# Patient Record
Sex: FEMALE | Race: WHITE | NOT HISPANIC OR LATINO | Employment: OTHER | URBAN - METROPOLITAN AREA
[De-identification: names, ages, dates, MRNs, and addresses within clinical notes are randomized per-mention and may not be internally consistent; named-entity substitution may affect disease eponyms.]

---

## 2019-01-03 ENCOUNTER — APPOINTMENT (EMERGENCY)
Dept: RADIOLOGY | Facility: HOSPITAL | Age: 54
End: 2019-01-03
Payer: COMMERCIAL

## 2019-01-03 ENCOUNTER — HOSPITAL ENCOUNTER (EMERGENCY)
Facility: HOSPITAL | Age: 54
Discharge: HOME | End: 2019-01-03
Attending: EMERGENCY MEDICINE | Admitting: EMERGENCY MEDICINE
Payer: COMMERCIAL

## 2019-01-03 VITALS
SYSTOLIC BLOOD PRESSURE: 129 MMHG | DIASTOLIC BLOOD PRESSURE: 78 MMHG | RESPIRATION RATE: 18 BRPM | HEART RATE: 98 BPM | OXYGEN SATURATION: 98 % | TEMPERATURE: 97.9 F

## 2019-01-03 DIAGNOSIS — S82.202A CLOSED FRACTURE OF LEFT TIBIA AND FIBULA, INITIAL ENCOUNTER: Primary | ICD-10-CM

## 2019-01-03 DIAGNOSIS — S82.402A CLOSED FRACTURE OF LEFT TIBIA AND FIBULA, INITIAL ENCOUNTER: Primary | ICD-10-CM

## 2019-01-03 PROCEDURE — 73630 X-RAY EXAM OF FOOT: CPT

## 2019-01-03 PROCEDURE — 99283 EMERGENCY DEPT VISIT LOW MDM: CPT

## 2019-01-03 PROCEDURE — 73610 X-RAY EXAM OF ANKLE: CPT

## 2019-01-03 RX ORDER — TRAMADOL HYDROCHLORIDE 50 MG/1
50 TABLET ORAL ONCE
Status: COMPLETED | OUTPATIENT
Start: 2019-01-03 | End: 2019-01-03

## 2019-01-03 RX ORDER — ACETAMINOPHEN 325 MG/1
650 TABLET ORAL ONCE
Status: COMPLETED | OUTPATIENT
Start: 2019-01-03 | End: 2019-01-03

## 2019-01-03 RX ORDER — NAPROXEN 500 MG/1
500 TABLET ORAL 2 TIMES DAILY WITH MEALS
Qty: 30 TABLET | Refills: 0 | Status: SHIPPED | OUTPATIENT
Start: 2019-01-03 | End: 2021-07-14 | Stop reason: ALTCHOICE

## 2019-01-03 RX ORDER — ACETAMINOPHEN AND CODEINE PHOSPHATE 300; 30 MG/1; MG/1
1 TABLET ORAL EVERY 4 HOURS PRN
Qty: 20 TABLET | Refills: 0 | Status: SHIPPED | OUTPATIENT
Start: 2019-01-03 | End: 2021-09-02 | Stop reason: ALTCHOICE

## 2019-01-03 RX ADMIN — ACETAMINOPHEN 650 MG: 325 TABLET, FILM COATED ORAL at 19:51

## 2019-01-03 RX ADMIN — TRAMADOL HYDROCHLORIDE 50 MG: 50 TABLET, FILM COATED ORAL at 21:46

## 2019-01-04 NOTE — DISCHARGE INSTRUCTIONS
Leg Fracture   WHAT YOU NEED TO KNOW:   A leg fracture is a break in any of the 3 long bones of your leg  The femur is the largest bone and goes from your hip to your knee  The fibula and tibia are the 2 bones in your lower leg that go from your knee to your ankle  DISCHARGE INSTRUCTIONS:   Return to the emergency department if:   · Your leg feels warm, tender, and painful  It may look swollen and red  · The pain in your injured leg gets worse even after you rest and take medicine  · Your cast gets wet or damaged  · Your leg or toes are numb  · The skin or toes of your injured leg become swollen, cold, or blue  Contact your healthcare provider or bone specialist if:   · You have a fever  · Your cast or brace is too tight  · There are new blood stains or a bad smell coming from under the cast     · You have new or worsening trouble moving your leg  · You have questions or concerns about your condition or care  Medicines:   · Prescription pain medicine  may be given  Ask how to take this medicine safely  · NSAIDs , such as ibuprofen, help decrease swelling, pain, and fever  NSAIDs can cause stomach bleeding or kidney problems in certain people  If you take blood thinner medicine, always ask your healthcare provider if NSAIDs are safe for you  Always read the medicine label and follow directions  · Acetaminophen  decreases pain and fever  It is available without a doctor's order  Ask how much to take and how often to take it  Follow directions  Read the labels of all other medicines you are using to see if they also contain acetaminophen, or ask your doctor or pharmacist  Acetaminophen can cause liver damage if not taken correctly  Do not use more than 4 grams (4,000 milligrams) total of acetaminophen in one day  · Take your medicine as directed  Contact your healthcare provider if you think your medicine is not helping or if you have side effects   Tell him of her if you are allergic to any medicine  Keep a list of the medicines, vitamins, and herbs you take  Include the amounts, and when and why you take them  Bring the list or the pill bottles to follow-up visits  Carry your medicine list with you in case of an emergency  Cast or splint care:   · Check the skin around your cast and splint daily for any redness or open areas  · Do not use a sharp or pointed object to scratch your skin under the cast or splint  · Do not remove your splint unless your healthcare provider says it is okay  Bathing with a cast or splint:  Do not let your cast or splint get wet  Before bathing, cover the cast or splint with a plastic bag  Tape the bag to your skin above the cast or splint to seal out the water  Keep your leg out of the water in case the bag leaks  Ask when it is okay to take a bath or shower  Self-care:   · Rest  your leg as directed and avoid activities that cause leg pain  · Apply ice  on your leg for 15 to 20 minutes every hour or as directed  Use an ice pack, or put crushed ice in a plastic bag  Cover it with a towel  Ice helps prevent tissue damage and decreases swelling and pain  · Elevate  your leg above the level of your heart as often as you can  This will help decrease swelling and pain  Prop your leg on pillows or blankets to keep it elevated comfortably  · Use crutches or a walker  as directed  Crutches will help you walk and take some weight off your injured leg while it heals  · Physical therapy  may be recommended  A physical therapist teaches you exercises to help improve movement and strength, and to decrease pain  Follow up with your healthcare provider or bone specialist as directed: You may need to return to have your splint or cast removed  You may need an x-ray of your leg to check how well the bone has healed  Write down your questions so you remember to ask them during your visits    © 2017 [employer] Information is for End User's use only and may not be sold, redistributed or otherwise used for commercial purposes  All illustrations and images included in CareNotes® are the copyrighted property of A D A M , Inc  or Eulalio Adkins  The above information is an  only  It is not intended as medical advice for individual conditions or treatments  Talk to your doctor, nurse or pharmacist before following any medical regimen to see if it is safe and effective for you
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
## 2019-01-10 ENCOUNTER — APPOINTMENT (OUTPATIENT)
Dept: RADIOLOGY | Facility: CLINIC | Age: 54
End: 2019-01-10
Payer: COMMERCIAL

## 2019-01-10 ENCOUNTER — OFFICE VISIT (OUTPATIENT)
Dept: OBGYN CLINIC | Facility: MEDICAL CENTER | Age: 54
End: 2019-01-10
Payer: COMMERCIAL

## 2019-01-10 ENCOUNTER — HOSPITAL ENCOUNTER (OUTPATIENT)
Dept: RADIOLOGY | Facility: CLINIC | Age: 54
Discharge: HOME | End: 2019-01-10
Payer: COMMERCIAL

## 2019-01-10 VITALS — HEIGHT: 63 IN | WEIGHT: 143 LBS | BODY MASS INDEX: 25.34 KG/M2

## 2019-01-10 DIAGNOSIS — S82.842A CLOSED BIMALLEOLAR FRACTURE OF LEFT ANKLE, INITIAL ENCOUNTER: Primary | ICD-10-CM

## 2019-01-10 DIAGNOSIS — S82.842A CLOSED BIMALLEOLAR FRACTURE OF LEFT ANKLE, INITIAL ENCOUNTER: ICD-10-CM

## 2019-01-10 DIAGNOSIS — S93.601A SPRAIN OF RIGHT FOOT, INITIAL ENCOUNTER: ICD-10-CM

## 2019-01-10 PROCEDURE — 73630 X-RAY EXAM OF FOOT: CPT

## 2019-01-10 PROCEDURE — 73610 X-RAY EXAM OF ANKLE: CPT

## 2019-01-10 PROCEDURE — 99204 OFFICE O/P NEW MOD 45 MIN: CPT | Performed by: EMERGENCY MEDICINE

## 2019-01-10 RX ORDER — QUETIAPINE FUMARATE 300 MG/1
TABLET, FILM COATED ORAL
COMMUNITY
Start: 2018-11-20

## 2019-01-10 RX ORDER — LORAZEPAM 0.5 MG/1
TABLET ORAL
Refills: 0 | COMMUNITY
Start: 2019-01-04

## 2019-01-10 RX ORDER — LISINOPRIL 2.5 MG/1
TABLET ORAL
Refills: 0 | COMMUNITY
Start: 2018-10-11 | End: 2021-07-14 | Stop reason: ALTCHOICE

## 2019-01-10 RX ORDER — LEVOTHYROXINE SODIUM 175 UG/1
TABLET ORAL
Refills: 0 | COMMUNITY
Start: 2019-01-04 | End: 2021-07-14 | Stop reason: SDUPTHER
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
## 2019-01-13 ENCOUNTER — HOSPITAL ENCOUNTER (OUTPATIENT)
Dept: CT IMAGING | Facility: HOSPITAL | Age: 54
Discharge: HOME | End: 2019-01-13
Attending: EMERGENCY MEDICINE
Payer: COMMERCIAL

## 2019-01-13 DIAGNOSIS — S93.601A SPRAIN OF RIGHT FOOT, INITIAL ENCOUNTER: ICD-10-CM

## 2019-01-13 PROCEDURE — 73700 CT LOWER EXTREMITY W/O DYE: CPT

## 2019-01-15 DIAGNOSIS — S92.324A CLOSED NONDISPLACED FRACTURE OF SECOND METATARSAL BONE OF RIGHT FOOT, INITIAL ENCOUNTER: Primary | ICD-10-CM

## 2019-01-15 DIAGNOSIS — S93.601A SPRAIN OF RIGHT FOOT, INITIAL ENCOUNTER: ICD-10-CM

## 2019-01-16 ENCOUNTER — TRANSCRIBE ORDERS (OUTPATIENT)
Dept: ADMINISTRATIVE | Facility: HOSPITAL | Age: 54
End: 2019-01-16

## 2019-01-16 ENCOUNTER — HOSPITAL ENCOUNTER (OUTPATIENT)
Dept: MRI IMAGING | Facility: HOSPITAL | Age: 54
Discharge: HOME | End: 2019-01-16
Payer: COMMERCIAL

## 2019-01-16 DIAGNOSIS — S93.601A SPRAIN OF RIGHT FOOT, INITIAL ENCOUNTER: ICD-10-CM

## 2019-01-16 DIAGNOSIS — S92.324A CLOSED NONDISPLACED FRACTURE OF SECOND METATARSAL BONE OF RIGHT FOOT, INITIAL ENCOUNTER: ICD-10-CM

## 2019-01-16 PROCEDURE — 73718 MRI LOWER EXTREMITY W/O DYE: CPT

## 2019-01-17 ENCOUNTER — APPOINTMENT (OUTPATIENT)
Dept: RADIOLOGY | Facility: CLINIC | Age: 54
End: 2019-01-17
Payer: COMMERCIAL

## 2019-01-17 ENCOUNTER — OFFICE VISIT (OUTPATIENT)
Dept: OBGYN CLINIC | Facility: CLINIC | Age: 54
End: 2019-01-17
Payer: COMMERCIAL

## 2019-01-17 VITALS
BODY MASS INDEX: 25.33 KG/M2 | HEIGHT: 63 IN | HEART RATE: 91 BPM | DIASTOLIC BLOOD PRESSURE: 75 MMHG | SYSTOLIC BLOOD PRESSURE: 124 MMHG

## 2019-01-17 DIAGNOSIS — S82.842A CLOSED BIMALLEOLAR FRACTURE OF LEFT ANKLE, INITIAL ENCOUNTER: ICD-10-CM

## 2019-01-17 DIAGNOSIS — S92.909A MULTIPLE CLOSED FRACTURES OF FOOT, INITIAL ENCOUNTER: ICD-10-CM

## 2019-01-17 DIAGNOSIS — S82.842A CLOSED BIMALLEOLAR FRACTURE OF LEFT ANKLE, INITIAL ENCOUNTER: Primary | ICD-10-CM

## 2019-01-17 PROCEDURE — 99213 OFFICE O/P EST LOW 20 MIN: CPT | Performed by: ORTHOPAEDIC SURGERY

## 2019-01-17 PROCEDURE — 73610 X-RAY EXAM OF ANKLE: CPT
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
## 2019-01-25 ENCOUNTER — OFFICE VISIT (OUTPATIENT)
Dept: OBGYN CLINIC | Facility: MEDICAL CENTER | Age: 54
End: 2019-01-25
Payer: COMMERCIAL

## 2019-01-25 VITALS
WEIGHT: 143 LBS | HEIGHT: 63 IN | HEART RATE: 97 BPM | BODY MASS INDEX: 25.34 KG/M2 | DIASTOLIC BLOOD PRESSURE: 56 MMHG | SYSTOLIC BLOOD PRESSURE: 85 MMHG

## 2019-01-25 DIAGNOSIS — S92.902D CLOSED MULTIPLE FRACTURES OF LEFT FOOT WITH ROUTINE HEALING, SUBSEQUENT ENCOUNTER: ICD-10-CM

## 2019-01-25 DIAGNOSIS — S82.842D CLOSED BIMALLEOLAR FRACTURE OF LEFT ANKLE WITH ROUTINE HEALING, SUBSEQUENT ENCOUNTER: Primary | ICD-10-CM

## 2019-01-25 PROCEDURE — 99213 OFFICE O/P EST LOW 20 MIN: CPT | Performed by: EMERGENCY MEDICINE

## 2019-01-25 RX ORDER — PNEUMOCOCCAL VACCINE POLYVALENT 25; 25; 25; 25; 25; 25; 25; 25; 25; 25; 25; 25; 25; 25; 25; 25; 25; 25; 25; 25; 25; 25; 25 UG/.5ML; UG/.5ML; UG/.5ML; UG/.5ML; UG/.5ML; UG/.5ML; UG/.5ML; UG/.5ML; UG/.5ML; UG/.5ML; UG/.5ML; UG/.5ML; UG/.5ML; UG/.5ML; UG/.5ML; UG/.5ML; UG/.5ML; UG/.5ML; UG/.5ML; UG/.5ML; UG/.5ML; UG/.5ML; UG/.5ML
INJECTION, SOLUTION INTRAMUSCULAR; SUBCUTANEOUS
Refills: 0 | COMMUNITY
Start: 2018-11-23 | End: 2021-07-14 | Stop reason: ALTCHOICE

## 2019-01-25 RX ORDER — GABAPENTIN 300 MG/1
CAPSULE ORAL
COMMUNITY
Start: 2018-11-20 | End: 2021-07-19 | Stop reason: ALTCHOICE

## 2019-01-25 RX ORDER — GABAPENTIN 600 MG/1
TABLET ORAL 3 TIMES DAILY
COMMUNITY
End: 2021-07-19 | Stop reason: ALTCHOICE

## 2019-01-25 RX ORDER — INFLUENZA A VIRUS A/SINGAPORE/GP1908/2015 IVR-180A (H1N1) ANTIGEN (PROPIOLACTONE INACTIVATED), INFLUENZA A VIRUS A/HONG KONG/4801/2014 X-263B (H3N2) ANTIGEN (PROPIOLACTONE INACTIVATED), AND INFLUENZA B VIRUS B/BRISBANE/46/2015 ANTIGEN (PROPIOLACTONE INACTIVATED) 15; 15; 15 UG/.5ML; UG/.5ML; UG/.5ML
INJECTION, SUSPENSION INTRAMUSCULAR
Refills: 0 | COMMUNITY
Start: 2018-11-23 | End: 2021-09-02 | Stop reason: ALTCHOICE

## 2019-01-25 RX ORDER — ASCORBIC ACID 125 MG
TABLET,CHEWABLE ORAL EVERY 24 HOURS
COMMUNITY
Start: 2016-11-03

## 2019-01-25 RX ORDER — FLUOXETINE HYDROCHLORIDE 40 MG/1
CAPSULE ORAL
COMMUNITY
Start: 2018-11-20
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
## 2019-02-14 ENCOUNTER — TELEPHONE (OUTPATIENT)
Dept: OBGYN CLINIC | Facility: HOSPITAL | Age: 54
End: 2019-02-14

## 2019-02-15 ENCOUNTER — APPOINTMENT (OUTPATIENT)
Dept: RADIOLOGY | Facility: CLINIC | Age: 54
End: 2019-02-15
Payer: COMMERCIAL

## 2019-02-15 ENCOUNTER — OFFICE VISIT (OUTPATIENT)
Dept: OBGYN CLINIC | Facility: MEDICAL CENTER | Age: 54
End: 2019-02-15
Payer: COMMERCIAL

## 2019-02-15 VITALS
DIASTOLIC BLOOD PRESSURE: 70 MMHG | SYSTOLIC BLOOD PRESSURE: 114 MMHG | WEIGHT: 143 LBS | HEIGHT: 63 IN | HEART RATE: 70 BPM | BODY MASS INDEX: 25.34 KG/M2

## 2019-02-15 DIAGNOSIS — S92.324A CLOSED NONDISPLACED FRACTURE OF SECOND METATARSAL BONE OF RIGHT FOOT, INITIAL ENCOUNTER: ICD-10-CM

## 2019-02-15 DIAGNOSIS — S82.842D CLOSED BIMALLEOLAR FRACTURE OF LEFT ANKLE WITH ROUTINE HEALING, SUBSEQUENT ENCOUNTER: Primary | ICD-10-CM

## 2019-02-15 DIAGNOSIS — S82.842D CLOSED BIMALLEOLAR FRACTURE OF LEFT ANKLE WITH ROUTINE HEALING, SUBSEQUENT ENCOUNTER: ICD-10-CM

## 2019-02-15 PROCEDURE — 73630 X-RAY EXAM OF FOOT: CPT

## 2019-02-15 PROCEDURE — 99213 OFFICE O/P EST LOW 20 MIN: CPT | Performed by: EMERGENCY MEDICINE

## 2019-02-15 PROCEDURE — 73610 X-RAY EXAM OF ANKLE: CPT
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
## 2019-02-15 NOTE — PATIENT INSTRUCTIONS
Follow up in 4 weeks  After 2 weeks (8 weeks out from injury) you may wean from left walking boot      You may perform range of motion exercises at home

## 2019-02-18 ENCOUNTER — HOSPITAL ENCOUNTER (EMERGENCY)
Facility: HOSPITAL | Age: 54
Discharge: HOME | End: 2019-02-18
Attending: EMERGENCY MEDICINE
Payer: COMMERCIAL

## 2019-02-18 VITALS
WEIGHT: 152.8 LBS | DIASTOLIC BLOOD PRESSURE: 63 MMHG | HEART RATE: 98 BPM | RESPIRATION RATE: 16 BRPM | SYSTOLIC BLOOD PRESSURE: 109 MMHG | OXYGEN SATURATION: 100 % | TEMPERATURE: 96.5 F

## 2019-02-18 DIAGNOSIS — T16.2XXA EAR FOREIGN BODY, LEFT, INITIAL ENCOUNTER: Primary | ICD-10-CM

## 2019-02-18 PROCEDURE — 99282 EMERGENCY DEPT VISIT SF MDM: CPT

## 2019-02-18 RX ORDER — NEOMYCIN SULFATE, POLYMYXIN B SULFATE, HYDROCORTISONE 3.5; 10000; 1 MG/ML; [USP'U]/ML; MG/ML
4 SOLUTION/ DROPS AURICULAR (OTIC) EVERY 6 HOURS SCHEDULED
Qty: 5.6 ML | Refills: 0 | Status: SHIPPED | OUTPATIENT
Start: 2019-02-18 | End: 2021-07-14 | Stop reason: ALTCHOICE

## 2019-02-18 NOTE — ED PROVIDER NOTES
History  Chief Complaint   Patient presents with   Bishop Camacho     c/o of pain to left ear - x a couple of days - hurts after using  ear plugs       Earache   Location:  Left  Behind ear:  No abnormality  Quality:  Aching  Severity:  Moderate  Onset quality:  Gradual  Timing:  Constant  Progression:  Worsening  Chronicity:  New  Context: foreign body    Relieved by:  Nothing  Worsened by:  Nothing  Ineffective treatments:  None tried  Associated symptoms: no abdominal pain, no cough, no diarrhea, no fever, no headaches, no rash, no rhinorrhea and no sore throat        None       Past Medical History:   Diagnosis Date    Ankle fracture     Depression     Disease of thyroid gland     Foot fracture, left     Psychiatric disorder     Renal disorder        Past Surgical History:   Procedure Laterality Date    ALIS-EN-Y PROCEDURE         No family history on file  I have reviewed and agree with the history as documented  Social History     Tobacco Use    Smoking status: Never Smoker    Smokeless tobacco: Never Used   Substance Use Topics    Alcohol use: Never     Frequency: Never    Drug use: Never        Review of Systems   Constitutional: Negative for chills and fever  HENT: Positive for ear pain  Negative for rhinorrhea, sore throat and trouble swallowing  Eyes: Negative for pain  Respiratory: Negative for cough, shortness of breath, wheezing and stridor  Cardiovascular: Negative for chest pain and leg swelling  Gastrointestinal: Negative for abdominal pain, diarrhea and nausea  Endocrine: Negative for polyuria  Genitourinary: Negative for dysuria, flank pain and urgency  Musculoskeletal: Negative for joint swelling, myalgias and neck stiffness  Skin: Negative for rash  Allergic/Immunologic: Negative for immunocompromised state  Neurological: Negative for dizziness, syncope, weakness, numbness and headaches  Psychiatric/Behavioral: Negative for confusion and suicidal ideas     All other systems reviewed and are negative  Physical Exam  Physical Exam   Constitutional: She is oriented to person, place, and time  She appears well-developed and well-nourished  HENT:   Head: Normocephalic and atraumatic  Right Ear: Tympanic membrane, external ear and ear canal normal    Left Ear: A foreign body is present  Eyes: Pupils are equal, round, and reactive to light  EOM are normal    Neck: Normal range of motion  Neck supple  Cardiovascular: Normal rate and regular rhythm  Exam reveals no friction rub  No murmur heard  Pulmonary/Chest: Breath sounds normal  No respiratory distress  She has no wheezes  She has no rales  Abdominal: Soft  Bowel sounds are normal  She exhibits no distension  There is no tenderness  Musculoskeletal: Normal range of motion  She exhibits no edema or tenderness  Neurological: She is alert and oriented to person, place, and time  Skin: Skin is warm  No rash noted  Psychiatric: She has a normal mood and affect  Nursing note and vitals reviewed        Vital Signs  ED Triage Vitals   Temperature Pulse Respirations Blood Pressure SpO2   02/18/19 1551 02/18/19 1551 02/18/19 1551 02/18/19 1555 02/18/19 1551   (!) 96 5 °F (35 8 °C) 98 16 109/63 100 %      Temp Source Heart Rate Source Patient Position - Orthostatic VS BP Location FiO2 (%)   02/18/19 1551 02/18/19 1551 02/18/19 1555 02/18/19 1555 --   Tympanic Monitor Sitting Right arm       Pain Score       --                  Vitals:    02/18/19 1551 02/18/19 1555   BP:  109/63   Pulse: 98    Patient Position - Orthostatic VS:  Sitting       Visual Acuity      ED Medications  Medications - No data to display    Diagnostic Studies  Results Reviewed     None                 No orders to display              Procedures  Foreign Body - Orifice  Date/Time: 2/18/2019 4:48 PM  Performed by: Mita Jasso DO  Authorized by: Mita Jasso DO     Patient location:  ED  Consent:     Consent obtained:  Verbal Consent given by:  Patient    Risks discussed:  Bleeding, damage to surrounding structures, incomplete removal, infection, need for surgical removal, pain, TM perforation and worsening of condition    Alternatives discussed:  No treatment  Universal protocol:     Procedure explained and questions answered to patient or proxy's satisfaction: yes      Relevant documents present and verified: yes      Test results available and properly labeled: yes      Radiology Images displayed and confirmed  If images not available, report reviewed : yes      Required blood products, implants, devices, and special equipment available: yes      Site/side marked: yes      Immediately prior to procedure a time out was called: yes      Patient identity confirmed:  Verbally with patient and arm band  Location:     Location:  Ear    Ear location:  L ear  Pre-procedure details:     Imaging:  None  Anesthesia (see MAR for exact dosages): Topical anesthetic:  None  Procedure details:     Localization method:  Direct visualization    Removal mechanism: Forceps    Procedure complexity:  Simple    Foreign bodies recovered:  1    Description:  Ear bud from headphones    Intact foreign body removal: yes    Post-procedure details:     Confirmation:  No additional foreign bodies on visualization    Patient tolerance of procedure: Tolerated well, no immediate complications           Phone Contacts  ED Phone Contact    ED Course                               MDM    Disposition  Final diagnoses:   Ear foreign body, left, initial encounter     Time reflects when diagnosis was documented in both MDM as applicable and the Disposition within this note     Time User Action Codes Description Comment    2/18/2019  4:44 PM Gianfranco Hearing Add [T16  2XXA] Ear foreign body, left, initial encounter       ED Disposition     ED Disposition Condition Date/Time Comment    Discharge Stable Mon Feb 18, 2019  4:44 PM Desi Willoughby discharge to home/self care             Follow-up Information     Follow up With Specialties Details Why Contact Info Additional [address]   [address], [address] [facility] [id number]  [address], [address], [id number]          Patient's Medications   Discharge Prescriptions    NEOMYCIN-POLYMYXIN-HYDROCORTISONE (CORTISPORIN) 1 % SOLN    Administer 4 drops into the left ear every 6 (six) hours for 7 days       Start Date: 2/18/2019 End Date: 2/25/2019       Order Dose: 4 drops       Quantity: 5 6 mL    Refills: 0     No discharge procedures on file      ED Provider  Electronically Signed by           Elias Sorto DO  02/18/19 8559

## 2021-07-14 ENCOUNTER — OFFICE VISIT (OUTPATIENT)
Dept: FAMILY MEDICINE CLINIC | Facility: CLINIC | Age: 56
End: 2021-07-14

## 2021-07-14 VITALS
BODY MASS INDEX: 29.07 KG/M2 | DIASTOLIC BLOOD PRESSURE: 70 MMHG | HEIGHT: 61 IN | TEMPERATURE: 97.5 F | HEART RATE: 83 BPM | SYSTOLIC BLOOD PRESSURE: 108 MMHG | RESPIRATION RATE: 18 BRPM | OXYGEN SATURATION: 98 % | WEIGHT: 154 LBS

## 2021-07-14 DIAGNOSIS — E66.3 OVERWEIGHT: ICD-10-CM

## 2021-07-14 DIAGNOSIS — Z98.84 HISTORY OF ROUX-EN-Y GASTRIC BYPASS: ICD-10-CM

## 2021-07-14 DIAGNOSIS — F31.9 BIPOLAR AFFECTIVE DISORDER, REMISSION STATUS UNSPECIFIED (HCC): ICD-10-CM

## 2021-07-14 DIAGNOSIS — F32.A DEPRESSION, UNSPECIFIED DEPRESSION TYPE: ICD-10-CM

## 2021-07-14 DIAGNOSIS — J30.9 ALLERGIC RHINITIS, UNSPECIFIED SEASONALITY, UNSPECIFIED TRIGGER: Primary | ICD-10-CM

## 2021-07-14 DIAGNOSIS — Z13.31 DEPRESSION SCREEN: ICD-10-CM

## 2021-07-14 DIAGNOSIS — Z12.11 COLON CANCER SCREENING: ICD-10-CM

## 2021-07-14 DIAGNOSIS — Z91.89 NEED FOR DENTAL CARE: ICD-10-CM

## 2021-07-14 DIAGNOSIS — Z00.00 HEALTHCARE MAINTENANCE: ICD-10-CM

## 2021-07-14 DIAGNOSIS — E03.9 HYPOTHYROIDISM, UNSPECIFIED TYPE: ICD-10-CM

## 2021-07-14 DIAGNOSIS — Z12.31 BREAST CANCER SCREENING BY MAMMOGRAM: ICD-10-CM

## 2021-07-14 PROCEDURE — 99204 OFFICE O/P NEW MOD 45 MIN: CPT | Performed by: PHYSICIAN ASSISTANT

## 2021-07-14 PROCEDURE — 1036F TOBACCO NON-USER: CPT | Performed by: PHYSICIAN ASSISTANT

## 2021-07-14 RX ORDER — CETIRIZINE HYDROCHLORIDE 10 MG/1
10 TABLET ORAL DAILY
Qty: 90 TABLET | Refills: 1 | Status: SHIPPED | OUTPATIENT
Start: 2021-07-14 | End: 2021-11-08 | Stop reason: SDUPTHER

## 2021-07-14 RX ORDER — LEVOTHYROXINE SODIUM 175 UG/1
175 TABLET ORAL DAILY
Qty: 90 TABLET | Refills: 1 | Status: SHIPPED | OUTPATIENT
Start: 2021-07-14 | End: 2021-11-08 | Stop reason: SDUPTHER

## 2021-07-14 RX ORDER — FERROUS SULFATE TAB EC 324 MG (65 MG FE EQUIVALENT) 324 (65 FE) MG
324 TABLET DELAYED RESPONSE ORAL
Qty: 90 TABLET | Refills: 2 | Status: SHIPPED | OUTPATIENT
Start: 2021-07-14 | End: 2021-09-02 | Stop reason: SDUPTHER

## 2021-07-14 NOTE — PROGRESS NOTES
Assessment/Plan:     Hypothyroidism  - Will restart levothyroxine 175 mcg once daily  Will check updated TSH level  Advised patient will need another TSH level in about 6 weeks after she has restarted her medication  History of Arnav-en-Y gastric bypass  - Performed in 2015 by Dr Meredith Seymour @ [facility]   - Will check updated vitamin levels, CBC, CMP, iron levels  Bipolar disorder ([facility] )  - Continue follow-up with Psychiatry, [facility] for therapy and medication management  Continue Prozac and Ativan as prescribed through Psychiatry  Allergic rhinitis  - Will prescribe Zyrtec 10 mg daily  Return in about 6 weeks (around 8/25/2021) for Next scheduled follow up vitamins, thyroid  Diagnoses and all orders for this visit:    Allergic rhinitis, unspecified seasonality, unspecified trigger  -     cetirizine (ZyrTEC) 10 mg tablet; Take 1 tablet (10 mg total) by mouth daily    Hypothyroidism, unspecified type  -     levothyroxine 175 mcg tablet; Take 1 tablet (175 mcg total) by mouth daily  -     TSH, 3rd generation with Free T4 reflex; Future    Breast cancer screening by mammogram  -     Mammo screening bilateral w 3d & cad; Future    Healthcare maintenance  -     CBC and differential; Future  -     Comprehensive metabolic panel; Future  -     Lipid panel; Future  -     Hemoglobin A1C; Future    History of Arnav-en-Y gastric bypass  -     Vitamin D 25 hydroxy; Future  -     Vitamin B12; Future  -     Ferritin; Future  -     Iron Saturation %; Future  -     Vitamin B1, whole blood; Future  -     Folate; Future  -     ferrous sulfate 324 (65 Fe) mg; Take 1 tablet (324 mg total) by mouth 3 (three) times a day before meals    Colon cancer screening  -     Cologuard; Future    Overweight    Need for dental care  -     Ambulatory referral to Dentistry;  Future    Bipolar affective disorder, remission status unspecified ([facility] )    Depression, unspecified depression type    Depression screen        All of patients questions were answered  Patient understands and agrees with the above plan  Florecita Ryan PA-C  07/14/21  [facility]       Subjective:     Evy Escobar is a 55 y o  female who  has a past medical history of Ankle fracture, Bipolar 1 disorder ([address] ), Depression, Depression, Disease of thyroid gland, Foot fracture, left, Pneumonia, Psychiatric disorder, Renal disorder, and Tachycardia  She also has no past medical history of ADHD (attention deficit hyperactivity disorder), Asthma, Bleeding disorder ([address] ), Cancer ([address] ), Chronic kidney disease, Diabetes insipidus ([facility] ), Disease of thyroid gland, Head injury, Heart disease, Hypertension, Liver disease, Neurological disorder, Osteoarthritis, Rheumatic disease, Rheumatoid arthritis ([facility] ), Seizures ([facility] ), Skin disorder, Stomach disorder, Stroke [facility]), or Vascular disorder  who presented to the office today to establish care  - Patient is a 55 y o  female who presents today to establish care  Patient notes she has hypothyroidism and was previously taking levothyroxine 175 mcg daily  Patient notes that has been about 5-6 months without her medications  Patient notes over the past few months she has been feeling very fatigued  Patient has history of gastric bypass Arnav-en-Y surgery in 2015 by Dr Jose Mcbride at [facility]  Patient notes she lost over 100 lb and her usual weight is about 125 lb  However, patient notes over the past few months she gained about 25 lb  Patient notes she has been taking daily multivitamin, B complex, iron supplement 5 times a day  Patient notes she also tries to eat a lot of green leafy things to increase her iron, however it always seems to be low  Patient notes she donates plasma 2 times a week  Patient notes she was previously a diabetic prior to losing weight    Patient notes for the first 5 years after her surgery, she would have regular blood work completed in everything was always normal     - Patient follows with Psychiatry, [facility] for therapy and medication management  Patient notes currently she is taking Prozac and Ativan  Patient notes she believes she has some circulation issues  Patient notes both arms often fall asleep at night where she feels pins and needle sensation  Patient notes sometimes this happens with her legs as well  Patient notes her hands are always " ice cold"  Patient notes recently she has been experiencing a runny nose  Dental Regular Visits:   Note, patient would like to establish care with a dentist     Vision Problems: Wears reading glasses       Life Style  Tobacco Use: No   Alcohol Use: Not currently; last drank alcohol about 1-2 years ago; previous history of alcohol abuse   Drug Use: No    Breast Cancer Screening:  - Risks and benefits discussed  Patient does not remember when her last mammogram was  Colorectal Cancer Screening:   - Risks and benefits discussed  Patient denies ever having a colonoscopy completed  Cervical Cancer Screening:  - Risks and benefits discussed  Patient does not remember when her last Pap was      PHQ-9 Depression Screening    PHQ-9:   Frequency of the following problems over the past two weeks:      Little interest or pleasure in doing things: 0 - not at all  Feeling down, depressed, or hopeless: 0 - not at all  PHQ-2 Score: 0           Current Outpatient Medications on File Prior to Visit   Medication Sig Dispense Refill    acetaminophen-codeine (TYLENOL #3) 300-30 mg per tablet Take 1 tablet by mouth every 4 (four) hours as needed for moderate pain 20 tablet 0    AFLURIA PRESERVATIVE FREE 0 5 ML PETTY inject 0 5 milliliter intramuscularly  0    Calcium Citrate-Vitamin D (CALCET CREAMY BITES) 500-400 MG-UNIT CHEW 2 to 3 daily      Cyanocobalamin (B-12) 5000 MCG CAPS every 24 hours      Ergocalciferol (VITAMIN D2 PO)       FLUoxetine (PROzac) 40 MG capsule 2 po am      LORazepam (ATIVAN) 0 5 mg tablet   0    Multiple Vitamins-Minerals (MULTIVITAMIN ADULT PO) 2 po daily      QUEtiapine (SEROquel) 300 mg tablet Two at bedtime      [DISCONTINUED] gabapentin (NEURONTIN) 300 mg capsule One at bed time      [DISCONTINUED] gabapentin (NEURONTIN) 600 MG tablet 3 (three) times a day       No current facility-administered medications on file prior to visit  Past Medical History:   Diagnosis Date    Ankle fracture     Bipolar 1 disorder ([facility] )     Depression     Depression 7/19/2021    Disease of thyroid gland     Foot fracture, left     Pneumonia 12/2008    Psychiatric disorder     Renal disorder     Tachycardia 12/2008     Past Surgical History:   Procedure Laterality Date    GASTRIC BYPASS  01/25/2016    ALIS-EN-Y PROCEDURE       Social History     Socioeconomic History    Marital status: Single     Spouse name: None    Number of children: None    Years of education: None    Highest education level: None   Occupational History    None   Tobacco Use    Smoking status: Never Smoker    Smokeless tobacco: Never Used   Substance and Sexual Activity    Alcohol use: Never    Drug use: Never    Sexual activity: None   Other Topics Concern    None   Social History Narrative    ** Merged History Encounter **          Social Determinants of Health     Financial Resource Strain:     Difficulty of Paying Living Expenses:    Food Insecurity:     Worried About Running Out of Food in the Last Year:     Ran Out of Food in the Last Year:    Transportation Needs:     Lack of Transportation (Medical):      Lack of Transportation (Non-Medical):    Physical Activity:     Days of Exercise per Week:     Minutes of Exercise per Session:    Stress:     Feeling of Stress :    Social Connections:     Frequency of Communication with Friends and Family:     Frequency of Social Gatherings with Friends and Family:     Attends Denominational Services:     Active Member of Clubs or Organizations:     Attends Club or Organization Meetings:     Marital Status:    Intimate Partner Violence:     Fear of Current or Ex-Partner:     Emotionally Abused:     Physically Abused:     Sexually Abused:      History reviewed  No pertinent family history  Review of Systems   Constitutional: Positive for fatigue  Negative for chills and fever  HENT: Positive for congestion and rhinorrhea  Negative for sore throat  Eyes: Negative for pain and visual disturbance  Respiratory: Negative for cough, chest tightness and shortness of breath  Cardiovascular: Negative for chest pain and palpitations  Gastrointestinal: Negative for abdominal pain, constipation, diarrhea, nausea and vomiting  Genitourinary: Negative for difficulty urinating and dysuria  Musculoskeletal: Negative for arthralgias  Skin: Negative for rash  Allergic/Immunologic: Positive for environmental allergies  Neurological: Negative for dizziness and headaches  Psychiatric/Behavioral: Negative for behavioral problems  The patient is not nervous/anxious  BMI Counseling: Body mass index is 29 1 kg/m²  The BMI is above normal  Nutrition recommendations include encouraging healthy choices of fruits and vegetables  Exercise recommendations include moderate physical activity 150 minutes/week  No pharmacotherapy was ordered  Objective:  /70 (BP Location: Left arm, Patient Position: Sitting, Cuff Size: Standard)   Pulse 83   Temp 97 5 °F (36 4 °C) (Temporal)   Resp 18   Ht 5' 1" (1 549 m)   Wt 69 9 kg (154 lb)   SpO2 98%   BMI 29 10 kg/m²     Physical Exam  Vitals and nursing note reviewed  Constitutional:       Appearance: She is well-developed  HENT:      Head: Normocephalic and atraumatic  Right Ear: External ear normal       Left Ear: External ear normal       Nose: Nose normal       Mouth/Throat:      Pharynx: Uvula midline  Eyes:      Conjunctiva/sclera: Conjunctivae normal       Pupils: Pupils are equal, round, and reactive to light  Cardiovascular:      Rate and Rhythm: Normal rate and regular rhythm  Heart sounds: Normal heart sounds  No murmur heard  Pulmonary:      Effort: Pulmonary effort is normal       Breath sounds: Normal breath sounds  No wheezing  Abdominal:      General: Bowel sounds are normal       Palpations: Abdomen is soft  Tenderness: There is no abdominal tenderness  Musculoskeletal:         General: Normal range of motion  Cervical back: Normal range of motion and neck supple  Skin:     General: Skin is warm and dry  Neurological:      Mental Status: She is alert and oriented to person, place, and time     Psychiatric:         Speech: Speech normal          Behavior: Behavior normal

## 2021-07-19 PROBLEM — J30.9 ALLERGIC RHINITIS: Status: ACTIVE | Noted: 2021-07-19

## 2021-07-19 PROBLEM — Z98.84 HISTORY OF ROUX-EN-Y GASTRIC BYPASS: Status: ACTIVE | Noted: 2021-07-19

## 2021-07-19 PROBLEM — F31.9 BIPOLAR DISORDER (HCC): Status: ACTIVE | Noted: 2021-07-19

## 2021-07-19 PROBLEM — E03.9 HYPOTHYROIDISM: Status: ACTIVE | Noted: 2021-07-19

## 2021-07-19 PROBLEM — F32.A DEPRESSION: Status: ACTIVE | Noted: 2021-07-19

## 2021-07-20 NOTE — ASSESSMENT & PLAN NOTE
- Continue follow-up with Psychiatry, [facility] for therapy and medication management  Continue Prozac and Ativan as prescribed through Psychiatry

## 2021-07-20 NOTE — ASSESSMENT & PLAN NOTE
- Performed in 2015 by Dr Barrera Purchase @ [address] [place] [address]   - Will check updated vitamin levels, CBC, CMP, iron levels

## 2021-07-20 NOTE — ASSESSMENT & PLAN NOTE
- Will restart levothyroxine 175 mcg once daily  Will check updated TSH level  Advised patient will need another TSH level in about 6 weeks after she has restarted her medication

## 2021-08-02 ENCOUNTER — APPOINTMENT (OUTPATIENT)
Dept: LAB | Facility: HOSPITAL | Age: 56
End: 2021-08-02
Payer: COMMERCIAL

## 2021-08-02 DIAGNOSIS — Z00.00 HEALTHCARE MAINTENANCE: ICD-10-CM

## 2021-08-02 DIAGNOSIS — Z98.84 HISTORY OF ROUX-EN-Y GASTRIC BYPASS: ICD-10-CM

## 2021-08-02 DIAGNOSIS — E03.9 HYPOTHYROIDISM, UNSPECIFIED TYPE: ICD-10-CM

## 2021-08-02 LAB
ALBUMIN SERPL BCP-MCNC: 3.6 G/DL (ref 3–5.2)
ALP SERPL-CCNC: 60 U/L (ref 43–122)
ALT SERPL W P-5'-P-CCNC: 74 U/L
ANION GAP SERPL CALCULATED.3IONS-SCNC: 4 MMOL/L (ref 5–14)
AST SERPL W P-5'-P-CCNC: 71 U/L (ref 14–36)
BASOPHILS # BLD AUTO: 0 THOUSANDS/ΜL (ref 0–0.1)
BASOPHILS NFR BLD AUTO: 1 % (ref 0–1)
BILIRUB SERPL-MCNC: 0.45 MG/DL
BUN SERPL-MCNC: 17 MG/DL (ref 5–25)
CALCIUM SERPL-MCNC: 9 MG/DL (ref 8.4–10.2)
CHLORIDE SERPL-SCNC: 103 MMOL/L (ref 97–108)
CHOLEST SERPL-MCNC: 262 MG/DL
CO2 SERPL-SCNC: 32 MMOL/L (ref 22–30)
CREAT SERPL-MCNC: 1.05 MG/DL (ref 0.6–1.2)
EOSINOPHIL # BLD AUTO: 0.1 THOUSAND/ΜL (ref 0–0.4)
EOSINOPHIL NFR BLD AUTO: 4 % (ref 0–6)
ERYTHROCYTE [DISTWIDTH] IN BLOOD BY AUTOMATED COUNT: 14.3 %
EST. AVERAGE GLUCOSE BLD GHB EST-MCNC: 111 MG/DL
GFR SERPL CREATININE-BSD FRML MDRD: 60 ML/MIN/1.73SQ M
GLUCOSE P FAST SERPL-MCNC: 86 MG/DL (ref 70–99)
HBA1C MFR BLD: 5.5 %
HCT VFR BLD AUTO: 38.2 % (ref 36–46)
HDLC SERPL-MCNC: 119 MG/DL
HGB BLD-MCNC: 12.6 G/DL (ref 12–16)
LDLC SERPL CALC-MCNC: 123 MG/DL
LYMPHOCYTES # BLD AUTO: 1.5 THOUSANDS/ΜL (ref 0.5–4)
LYMPHOCYTES NFR BLD AUTO: 37 % (ref 25–45)
MCH RBC QN AUTO: 31.9 PG (ref 26–34)
MCHC RBC AUTO-ENTMCNC: 32.9 G/DL (ref 31–36)
MCV RBC AUTO: 97 FL (ref 80–100)
MONOCYTES # BLD AUTO: 0.4 THOUSAND/ΜL (ref 0.2–0.9)
MONOCYTES NFR BLD AUTO: 9 % (ref 1–10)
NEUTROPHILS # BLD AUTO: 2 THOUSANDS/ΜL (ref 1.8–7.8)
NEUTS SEG NFR BLD AUTO: 49 % (ref 45–65)
NONHDLC SERPL-MCNC: 143 MG/DL
PLATELET # BLD AUTO: 244 THOUSANDS/UL (ref 150–450)
PMV BLD AUTO: 8.5 FL (ref 8.9–12.7)
POTASSIUM SERPL-SCNC: 4 MMOL/L (ref 3.6–5)
PROT SERPL-MCNC: 6.1 G/DL (ref 5.9–8.4)
RBC # BLD AUTO: 3.94 MILLION/UL (ref 4–5.2)
SODIUM SERPL-SCNC: 139 MMOL/L (ref 137–147)
TRIGL SERPL-MCNC: 101 MG/DL
TSH SERPL DL<=0.05 MIU/L-ACNC: 134 UIU/ML (ref 0.47–4.68)
WBC # BLD AUTO: 4.1 THOUSAND/UL (ref 4.5–11)

## 2021-08-02 PROCEDURE — 83550 IRON BINDING TEST: CPT

## 2021-08-02 PROCEDURE — 82746 ASSAY OF FOLIC ACID SERUM: CPT

## 2021-08-02 PROCEDURE — 82607 VITAMIN B-12: CPT

## 2021-08-02 PROCEDURE — 84443 ASSAY THYROID STIM HORMONE: CPT

## 2021-08-02 PROCEDURE — 80061 LIPID PANEL: CPT

## 2021-08-02 PROCEDURE — 84439 ASSAY OF FREE THYROXINE: CPT

## 2021-08-02 PROCEDURE — 83540 ASSAY OF IRON: CPT

## 2021-08-02 PROCEDURE — 85025 COMPLETE CBC W/AUTO DIFF WBC: CPT

## 2021-08-02 PROCEDURE — 82306 VITAMIN D 25 HYDROXY: CPT

## 2021-08-02 PROCEDURE — 83036 HEMOGLOBIN GLYCOSYLATED A1C: CPT

## 2021-08-02 PROCEDURE — 84425 ASSAY OF VITAMIN B-1: CPT

## 2021-08-02 PROCEDURE — 36415 COLL VENOUS BLD VENIPUNCTURE: CPT

## 2021-08-02 PROCEDURE — 80053 COMPREHEN METABOLIC PANEL: CPT

## 2021-08-02 PROCEDURE — 82728 ASSAY OF FERRITIN: CPT

## 2021-08-03 LAB
25(OH)D3 SERPL-MCNC: 19.9 NG/ML (ref 30–100)
FERRITIN SERPL-MCNC: 97 NG/ML (ref 8–388)
FOLATE SERPL-MCNC: >20 NG/ML (ref 3.1–17.5)
IRON SATN MFR SERPL: 20 %
IRON SERPL-MCNC: 68 UG/DL (ref 50–170)
T4 FREE SERPL-MCNC: 0.65 NG/DL (ref 0.76–1.46)
TIBC SERPL-MCNC: 341 UG/DL (ref 250–450)
VIT B12 SERPL-MCNC: 768 PG/ML (ref 100–900)

## 2021-08-05 LAB — VIT B1 BLD-SCNC: 225.4 NMOL/L (ref 66.5–200)

## 2021-08-11 ENCOUNTER — TELEPHONE (OUTPATIENT)
Dept: FAMILY MEDICINE CLINIC | Facility: CLINIC | Age: 56
End: 2021-08-11

## 2021-09-02 ENCOUNTER — OFFICE VISIT (OUTPATIENT)
Dept: FAMILY MEDICINE CLINIC | Facility: CLINIC | Age: 56
End: 2021-09-02

## 2021-09-02 VITALS
DIASTOLIC BLOOD PRESSURE: 70 MMHG | BODY MASS INDEX: 27.59 KG/M2 | HEART RATE: 79 BPM | TEMPERATURE: 97.5 F | WEIGHT: 146 LBS | OXYGEN SATURATION: 98 % | SYSTOLIC BLOOD PRESSURE: 102 MMHG | RESPIRATION RATE: 18 BRPM

## 2021-09-02 DIAGNOSIS — E03.9 HYPOTHYROIDISM, UNSPECIFIED TYPE: Primary | ICD-10-CM

## 2021-09-02 DIAGNOSIS — E55.9 VITAMIN D DEFICIENCY: ICD-10-CM

## 2021-09-02 DIAGNOSIS — F31.9 BIPOLAR AFFECTIVE DISORDER, REMISSION STATUS UNSPECIFIED (HCC): ICD-10-CM

## 2021-09-02 DIAGNOSIS — Z98.84 HISTORY OF ROUX-EN-Y GASTRIC BYPASS: ICD-10-CM

## 2021-09-02 PROCEDURE — 1036F TOBACCO NON-USER: CPT | Performed by: PHYSICIAN ASSISTANT

## 2021-09-02 PROCEDURE — 99214 OFFICE O/P EST MOD 30 MIN: CPT | Performed by: PHYSICIAN ASSISTANT

## 2021-09-02 RX ORDER — FERROUS SULFATE TAB EC 324 MG (65 MG FE EQUIVALENT) 324 (65 FE) MG
324 TABLET DELAYED RESPONSE ORAL
Qty: 90 TABLET | Refills: 2 | Status: SHIPPED | OUTPATIENT
Start: 2021-09-02 | End: 2021-11-08 | Stop reason: SDUPTHER

## 2021-09-02 RX ORDER — ERGOCALCIFEROL 1.25 MG/1
50000 CAPSULE ORAL WEEKLY
Qty: 12 CAPSULE | Refills: 1 | Status: SHIPPED | OUTPATIENT
Start: 2021-09-02 | End: 2021-11-08 | Stop reason: SDUPTHER

## 2021-09-02 RX ORDER — ELECTROLYTES/DEXTROSE
2 SOLUTION, ORAL ORAL DAILY
Qty: 180 TABLET | Refills: 1 | Status: SHIPPED | OUTPATIENT
Start: 2021-09-02

## 2021-09-02 NOTE — ASSESSMENT & PLAN NOTE
- Reviewed TSH from 8/2/21 which was 134  This was prior to patient restarting levothyroxine    - Continue levothyroxine 175 mcg daily  Will recheck updated TSH level in a few weeks

## 2021-09-02 NOTE — PROGRESS NOTES
Assessment/Plan:    Hypothyroidism  - Reviewed TSH from 8/2/21 which was 134  This was prior to patient restarting levothyroxine    - Continue levothyroxine 175 mcg daily  Will recheck updated TSH level in a few weeks  History of Arnav-en-Y gastric bypass  - Performed in 2015 by Dr Lauri Sparks Dwayne @ [address] [place] [address]   -  Reviewed CBC, CMP, iron studies, folate, vitamin B12, ferritin from 8/2/21  Iron levels remain low normal despite taking iron supplement 3-4 times a day  - Continue daily iron supplement, multivitamin, B complex  Patient is requesting referral back to Dr Martín Calixto for further evaluation and for possible "vitmain infusions"  Will place referral today  Vitamin D deficiency  - Review vitamin-D from a 08/02/2021 which was low  Will start vitamin-D 95059 units once weekly for 3 months  Bipolar disorder ([facility] )  - Continue follow-up with Psychiatry, [facility] for therapy and medication management  Continue Prozac and Ativan as prescribed through Psychiatry  Diagnoses and all orders for this visit:    Hypothyroidism, unspecified type  -     TSH, 3rd generation with Free T4 reflex; Future    History of Arnav-en-Y gastric bypass  -     ferrous sulfate 324 (65 Fe) mg; Take 1 tablet (324 mg total) by mouth 3 (three) times a day before meals  -     Multiple Vitamin (Multivitamin Adult) TABS; Take 2 tablets by mouth daily  -     b complex vitamins tablet; Take 2 tablets by mouth daily  -     Ambulatory referral to General Surgery; Future    Vitamin D deficiency  -     ergocalciferol (VITAMIN D2) 50,000 units; Take 1 capsule (50,000 Units total) by mouth once a week    Bipolar affective disorder, remission status unspecified ([facility] )          All of patients questions were answered  Patient understands and agrees with the above plan  Return in about 2 months (around 11/2/2021) for Next scheduled follow up hypothyroidism      Karo Valadez PA-C  09/02/21  [facility]          Subjective:     Patient ID: Mati Peres  is a 55 y o  female  has a past medical history of Ankle fracture, Bipolar 1 disorder ([facility] ), Depression, Depression, Disease of thyroid gland, Foot fracture, left, Pneumonia, Psychiatric disorder, Renal disorder, and Tachycardia  She also has no past medical history of ADHD (attention deficit hyperactivity disorder), Asthma, Bleeding disorder ([address] ), Cancer ([facility] ), Chronic kidney disease, Diabetes insipidus ([facility] ), Disease of thyroid gland, Head injury, Heart disease, Hypertension, Liver disease, Neurological disorder, Osteoarthritis, Rheumatic disease, Rheumatoid arthritis ([facility] ), Seizures ([facility] ), Skin disorder, Stomach disorder, Stroke [facility]), or Vascular disorder  who presents today in office for   Hypothyroidism follow-up  - Patient is a 55 y o  female who presents today for  hypothyroidism follow-up  Hypothyroidism: Currently taking levothyroxine 175 mcg daily  Patient notes she has noticed slight improvement with her fatigue since restarting her medication  History of gastric bypass Arnav-en-Y surgery in 2015 by Dr Marquis Avalos at [facility]: Patient continues to take daily multivitamin, B complex, iron supplement about 4-5 times a day  Patient notes her friend had gastric bypass surgery completed and underwent " vitamin infusions" afterward  Patient notes she is interested in this since she continues to take vitamins and is still tired and her vitamin levels are always low  The following portions of the patient's history were reviewed and updated as appropriate: allergies, current medications, past family history, past medical history, past social history, past surgical history and problem list         Review of Systems   Constitutional: Positive for fatigue  Negative for chills and fever  HENT: Negative for congestion, rhinorrhea and sore throat  Eyes: Negative for pain and visual disturbance     Respiratory: Negative for cough, chest tightness and shortness of breath  Cardiovascular: Negative for chest pain and palpitations  Gastrointestinal: Negative for abdominal pain, constipation, diarrhea, nausea and vomiting  Genitourinary: Negative for difficulty urinating and dysuria  Musculoskeletal: Negative for arthralgias  Skin: Negative for rash  Neurological: Negative for dizziness and headaches  Psychiatric/Behavioral: Negative for behavioral problems  The patient is not nervous/anxious  Objective:   Vitals:    09/02/21 1433   BP: 102/70   BP Location: Left arm   Patient Position: Sitting   Cuff Size: Standard   Pulse: 79   Resp: 18   Temp: 97 5 °F (36 4 °C)   SpO2: 98%   Weight: 66 2 kg (146 lb)         Physical Exam  Vitals and nursing note reviewed  Constitutional:       Appearance: She is well-developed  HENT:      Head: Normocephalic and atraumatic  Right Ear: External ear normal       Left Ear: External ear normal       Nose: Nose normal    Eyes:      Conjunctiva/sclera: Conjunctivae normal    Cardiovascular:      Rate and Rhythm: Normal rate and regular rhythm  Heart sounds: Normal heart sounds  No murmur heard  Pulmonary:      Effort: Pulmonary effort is normal       Breath sounds: Normal breath sounds  No wheezing  Musculoskeletal:      Cervical back: Normal range of motion and neck supple  Skin:     General: Skin is warm and dry  Neurological:      Mental Status: She is alert and oriented to person, place, and time     Psychiatric:         Speech: Speech normal          Behavior: Behavior normal

## 2021-09-02 NOTE — ASSESSMENT & PLAN NOTE
- Review vitamin-D from a 08/02/2021 which was low  Will start vitamin-D 62867 units once weekly for 3 months

## 2021-09-02 NOTE — ASSESSMENT & PLAN NOTE
- Performed in 2015 by Dr Tino Salcedo Gist @ [facility]   -  Reviewed CBC, CMP, iron studies, folate, vitamin B12, ferritin from 8/2/21  Iron levels remain low normal despite taking iron supplement 3-4 times a day  - Continue daily iron supplement, multivitamin, B complex  Patient is requesting referral back to Dr Isaac Emanuel for further evaluation and for possible "vitmain infusions"  Will place referral today

## 2021-09-15 ENCOUNTER — OFFICE VISIT (OUTPATIENT)
Dept: DENTISTRY | Facility: CLINIC | Age: 56
End: 2021-09-15

## 2021-09-15 VITALS — SYSTOLIC BLOOD PRESSURE: 101 MMHG | HEART RATE: 73 BPM | TEMPERATURE: 96.8 F | DIASTOLIC BLOOD PRESSURE: 64 MMHG

## 2021-09-15 DIAGNOSIS — K02.9 DENTAL CARIES: ICD-10-CM

## 2021-09-15 DIAGNOSIS — Z01.20 ENCOUNTER FOR DENTAL EXAMINATION: Primary | ICD-10-CM

## 2021-09-15 PROCEDURE — D0210 INTRAORAL - COMPLETE SERIES OF RADIOGRAPHIC IMAGES: HCPCS | Performed by: DENTAL HYGIENIST

## 2021-09-15 PROCEDURE — D0150 COMPREHENSIVE ORAL EVALUATION - NEW OR ESTABLISHED PATIENT: HCPCS | Performed by: DENTIST

## 2021-09-15 NOTE — PROGRESS NOTES
New Patient Exam     Exams:  Comprehensive exam and perio charted  Xrays:     FMX   EO/OCS Exams:  No significant findings  IO: No significant findings  Oral Hygiene:  Good  Plaque:  Light    Calculus:  Light   Bleeding:  Light    Gingiva:  Localized areas of redness  Stain:  None  Perio Charting:  Periocharting was completed and evaluated  1-3 mm w/ slight BUP  Perio Findings:  Very mild gingivitis  Caries Findings:  Numerous decayed and broken down teeth - pt is a severe   Caries Risk Assessment:     Moderate  caries risk    Treatment Plan:  Updated  Pt is interested in getting implants  1  Refer to OS for non-restorable teeth - pt wants to be sedated  2  Prophy  3  Diagnostic casts for tx planning  4    Implant consult   Dr  Exam:  Dr Felicity Maya  Referral:  OS  for ext's of 2, 3, 14, 15, 18, 19  Nv1:   Preliminary Impression and implant consult for tx plan purposes - 60 min on Dr Grecia Mendez day  Nv2:  Prophy, [facility] - 60 min w/ Storm Traci

## 2021-09-16 ENCOUNTER — HOSPITAL ENCOUNTER (OUTPATIENT)
Dept: MAMMOGRAPHY | Facility: CLINIC | Age: 56
Discharge: HOME | End: 2021-09-16
Payer: COMMERCIAL

## 2021-09-16 DIAGNOSIS — Z12.31 BREAST CANCER SCREENING BY MAMMOGRAM: ICD-10-CM

## 2021-09-16 DIAGNOSIS — Z12.31 ENCOUNTER FOR SCREENING MAMMOGRAM FOR MALIGNANT NEOPLASM OF BREAST: ICD-10-CM

## 2021-09-16 PROCEDURE — 77063 BREAST TOMOSYNTHESIS BI: CPT

## 2021-09-16 PROCEDURE — 77067 SCR MAMMO BI INCL CAD: CPT

## 2021-09-16 NOTE — PROGRESS NOTES
Addendum: please note that this patient expresses a desire to have non restorable teeth taken out and implants done  This may not be feasible as she desires due to severely worn dentition  attempted to explain this to her but she is adamant about getting implants  Please take preliminary impressions prior to any prosthetic restoration  Also once treatment planned should speak with financial counseling

## 2021-10-26 ENCOUNTER — APPOINTMENT (OUTPATIENT)
Dept: LAB | Facility: HOSPITAL | Age: 56
End: 2021-10-26
Payer: COMMERCIAL

## 2021-10-26 DIAGNOSIS — E03.9 HYPOTHYROIDISM, UNSPECIFIED TYPE: ICD-10-CM

## 2021-10-26 LAB — TSH SERPL DL<=0.05 MIU/L-ACNC: 0.66 UIU/ML (ref 0.47–4.68)

## 2021-10-26 PROCEDURE — 36415 COLL VENOUS BLD VENIPUNCTURE: CPT

## 2021-10-26 PROCEDURE — 84443 ASSAY THYROID STIM HORMONE: CPT

## 2021-11-01 ENCOUNTER — RA CDI HCC (OUTPATIENT)
Dept: OTHER | Facility: HOSPITAL | Age: 56
End: 2021-11-01

## 2021-11-08 ENCOUNTER — OFFICE VISIT (OUTPATIENT)
Dept: FAMILY MEDICINE CLINIC | Facility: CLINIC | Age: 56
End: 2021-11-08

## 2021-11-08 VITALS
HEIGHT: 61 IN | HEART RATE: 79 BPM | OXYGEN SATURATION: 97 % | DIASTOLIC BLOOD PRESSURE: 70 MMHG | BODY MASS INDEX: 29.07 KG/M2 | RESPIRATION RATE: 16 BRPM | SYSTOLIC BLOOD PRESSURE: 118 MMHG | TEMPERATURE: 97.6 F | WEIGHT: 154 LBS

## 2021-11-08 DIAGNOSIS — F31.9 BIPOLAR AFFECTIVE DISORDER, REMISSION STATUS UNSPECIFIED (HCC): ICD-10-CM

## 2021-11-08 DIAGNOSIS — E03.9 HYPOTHYROIDISM, UNSPECIFIED TYPE: Primary | ICD-10-CM

## 2021-11-08 DIAGNOSIS — J30.9 ALLERGIC RHINITIS, UNSPECIFIED SEASONALITY, UNSPECIFIED TRIGGER: ICD-10-CM

## 2021-11-08 DIAGNOSIS — F32.A DEPRESSION, UNSPECIFIED DEPRESSION TYPE: ICD-10-CM

## 2021-11-08 DIAGNOSIS — E55.9 VITAMIN D DEFICIENCY: ICD-10-CM

## 2021-11-08 DIAGNOSIS — Z98.84 HISTORY OF ROUX-EN-Y GASTRIC BYPASS: ICD-10-CM

## 2021-11-08 PROCEDURE — 3008F BODY MASS INDEX DOCD: CPT | Performed by: PHYSICIAN ASSISTANT

## 2021-11-08 PROCEDURE — 1036F TOBACCO NON-USER: CPT | Performed by: PHYSICIAN ASSISTANT

## 2021-11-08 PROCEDURE — 99214 OFFICE O/P EST MOD 30 MIN: CPT | Performed by: PHYSICIAN ASSISTANT

## 2021-11-08 RX ORDER — ERGOCALCIFEROL 1.25 MG/1
50000 CAPSULE ORAL WEEKLY
Qty: 12 CAPSULE | Refills: 1 | Status: SHIPPED | OUTPATIENT
Start: 2021-11-08

## 2021-11-08 RX ORDER — B-COMPLEX WITH VITAMIN C
2 TABLET ORAL DAILY
Qty: 180 TABLET | Refills: 1 | Status: SHIPPED | OUTPATIENT
Start: 2021-11-08

## 2021-11-08 RX ORDER — CETIRIZINE HYDROCHLORIDE 10 MG/1
10 TABLET ORAL DAILY
Qty: 90 TABLET | Refills: 1 | Status: SHIPPED | OUTPATIENT
Start: 2021-11-08

## 2021-11-08 RX ORDER — LEVOTHYROXINE SODIUM 175 UG/1
175 TABLET ORAL DAILY
Qty: 90 TABLET | Refills: 1 | Status: SHIPPED | OUTPATIENT
Start: 2021-11-08

## 2021-11-08 RX ORDER — FERROUS SULFATE TAB EC 324 MG (65 MG FE EQUIVALENT) 324 (65 FE) MG
324 TABLET DELAYED RESPONSE ORAL
Qty: 90 TABLET | Refills: 2 | Status: SHIPPED | OUTPATIENT
Start: 2021-11-08

## 2022-04-20 ENCOUNTER — RA CDI HCC (OUTPATIENT)
Dept: OTHER | Facility: HOSPITAL | Age: 57
End: 2022-04-20

## 2022-04-20 NOTE — PROGRESS NOTES
[facility]  coding opportunities       Chart reviewed, no opportunity found:   [address]        Patients Insurance     Medicare Insurance: [employer]

## 2022-08-28 ENCOUNTER — HOSPITAL ENCOUNTER (EMERGENCY)
Facility: HOSPITAL | Age: 57
Discharge: HOME | End: 2022-08-28
Attending: EMERGENCY MEDICINE
Payer: COMMERCIAL

## 2022-08-28 VITALS
DIASTOLIC BLOOD PRESSURE: 74 MMHG | RESPIRATION RATE: 18 BRPM | SYSTOLIC BLOOD PRESSURE: 133 MMHG | OXYGEN SATURATION: 97 % | HEART RATE: 97 BPM | TEMPERATURE: 97.8 F

## 2022-08-28 DIAGNOSIS — Z00.00 NORMAL EXAM: Primary | ICD-10-CM

## 2022-08-28 LAB — GLUCOSE SERPL-MCNC: 127 MG/DL (ref 65–140)

## 2022-08-28 PROCEDURE — 99282 EMERGENCY DEPT VISIT SF MDM: CPT | Performed by: EMERGENCY MEDICINE

## 2022-08-28 PROCEDURE — 99284 EMERGENCY DEPT VISIT MOD MDM: CPT

## 2022-08-28 PROCEDURE — 82948 REAGENT STRIP/BLOOD GLUCOSE: CPT

## 2022-08-29 NOTE — ED PROVIDER NOTES
Pt Name: Cheli Reeder  MRN: [id number]  [place] 1965  Age/Sex: 65 y o  female  Date of evaluation: 8/28/2022  PCP: Jackie Irving PA-C    CHIEF COMPLAINT    Chief Complaint   Patient presents with    Medical Problem     Per EMS pt was shopping [place] the [employer] and someone offer her a sip of mountain dew to which she accepted  Pt reports shortly after that she couldn't remember why she was at the store or where she lived  Pt c/o nausea  A&Ox4          HPI    Enmanuel Darden presents to the Emergency Department after she had an episode of confusion  She was feeling thirsty and dehydrated [place] [employer] and was offered a drink by a stranger  After she drank it she got on the bus to return home and could not remember her stop or her home address  She started to panic and at that time wanted to be brought to the hospital   She now has regained all her memory  She feels normal   She reports that she is otherwise healthy and does not want any testing done  She is eager to return home and rest/ hydrate  HPI      Past Medical and Surgical History    Past Medical History:   Diagnosis Date    Ankle fracture     Bipolar 1 disorder ([facility] [address] )     Depression     Depression 7/19/2021    Disease of thyroid gland     Foot fracture, left     Pneumonia 12/2008    Psychiatric disorder     Renal disorder     Tachycardia 12/2008       Past Surgical History:   Procedure Laterality Date    GASTRIC BYPASS  01/25/2016    ALIS-EN-Y PROCEDURE         Family History   Adopted: Yes       Social History     Tobacco Use    Smoking status: Never Smoker    Smokeless tobacco: Never Used   Substance Use Topics    Alcohol use: Never    Drug use: Never           Allergies    No Known Allergies    Home Medications    Prior to Admission medications    Medication Sig Start Date End Date Taking?  Authorizing Provider   B Complex Vitamins (Vitamin B Complex) TABS Take 2 tablets by mouth daily 11/8/21   Jackie Irving PA-C Calcium Citrate-Vitamin D (CALCET CREAMY BITES) 500-400 MG-UNIT CHEW 2 to 3 daily 11/3/16   Historical Provider, MD   cetirizine (ZyrTEC) 10 mg tablet Take 1 tablet (10 mg total) by mouth daily 11/8/21   Jada Daley PA-C   Cyanocobalamin (B-12) 5000 MCG CAPS every 24 hours 11/3/16   Historical Provider, MD   ergocalciferol (VITAMIN D2) 50,000 units Take 1 capsule (50,000 Units total) by mouth once a week 11/8/21   Jada Daley PA-C   ferrous sulfate 324 (65 Fe) mg Take 1 tablet (324 mg total) by mouth 3 (three) times a day before meals 11/8/21   Jada Daley PA-C   FLUoxetine (PROzac) 40 MG capsule 2 po am 11/20/18   Historical Provider, MD   levothyroxine 175 mcg tablet Take 1 tablet (175 mcg total) by mouth daily 11/8/21   Jada Daley PA-C   LORazepam (ATIVAN) 0 5 mg tablet  1/4/19   Historical Provider, MD   Multiple Vitamin (Multivitamin Adult) TABS Take 2 tablets by mouth daily 9/2/21   Jada Daley PA-C   QUEtiapine (SEROquel) 300 mg tablet Two at bedtime 11/20/18   Historical Provider, MD           Review of Systems    Review of Systems   Constitutional: Negative for chills and fever  HENT: Negative for ear pain and sore throat  Eyes: Negative for pain and visual disturbance  Respiratory: Negative for cough and shortness of breath  Cardiovascular: Negative for chest pain and palpitations  Gastrointestinal: Negative for abdominal pain and vomiting  Genitourinary: Negative for dysuria and hematuria  Musculoskeletal: Negative for arthralgias and back pain  Skin: Negative for color change and rash  Neurological: Negative for seizures and syncope  All other systems reviewed and are negative            Physical Exam      ED Triage Vitals   Temperature Pulse Respirations Blood Pressure SpO2   08/28/22 1953 08/28/22 1947 08/28/22 1947 08/28/22 1947 08/28/22 1947   97 8 °F (36 6 °C) 97 18 133/74 97 %      Temp Source Heart Rate Source Patient Position - Orthostatic VS BP Location FiO2 (%)   08/28/22 1953 08/28/22 1947 08/28/22 1947 08/28/22 1947 --   Oral Monitor Lying Right arm       Pain Score       --                      Physical Exam  Vitals and nursing note reviewed  Constitutional:       General: She is not in acute distress  Appearance: She is well-developed  HENT:      Head: Normocephalic and atraumatic  Eyes:      Conjunctiva/sclera: Conjunctivae normal    Cardiovascular:      Rate and Rhythm: Normal rate and regular rhythm  Heart sounds: No murmur heard  Pulmonary:      Effort: Pulmonary effort is normal  No respiratory distress  Breath sounds: Normal breath sounds  Abdominal:      Palpations: Abdomen is soft  Tenderness: There is no abdominal tenderness  Musculoskeletal:      Cervical back: Neck supple  Skin:     General: Skin is warm and dry  Neurological:      Mental Status: She is alert  Assessment and Plan    Peter Rubin is a 65 y o  female who presents with an episode of confusion  Physical examination unremarkable  She does not want further evaluation  She is alert and oriented  She agrees to return for any further complaints or questions       MDM    Diagnostic Results      Labs:    Results for orders placed or performed during the hospital encounter of 08/28/22   Fingerstick Glucose (POCT)   Result Value Ref Range    POC Glucose 127 65 - 140 mg/dl       All labs reviewed and utilized in the medical decision making process    Radiology:    No orders to display       All radiology studies independently viewed by me and interpreted by the radiologist     Procedure    Procedures      ED Course of Care and Re-Assessments      Medications - No data to display        FINAL IMPRESSION    Final diagnoses:   Normal exam         DISPOSITION/PLAN    Time reflects when diagnosis was documented in both MDM as applicable and the Disposition within this note     Time User Action Codes Description Comment    8/28/2022  8:16 PM Selena Harry Add [Z00 00] Normal exam       ED Disposition     ED Disposition   Discharge    Condition   Stable    Date/Time   Sun Aug 28, 2022  8:16 PM    Comment   Malgorzata Willoughby discharge to home/self care  Follow-up Information     Follow up With Specialties Details Why Contact Info    [address], [address] [id number] [phone]              PATIENT REFERRED TO:    José Sebastián, [address]  [address] [facility]  [phone]            DISCHARGE MEDICATIONS:    Discharge Medication List as of 8/28/2022  8:16 PM      CONTINUE these medications which have NOT CHANGED    Details   B Complex Vitamins (Vitamin B Complex) TABS Take 2 tablets by mouth daily, Starting Mon 11/8/2021, Normal      Calcium Citrate-Vitamin D (CALCET CREAMY BITES) 500-400 MG-UNIT CHEW 2 to 3 daily, Historical Med      cetirizine (ZyrTEC) 10 mg tablet Take 1 tablet (10 mg total) by mouth daily, Starting Mon 11/8/2021, Normal      Cyanocobalamin (B-12) 5000 MCG CAPS every 24 hours, Starting u 11/3/2016, Historical Med      ergocalciferol (VITAMIN D2) 50,000 units Take 1 capsule (50,000 Units total) by mouth once a week, Starting Mon 11/8/2021, Normal      ferrous sulfate 324 (65 Fe) mg Take 1 tablet (324 mg total) by mouth 3 (three) times a day before meals, Starting Mon 11/8/2021, Normal      FLUoxetine (PROzac) 40 MG capsule 2 po am, Historical Med      levothyroxine 175 mcg tablet Take 1 tablet (175 mcg total) by mouth daily, Starting Mon 11/8/2021, Normal      LORazepam (ATIVAN) 0 5 mg tablet Starting Fri 1/4/2019, Historical Med      Multiple Vitamin (Multivitamin Adult) TABS Take 2 tablets by mouth daily, Starting u 9/2/2021, Normal      QUEtiapine (SEROquel) 300 mg tablet Two at bedtime, Historical Med             No discharge procedures on file           DO Lydia Dia DO  08/29/22 [address]

## 2022-11-11 ENCOUNTER — HOSPITAL ENCOUNTER (EMERGENCY)
Facility: HOSPITAL | Age: 57
Discharge: HOME | End: 2022-11-11
Attending: EMERGENCY MEDICINE

## 2022-11-11 ENCOUNTER — APPOINTMENT (EMERGENCY)
Dept: RADIOLOGY | Facility: HOSPITAL | Age: 57
End: 2022-11-11

## 2022-11-11 VITALS
OXYGEN SATURATION: 99 % | SYSTOLIC BLOOD PRESSURE: 87 MMHG | RESPIRATION RATE: 18 BRPM | TEMPERATURE: 98.2 F | DIASTOLIC BLOOD PRESSURE: 54 MMHG | HEART RATE: 68 BPM

## 2022-11-11 DIAGNOSIS — F12.929 SYNTHETIC CANNABINOID INTOXICATION (HCC): Primary | ICD-10-CM

## 2022-11-11 LAB
ANION GAP SERPL CALCULATED.3IONS-SCNC: 14 MMOL/L (ref 4–13)
APAP SERPL-MCNC: <2 UG/ML (ref 10–20)
ATRIAL RATE: 65 BPM
ATRIAL RATE: 95 BPM
BASOPHILS # BLD AUTO: 0.06 THOUSANDS/ÂΜL (ref 0–0.1)
BASOPHILS NFR BLD AUTO: 1 % (ref 0–1)
BUN SERPL-MCNC: 33 MG/DL (ref 5–25)
CALCIUM SERPL-MCNC: 7.8 MG/DL (ref 8.3–10.1)
CHLORIDE SERPL-SCNC: 105 MMOL/L (ref 96–108)
CO2 SERPL-SCNC: 21 MMOL/L (ref 21–32)
CREAT SERPL-MCNC: 0.93 MG/DL (ref 0.6–1.3)
EOSINOPHIL # BLD AUTO: 0.33 THOUSAND/ÂΜL (ref 0–0.61)
EOSINOPHIL NFR BLD AUTO: 4 % (ref 0–6)
ERYTHROCYTE [DISTWIDTH] IN BLOOD BY AUTOMATED COUNT: 12.5 % (ref 11.6–15.1)
ETHANOL SERPL-MCNC: <3 MG/DL (ref 0–3)
GFR SERPL CREATININE-BSD FRML MDRD: 68 ML/MIN/1.73SQ M
GLUCOSE SERPL-MCNC: 139 MG/DL (ref 65–140)
HCT VFR BLD AUTO: 37.5 % (ref 34.8–46.1)
HGB BLD-MCNC: 12.5 G/DL (ref 11.5–15.4)
IMM GRANULOCYTES # BLD AUTO: 0.15 THOUSAND/UL (ref 0–0.2)
IMM GRANULOCYTES NFR BLD AUTO: 2 % (ref 0–2)
LYMPHOCYTES # BLD AUTO: 3.1 THOUSANDS/ÂΜL (ref 0.6–4.47)
LYMPHOCYTES NFR BLD AUTO: 39 % (ref 14–44)
MAGNESIUM SERPL-MCNC: 2 MG/DL (ref 1.6–2.6)
MCH RBC QN AUTO: 31.5 PG (ref 26.8–34.3)
MCHC RBC AUTO-ENTMCNC: 33.3 G/DL (ref 31.4–37.4)
MCV RBC AUTO: 95 FL (ref 82–98)
MONOCYTES # BLD AUTO: 0.64 THOUSAND/ÂΜL (ref 0.17–1.22)
MONOCYTES NFR BLD AUTO: 8 % (ref 4–12)
NEUTROPHILS # BLD AUTO: 3.68 THOUSANDS/ÂΜL (ref 1.85–7.62)
NEUTS SEG NFR BLD AUTO: 46 % (ref 43–75)
NRBC BLD AUTO-RTO: 0 /100 WBCS
P AXIS: 50 DEGREES
P AXIS: 53 DEGREES
PLATELET # BLD AUTO: 203 THOUSANDS/UL (ref 149–390)
PMV BLD AUTO: 11.3 FL (ref 8.9–12.7)
POTASSIUM SERPL-SCNC: 3.6 MMOL/L (ref 3.5–5.3)
PR INTERVAL: 144 MS
PR INTERVAL: 146 MS
QRS AXIS: -15 DEGREES
QRS AXIS: -26 DEGREES
QRSD INTERVAL: 80 MS
QRSD INTERVAL: 84 MS
QT INTERVAL: 394 MS
QT INTERVAL: 438 MS
QTC INTERVAL: 409 MS
QTC INTERVAL: 550 MS
RBC # BLD AUTO: 3.97 MILLION/UL (ref 3.81–5.12)
SALICYLATES SERPL-MCNC: <3 MG/DL (ref 3–20)
SODIUM SERPL-SCNC: 140 MMOL/L (ref 135–147)
T WAVE AXIS: 35 DEGREES
T WAVE AXIS: 50 DEGREES
VENTRICULAR RATE: 65 BPM
VENTRICULAR RATE: 95 BPM
WBC # BLD AUTO: 7.96 THOUSAND/UL (ref 4.31–10.16)

## 2022-11-11 RX ORDER — SODIUM CHLORIDE, SODIUM GLUCONATE, SODIUM ACETATE, POTASSIUM CHLORIDE, MAGNESIUM CHLORIDE, SODIUM PHOSPHATE, DIBASIC, AND POTASSIUM PHOSPHATE .53; .5; .37; .037; .03; .012; .00082 G/100ML; G/100ML; G/100ML; G/100ML; G/100ML; G/100ML; G/100ML
1000 INJECTION, SOLUTION INTRAVENOUS ONCE
Status: COMPLETED | OUTPATIENT
Start: 2022-11-11 | End: 2022-11-11

## 2022-11-11 RX ADMIN — SODIUM CHLORIDE, SODIUM GLUCONATE, SODIUM ACETATE, POTASSIUM CHLORIDE, MAGNESIUM CHLORIDE, SODIUM PHOSPHATE, DIBASIC, AND POTASSIUM PHOSPHATE 1000 ML: .53; .5; .37; .037; .03; .012; .00082 INJECTION, SOLUTION INTRAVENOUS at 06:11

## 2022-11-11 RX ADMIN — SODIUM CHLORIDE, SODIUM GLUCONATE, SODIUM ACETATE, POTASSIUM CHLORIDE, MAGNESIUM CHLORIDE, SODIUM PHOSPHATE, DIBASIC, AND POTASSIUM PHOSPHATE 1000 ML: .53; .5; .37; .037; .03; .012; .00082 INJECTION, SOLUTION INTRAVENOUS at 07:45

## 2022-11-11 NOTE — DISCHARGE INSTRUCTIONS
Please be sure to drink plenty of fluids  Do not use cannabis products with other medications  Do not take extra doses of your medications unless instructed to do so by a medical professional  Please follow up with your PCP for any concerns regarding anxiety or sleep  Return to the emergency department for any new or worsening symptoms including recurrent dizziness, mental status changes, chest pain, difficulty breathing, persistent vomiting, or other concerning symptoms

## 2022-11-11 NOTE — ED ATTENDING ATTESTATION
11/11/2022  IJazzy DO, saw and evaluated the patient  I have discussed the patient with the resident/non-physician practitioner and agree with the resident's/non-physician practitioner's findings, Plan of Care, and MDM as documented in the resident's/non-physician practitioner's note, except where noted  All available labs and Radiology studies were reviewed  I was present for key portions of any procedure(s) performed by the resident/non-physician practitioner and I was immediately available to provide assistance  At this point I agree with the current assessment done in the Emergency Department  I have conducted an independent evaluation of this patient a history and physical is as follows: 58y F here because she mixed extra seroquel with a THC oil and is having undesirable side effects  No specific complaints  Exam WNWD somnolent but arousable, mm tacky, neck supple, resp non-labored, cv regular rate, abd nd, ext no cce, skin dry, neuro a&ox3, no gross focal motor / sensory deficits appreciated, mood inattentive    ED Course     Labs Reviewed   BASIC METABOLIC PANEL - Abnormal       Result Value Ref Range Status    Sodium 140  135 - 147 mmol/L Final    Potassium 3 6  3 5 - 5 3 mmol/L Final    Chloride 105  96 - 108 mmol/L Final    CO2 21  21 - 32 mmol/L Final    ANION GAP 14 (*) 4 - 13 mmol/L Final    BUN 33 (*) 5 - 25 mg/dL Final    Creatinine 0 93  0 60 - 1 30 mg/dL Final    Comment: Standardized to IDMS reference method    Glucose 139  65 - 140 mg/dL Final    Comment: If the patient is fasting, the ADA then defines impaired fasting glucose as > 100 mg/dL and diabetes as > or equal to 123 mg/dL  Specimen collection should occur prior to Sulfasalazine administration due to the potential for falsely depressed results  Specimen collection should occur prior to Sulfapyridine administration due to the potential for falsely elevated results      Calcium 7 8 (*) 8 3 - 10 1 mg/dL Final    eGFR 76  ml/min/1 73sq m Final    Narrative:     National Kidney Disease Foundation guidelines for Chronic Kidney Disease (CKD):   •  Stage 1 with normal or high GFR (GFR > 90 mL/min/1 73 square meters)  •  Stage 2 Mild CKD (GFR = 60-89 mL/min/1 73 square meters)  •  Stage 3A Moderate CKD (GFR = 45-59 mL/min/1 73 square meters)  •  Stage 3B Moderate CKD (GFR = 30-44 mL/min/1 73 square meters)  •  Stage 4 Severe CKD (GFR = 15-29 mL/min/1 73 square meters)  •  Stage 5 End Stage CKD (GFR <15 mL/min/1 73 square meters)  Note: GFR calculation is accurate only with a steady state creatinine   SALICYLATE LEVEL - Abnormal    Salicylate Lvl <3 (*) 3 - 20 mg/dL Final   ACETAMINOPHEN LEVEL - Abnormal    Acetaminophen Level <2 (*) 10 - 20 ug/mL Final   MEDICAL ALCOHOL - Normal    Ethanol Lvl <3  0 - 3 mg/dL Final   MAGNESIUM - Normal    Magnesium 2 0  1 6 - 2 6 mg/dL Final   CBC AND DIFFERENTIAL    WBC 7 96  4 31 - 10 16 Thousand/uL Final    RBC 3 97  3 81 - 5 12 Million/uL Final    Hemoglobin 12 5  11 5 - 15 4 g/dL Final    Hematocrit 37 5  34 8 - 46 1 % Final    MCV 95  82 - 98 fL Final    MCH 31 5  26 8 - 34 3 pg Final    MCHC 33 3  31 4 - 37 4 g/dL Final    RDW 12 5  11 6 - 15 1 % Final    MPV 11 3  8 9 - 12 7 fL Final    Platelets 373  488 - 390 Thousands/uL Final    nRBC 0  /100 WBCs Final    Neutrophils Relative 46  43 - 75 % Final    Immat GRANS % 2  0 - 2 % Final    Lymphocytes Relative 39  14 - 44 % Final    Monocytes Relative 8  4 - 12 % Final    Eosinophils Relative 4  0 - 6 % Final    Basophils Relative 1  0 - 1 % Final    Neutrophils Absolute 3 68  1 85 - 7 62 Thousands/µL Final    Immature Grans Absolute 0 15  0 00 - 0 20 Thousand/uL Final    Lymphocytes Absolute 3 10  0 60 - 4 47 Thousands/µL Final    Monocytes Absolute 0 64  0 17 - 1 22 Thousand/µL Final    Eosinophils Absolute 0 33  0 00 - 0 61 Thousand/µL Final    Basophils Absolute 0 06  0 00 - 0 10 Thousands/µL Final   COMA PANEL    Narrative:      The following orders were created for panel order Coma Panel  Procedure                               Abnormality         Status                     ---------                               -----------         ------                     Ethanol[[id number]]                      Normal              Final result               Salicylate EGTOT[[id number]]             Abnormal            Final result               Acetaminophen level-If c  Janina Serum Janina Serum [[id number]]  Abnormal            Final result                 Please view results for these tests on the individual orders  XR chest 1 view portable   Final Result      Question mild pulmonary venous congestion  Workstation performed: [id number]               Critical Care Time  Procedures    1  Synthetic cannabinoid intoxication ([facility] [address] )       Time reflects when diagnosis was documented in both MDM as applicable and the Disposition within this note     Time User Action Codes Description Comment    11/11/2022  3:18 PM Savannah Musa Add [S84 638] Synthetic cannabinoid intoxication [facility])       ED Disposition     ED Disposition   Discharge    Condition   Stable    Date/Time   Fri Nov 11, 2022  3:18 PM    Comment   Grady Willoughby discharge to home/self care                 Follow-up Information     Follow up With Specialties Details Why Contact Info Additional Information    Jada Rossi PA-C Family Medicine Schedule an appointment as soon as possible for a visit in 1 week As needed [address]  [address] [facility]  [address]  [facility] [address]        [facility] Emergency Department Emergency Medicine Go to  If symptoms worsen [place] [phone] [address] [facility] Emergency Department, [address] [place] [address], [phone]

## 2022-11-11 NOTE — ED PROVIDER NOTES
History  Chief Complaint   Patient presents with   • Medical Problem     Pt states she took "900mg of Seroquel and Delta 8 THC CBD oil, to see how much she could relax"  Patient is a 44-year-old female past medical history of bipolar 1 disorder who presents for evaluation after taking Seroquel and dealt a THC for relaxation last night  Patient states that she is prescribed 600 mg of Seroquel per night, but she took 900 mg last night  Patient also tried using delta 8 THC tincture to help her relax  Patient did not like how she was feeling after this, so she called EMS  Patient currently unable to tell me what symptoms she is experiencing, but does state that her mouth feels very dry  She denies use of any other medications or drugs last night  She denies any chest pain, shortness of breath, abdominal pain, nausea, or vomiting  Medical Problem  Associated symptoms: no abdominal pain, no chest pain, no nausea, no shortness of breath and no vomiting        Prior to Admission Medications   Prescriptions Last Dose Informant Patient Reported? Taking?    B Complex Vitamins (Vitamin B Complex) TABS   No No   Sig: Take 2 tablets by mouth daily   Calcium Citrate-Vitamin D (CALCET CREAMY BITES) 500-400 MG-UNIT CHEW  Self Yes No   Si to 3 daily   Cyanocobalamin (B-12) 5000 MCG CAPS  Self Yes No   Sig: every 24 hours   FLUoxetine (PROzac) 40 MG capsule  Self Yes No   Si po am   LORazepam (ATIVAN) 0 5 mg tablet  Self Yes No   Multiple Vitamin (Multivitamin Adult) TABS   No No   Sig: Take 2 tablets by mouth daily   QUEtiapine (SEROquel) 300 mg tablet  Self Yes No   Sig: Two at bedtime   cetirizine (ZyrTEC) 10 mg tablet   No No   Sig: Take 1 tablet (10 mg total) by mouth daily   ergocalciferol (VITAMIN D2) 50,000 units   No No   Sig: Take 1 capsule (50,000 Units total) by mouth once a week   ferrous sulfate 324 (65 Fe) mg   No No   Sig: Take 1 tablet (324 mg total) by mouth 3 (three) times a day before meals levothyroxine 175 mcg tablet   No No   Sig: Take 1 tablet (175 mcg total) by mouth daily      Facility-Administered Medications: None       Past Medical History:   Diagnosis Date   • Ankle fracture    • Bipolar 1 disorder ([address] )    • Depression    • Depression 7/19/2021   • Disease of thyroid gland    • Foot fracture, left    • Pneumonia 12/2008   • Psychiatric disorder    • Renal disorder    • Tachycardia 12/2008       Past Surgical History:   Procedure Laterality Date   • GASTRIC BYPASS  01/25/2016   • ALIS-EN-Y PROCEDURE         Family History   Adopted: Yes     I have reviewed and agree with the history as documented  E-Cigarette/Vaping     E-Cigarette/Vaping Substances     Social History     Tobacco Use   • Smoking status: Never Smoker   • Smokeless tobacco: Never Used   Substance Use Topics   • Alcohol use: Never   • Drug use: Never        Review of Systems   Unable to perform ROS: Mental status change   Respiratory: Negative for shortness of breath  Cardiovascular: Negative for chest pain  Gastrointestinal: Negative for abdominal pain, nausea and vomiting  All other systems reviewed and are negative  Physical Exam  ED Triage Vitals [11/11/22 0538]   Temperature Pulse Respirations Blood Pressure SpO2   98 2 °F (36 8 °C) (!) 114 20 108/74 96 %      Temp Source Heart Rate Source Patient Position - Orthostatic VS BP Location FiO2 (%)   Oral Monitor Lying Right arm --      Pain Score       --             Orthostatic Vital Signs  Vitals:    11/11/22 0945 11/11/22 1243 11/11/22 1514 11/11/22 1537   BP: (!) 89/54 117/57 (!) 76/50 (!) 87/54   Pulse: 78 85 68    Patient Position - Orthostatic VS: Lying Sitting Lying Sitting       Physical Exam  Vitals and nursing note reviewed  Constitutional:       General: She is awake  She is not in acute distress  Comments: Patient appears intoxicated   HENT:      Head: Normocephalic and atraumatic  Mouth/Throat:      Lips: Pink        Mouth: Mucous membranes are dry  Eyes:      General: Vision grossly intact  Gaze aligned appropriately  Conjunctiva/sclera: Conjunctivae normal       Comments: Pupils 5mm and reactive bilaterally   Cardiovascular:      Rate and Rhythm: Regular rhythm  Tachycardia present  Pulmonary:      Effort: Pulmonary effort is normal  No respiratory distress  Musculoskeletal:      Cervical back: Full passive range of motion without pain and neck supple  Skin:     General: Skin is warm and dry  Neurological:      General: No focal deficit present  Mental Status: She is alert and oriented to person, place, and time  Comments: Patient with intermittent, diffuse twitching  Patient slow to respond and is somnolent, but no focal deficits noted  When awoken, patient oriented x3 and responds appropriately, although slowly            ED Medications  Medications   multi-electrolyte (ISOLYTE-S PH 7 4) bolus 1,000 mL (0 mL Intravenous Stopped 11/11/22 0745)   multi-electrolyte (ISOLYTE-S PH 7 4) bolus 1,000 mL (0 mL Intravenous Stopped 11/11/22 1017)       Diagnostic Studies  Results Reviewed     Procedure Component Value Units Date/Time    Magnesium [[id number]]  (Normal) Collected: 11/11/22 0610    Lab Status: Final result Specimen: Blood from Hand, Left Updated: 11/11/22 0805     Magnesium 2 0 mg/dL     Ethanol [[id number]]  (Normal) Collected: 11/11/22 0610    Lab Status: Final result Specimen: Blood from Hand, Left Updated: 11/11/22 0650     Ethanol Lvl <3 mg/dL     Basic metabolic panel [[id number]]  (Abnormal) Collected: 11/11/22 0610    Lab Status: Final result Specimen: Blood from Hand, Left Updated: 11/11/22 0645     Sodium 140 mmol/L      Potassium 3 6 mmol/L      Chloride 105 mmol/L      CO2 21 mmol/L      ANION GAP 14 mmol/L      BUN 33 mg/dL      Creatinine 0 93 mg/dL      Glucose 139 mg/dL      Calcium 7 8 mg/dL      eGFR 68 ml/min/1 73sq m     Narrative:      [place] guidelines for Chronic Kidney Disease (CKD):   •  Stage 1 with normal or high GFR (GFR > 90 mL/min/1 73 square meters)  •  Stage 2 Mild CKD (GFR = 60-89 mL/min/1 73 square meters)  •  Stage 3A Moderate CKD (GFR = 45-59 mL/min/1 73 square meters)  •  Stage 3B Moderate CKD (GFR = 30-44 mL/min/1 73 square meters)  •  Stage 4 Severe CKD (GFR = 15-29 mL/min/1 73 square meters)  •  Stage 5 End Stage CKD (GFR <15 mL/min/1 73 square meters)  Note: GFR calculation is accurate only with a steady state creatinine    Salicylate level [[id number]]  (Abnormal) Collected: 11/11/22 0610    Lab Status: Final result Specimen: Blood from Hand, Left Updated: 13/69/44 6453     Salicylate Lvl <3 mg/dL     Acetaminophen level-If concentration is detectable, please discuss with medical  on call  [[id number]]  (Abnormal) Collected: 11/11/22 0610    Lab Status: Final result Specimen: Blood from Hand, Left Updated: 11/11/22 0645     Acetaminophen Level <2 ug/mL     CBC and differential [[id number]] Collected: 11/11/22 0610    Lab Status: Final result Specimen: Blood from Hand, Left Updated: 11/11/22 0620     WBC 7 96 Thousand/uL      RBC 3 97 Million/uL      Hemoglobin 12 5 g/dL      Hematocrit 37 5 %      MCV 95 fL      MCH 31 5 pg      MCHC 33 3 g/dL      RDW 12 5 %      MPV 11 3 fL      Platelets 366 Thousands/uL      nRBC 0 /100 WBCs      Neutrophils Relative 46 %      Immat GRANS % 2 %      Lymphocytes Relative 39 %      Monocytes Relative 8 %      Eosinophils Relative 4 %      Basophils Relative 1 %      Neutrophils Absolute 3 68 Thousands/µL      Immature Grans Absolute 0 15 Thousand/uL      Lymphocytes Absolute 3 10 Thousands/µL      Monocytes Absolute 0 64 Thousand/µL      Eosinophils Absolute 0 33 Thousand/µL      Basophils Absolute 0 06 Thousands/µL                  XR chest 1 view portable   Final Result by Belen oFrman MD (11/11 0903)      Question mild pulmonary venous congestion                    Workstation performed: [id number] Procedures  ECG 12 Lead Documentation Only    Date/Time: 11/11/2022 6:13 AM  Performed by: Silvano Husain DO  Authorized by: Silvano Husain DO     Indications / Diagnosis:  Took extra dose of seroquel, tachycardia  ECG reviewed by me, the ED Provider: yes    Patient location:  ED  Previous ECG:     Previous ECG:  Unavailable  Interpretation:     Interpretation: abnormal    Rate:     ECG rate:  95    ECG rate assessment: normal    Rhythm:     Rhythm: sinus rhythm    Ectopy:     Ectopy: none    QRS:     QRS axis:  Normal    QRS intervals:  Normal  Conduction:     Conduction: normal    ST segments:     ST segments:  Normal  T waves:     T waves: normal    Other findings:     Other findings: prolonged qTc interval      Other findings comment:  QTc of 550ms          ED Course  ED Course as of 11/11/22 1549   Fri Nov 11, 2022   0731 On re-evaluation, patient still appears intoxicated  Blood pressure is 80s/50s, will give second liter of IV fluids  7347 Patient's BP improved after 2L of fluids  Patient still intoxicated  1217 Patient still somnolent, although easily arousable  Patient still answering questions appropriately when awoken, no focal neurologic deficits  1322 Patient states that she feels lightheaded and unsteady on her feet  Nursing states that patient was unable to walk herself to the bathroom  Patient denies any head injury, focal numbness or weakness, or other symptoms at this time  She states that she believes her symptoms are just residual from the medications she took last night  Patient states that she does not have a ride home at this time, so will keep in the ED for further monitoring     1344 Repeat EKG with normal sinus rhythm at 65 bpm, no ST elevations or depressions, unchanged T waves, Qtc shortened from previous to 409ms                                       MDM  Number of Diagnoses or Management Options  Synthetic cannabinoid intoxication ([facility] [address] ): new and requires workup  Diagnosis management comments: 65year old female presents via EMS after taking an extra 300mg of seroquel and THC tincture last night  Patient had undesireable side effects with this  On exam, patient mildly tachycardic, but otherwise with normal vitals  She is slow to respond, but alert and oriented without focal deficits  EKG obtained which did reveal a QTc of 550ms, no previous to compare to  Will check CBC, BMP, Mg, and coma panel  Will treat with IV fluids  Patient intermittently hypoxic and hypotensive while sleeping  CXR negative for acute cardiopulmonary abnormalities, just questionable mild pulmonary venous congestion  Oxygen saturations improved when patient awake  Patient's labs unremarkable at this time  Repeat EKG after several hours showed improved QT interval  Patient monitored in the ED until clinically sober  At time of discharge, patient asymptomatic, able to walk steadily without assistance, and eating and drinking without difficulty  BP was still low, but with normal MAP, and patient mentating and ambulatory  Patient instructed on medication safety  Patient discharged to home in stable condition with symptomatic care instructions and strict ED return precautions          Amount and/or Complexity of Data Reviewed  Clinical lab tests: reviewed and ordered  Tests in the radiology section of CPT®: reviewed and ordered  Tests in the medicine section of CPT®: reviewed    Patient Progress  Patient progress: improved      Disposition  Final diagnoses:   Synthetic cannabinoid intoxication ([address] )     Time reflects when diagnosis was documented in both MDM as applicable and the Disposition within this note     Time User Action Codes Description Comment    11/11/2022  3:18 PM Enid Muse Add [H09 360] Synthetic cannabinoid intoxication [facility])       ED Disposition     ED Disposition   Discharge    Condition   Stable    Date/Time   Fri Nov 11, 2022  3:18 PM    Comment   Leanne Willoughby discharge to home/self care  Follow-up Information     Follow up With Specialties Details Why Contact Info Additional Information    Marissa A Laveda Paget, PA-C Family Medicine Schedule an appointment as soon as possible for a visit in 1 week As needed [address]  [address] [facility]  [address]  [facility] [address]        [address] [facility] Emergency Department Emergency Medicine Go to  If symptoms worsen [facility] [id number]  [address] [place] [address] Emergency Department, [address] [place] [address], [facility]          Patient's Medications   Discharge Prescriptions    No medications on file     No discharge procedures on file  PDMP Review     None           ED Provider  Attending physically available and evaluated Charlie Billskatlyn Willoughby I managed the patient along with the ED Attending      Electronically Signed by         Rossy Milian DO  11/11/22 9683

## 2022-11-11 NOTE — ED NOTES
ED tech Lindsey Pam coomunicated blood pressure to Resident Dr Fox Boo reported that MAP was WNL and reports she is comfortable with patient discharge        Reena Peters RN  11/11/22 4291

## 2023-03-30 DIAGNOSIS — E03.9 HYPOTHYROIDISM, UNSPECIFIED TYPE: ICD-10-CM

## 2023-03-31 RX ORDER — LEVOTHYROXINE SODIUM 175 UG/1
TABLET ORAL
Qty: 90 TABLET | Refills: 1 | OUTPATIENT
Start: 2023-03-31

## 2023-07-10 DIAGNOSIS — E03.9 HYPOTHYROIDISM, UNSPECIFIED TYPE: ICD-10-CM

## 2023-07-10 DIAGNOSIS — Z98.84 HISTORY OF ROUX-EN-Y GASTRIC BYPASS: ICD-10-CM

## 2023-07-11 RX ORDER — LEVOTHYROXINE SODIUM 175 UG/1
TABLET ORAL
Qty: 30 TABLET | Refills: 0 | Status: SHIPPED | OUTPATIENT
Start: 2023-07-11

## 2023-07-11 RX ORDER — FERROUS SULFATE TAB EC 324 MG (65 MG FE EQUIVALENT) 324 (65 FE) MG
TABLET DELAYED RESPONSE ORAL
Qty: 90 TABLET | Refills: 2 | OUTPATIENT
Start: 2023-07-11

## 2023-11-18 ENCOUNTER — HOSPITAL ENCOUNTER (EMERGENCY)
Facility: HOSPITAL | Age: 58
DRG: 513 | End: 2023-11-19
Attending: EMERGENCY MEDICINE | Admitting: EMERGENCY MEDICINE
Payer: COMMERCIAL

## 2023-11-18 DIAGNOSIS — R45.851 SUICIDAL IDEATIONS: ICD-10-CM

## 2023-11-18 DIAGNOSIS — L08.9 FINGER INFECTION: Primary | ICD-10-CM

## 2023-11-18 PROCEDURE — 90471 IMMUNIZATION ADMIN: CPT

## 2023-11-18 PROCEDURE — 99283 EMERGENCY DEPT VISIT LOW MDM: CPT

## 2023-11-18 RX ORDER — VANCOMYCIN HYDROCHLORIDE 1 G/200ML
15 INJECTION, SOLUTION INTRAVENOUS ONCE
Status: COMPLETED | OUTPATIENT
Start: 2023-11-19 | End: 2023-11-19

## 2023-11-18 RX ORDER — CEFTRIAXONE 2 G/50ML
2000 INJECTION, SOLUTION INTRAVENOUS ONCE
Status: COMPLETED | OUTPATIENT
Start: 2023-11-19 | End: 2023-11-19

## 2023-11-19 ENCOUNTER — ANESTHESIA EVENT (INPATIENT)
Dept: PERIOP | Facility: HOSPITAL | Age: 58
DRG: 513 | End: 2023-11-19
Payer: COMMERCIAL

## 2023-11-19 ENCOUNTER — ANESTHESIA (INPATIENT)
Dept: PERIOP | Facility: HOSPITAL | Age: 58
DRG: 513 | End: 2023-11-19
Payer: COMMERCIAL

## 2023-11-19 ENCOUNTER — APPOINTMENT (EMERGENCY)
Dept: RADIOLOGY | Facility: HOSPITAL | Age: 58
DRG: 513 | End: 2023-11-19
Payer: COMMERCIAL

## 2023-11-19 ENCOUNTER — HOSPITAL ENCOUNTER (INPATIENT)
Facility: HOSPITAL | Age: 58
LOS: 3 days | Discharge: HOME | DRG: 513 | End: 2023-11-22
Attending: FAMILY MEDICINE | Admitting: FAMILY MEDICINE
Payer: COMMERCIAL

## 2023-11-19 VITALS
TEMPERATURE: 98.9 F | BODY MASS INDEX: 26.34 KG/M2 | OXYGEN SATURATION: 94 % | DIASTOLIC BLOOD PRESSURE: 52 MMHG | HEART RATE: 68 BPM | WEIGHT: 139.4 LBS | RESPIRATION RATE: 16 BRPM | SYSTOLIC BLOOD PRESSURE: 91 MMHG

## 2023-11-19 DIAGNOSIS — L08.9 FINGER INFECTION: Primary | ICD-10-CM

## 2023-11-19 DIAGNOSIS — R45.851 SUICIDAL IDEATION: ICD-10-CM

## 2023-11-19 DIAGNOSIS — R21 RASH: ICD-10-CM

## 2023-11-19 DIAGNOSIS — F31.5 BIPOLAR I DISORDER, MOST RECENT EPISODE (OR CURRENT) DEPRESSED, SEVERE, SPECIFIED AS WITH PSYCHOTIC BEHAVIOR (HCC): ICD-10-CM

## 2023-11-19 DIAGNOSIS — E03.9 HYPOTHYROIDISM, UNSPECIFIED TYPE: ICD-10-CM

## 2023-11-19 DIAGNOSIS — E55.9 VITAMIN D DEFICIENCY: ICD-10-CM

## 2023-11-19 PROBLEM — Z86.39 HISTORY OF DIABETES MELLITUS: Status: ACTIVE | Noted: 2019-07-11

## 2023-11-19 LAB
ALBUMIN SERPL BCP-MCNC: 4 G/DL (ref 3.5–5)
ALP SERPL-CCNC: 76 U/L (ref 34–104)
ALT SERPL W P-5'-P-CCNC: 10 U/L (ref 7–52)
ANION GAP SERPL CALCULATED.3IONS-SCNC: 10 MMOL/L
AST SERPL W P-5'-P-CCNC: 17 U/L (ref 13–39)
BASOPHILS # BLD AUTO: 0.04 THOUSANDS/ÂΜL (ref 0–0.1)
BASOPHILS NFR BLD AUTO: 1 % (ref 0–1)
BILIRUB SERPL-MCNC: 0.4 MG/DL (ref 0.2–1)
BUN SERPL-MCNC: 19 MG/DL (ref 5–25)
CALCIUM SERPL-MCNC: 9.1 MG/DL (ref 8.4–10.2)
CHLORIDE SERPL-SCNC: 102 MMOL/L (ref 96–108)
CO2 SERPL-SCNC: 23 MMOL/L (ref 21–32)
CREAT SERPL-MCNC: 0.69 MG/DL (ref 0.6–1.3)
EOSINOPHIL # BLD AUTO: 0.03 THOUSAND/ÂΜL (ref 0–0.61)
EOSINOPHIL NFR BLD AUTO: 0 % (ref 0–6)
ERYTHROCYTE [DISTWIDTH] IN BLOOD BY AUTOMATED COUNT: 12.5 % (ref 11.6–15.1)
GFR SERPL CREATININE-BSD FRML MDRD: 96 ML/MIN/1.73SQ M
GLUCOSE SERPL-MCNC: 104 MG/DL (ref 65–140)
HCT VFR BLD AUTO: 40.2 % (ref 34.8–46.1)
HGB BLD-MCNC: 12.5 G/DL (ref 11.5–15.4)
IMM GRANULOCYTES # BLD AUTO: 0.08 THOUSAND/UL (ref 0–0.2)
IMM GRANULOCYTES NFR BLD AUTO: 1 % (ref 0–2)
LYMPHOCYTES # BLD AUTO: 1.46 THOUSANDS/ÂΜL (ref 0.6–4.47)
LYMPHOCYTES NFR BLD AUTO: 18 % (ref 14–44)
MCH RBC QN AUTO: 29.8 PG (ref 26.8–34.3)
MCHC RBC AUTO-ENTMCNC: 31.1 G/DL (ref 31.4–37.4)
MCV RBC AUTO: 96 FL (ref 82–98)
MONOCYTES # BLD AUTO: 1.09 THOUSAND/ÂΜL (ref 0.17–1.22)
MONOCYTES NFR BLD AUTO: 13 % (ref 4–12)
NEUTROPHILS # BLD AUTO: 5.54 THOUSANDS/ÂΜL (ref 1.85–7.62)
NEUTS SEG NFR BLD AUTO: 67 % (ref 43–75)
NRBC BLD AUTO-RTO: 0 /100 WBCS
PLATELET # BLD AUTO: 218 THOUSANDS/UL (ref 149–390)
PMV BLD AUTO: 10.4 FL (ref 8.9–12.7)
POTASSIUM SERPL-SCNC: 4 MMOL/L (ref 3.5–5.3)
PROT SERPL-MCNC: 7.1 G/DL (ref 6.4–8.4)
RBC # BLD AUTO: 4.19 MILLION/UL (ref 3.81–5.12)
SODIUM SERPL-SCNC: 135 MMOL/L (ref 135–147)
WBC # BLD AUTO: 8.24 THOUSAND/UL (ref 4.31–10.16)

## 2023-11-19 PROCEDURE — 0JBJ0ZZ EXCISION OF RIGHT HAND SUBCUTANEOUS TISSUE AND FASCIA, OPEN APPROACH: ICD-10-PCS | Performed by: ORTHOPAEDIC SURGERY

## 2023-11-19 PROCEDURE — 99222 1ST HOSP IP/OBS MODERATE 55: CPT | Performed by: ORTHOPAEDIC SURGERY

## 2023-11-19 PROCEDURE — 73130 X-RAY EXAM OF HAND: CPT

## 2023-11-19 PROCEDURE — NC001 PR NO CHARGE: Performed by: ORTHOPAEDIC SURGERY

## 2023-11-19 PROCEDURE — 87205 SMEAR GRAM STAIN: CPT | Performed by: ORTHOPAEDIC SURGERY

## 2023-11-19 PROCEDURE — 99285 EMERGENCY DEPT VISIT HI MDM: CPT | Performed by: PHYSICIAN ASSISTANT

## 2023-11-19 PROCEDURE — 96361 HYDRATE IV INFUSION ADD-ON: CPT

## 2023-11-19 PROCEDURE — 96375 TX/PRO/DX INJ NEW DRUG ADDON: CPT

## 2023-11-19 PROCEDURE — 96367 TX/PROPH/DG ADDL SEQ IV INF: CPT

## 2023-11-19 PROCEDURE — 85025 COMPLETE CBC W/AUTO DIFF WBC: CPT | Performed by: PHYSICIAN ASSISTANT

## 2023-11-19 PROCEDURE — 26020 DRAIN HAND TENDON SHEATH: CPT | Performed by: ORTHOPAEDIC SURGERY

## 2023-11-19 PROCEDURE — 80053 COMPREHEN METABOLIC PANEL: CPT | Performed by: PHYSICIAN ASSISTANT

## 2023-11-19 PROCEDURE — 87075 CULTR BACTERIA EXCEPT BLOOD: CPT | Performed by: ORTHOPAEDIC SURGERY

## 2023-11-19 PROCEDURE — 87070 CULTURE OTHR SPECIMN AEROBIC: CPT | Performed by: ORTHOPAEDIC SURGERY

## 2023-11-19 PROCEDURE — 36415 COLL VENOUS BLD VENIPUNCTURE: CPT | Performed by: PHYSICIAN ASSISTANT

## 2023-11-19 PROCEDURE — 90715 TDAP VACCINE 7 YRS/> IM: CPT | Performed by: PHYSICIAN ASSISTANT

## 2023-11-19 PROCEDURE — 96365 THER/PROPH/DIAG IV INF INIT: CPT

## 2023-11-19 RX ORDER — LORAZEPAM 0.5 MG/1
0.5 TABLET ORAL DAILY PRN
Status: DISCONTINUED | OUTPATIENT
Start: 2023-11-19 | End: 2023-11-22 | Stop reason: HOSPADM

## 2023-11-19 RX ORDER — PHENYLEPHRINE HCL IN 0.9% NACL 1 MG/10 ML
SYRINGE (ML) INTRAVENOUS AS NEEDED
Status: DISCONTINUED | OUTPATIENT
Start: 2023-11-19 | End: 2023-11-19

## 2023-11-19 RX ORDER — FENTANYL CITRATE 50 UG/ML
25 INJECTION, SOLUTION INTRAMUSCULAR; INTRAVENOUS
Status: DISCONTINUED | OUTPATIENT
Start: 2023-11-19 | End: 2023-11-19 | Stop reason: HOSPADM

## 2023-11-19 RX ORDER — SODIUM CHLORIDE, SODIUM LACTATE, POTASSIUM CHLORIDE, CALCIUM CHLORIDE 600; 310; 30; 20 MG/100ML; MG/100ML; MG/100ML; MG/100ML
INJECTION, SOLUTION INTRAVENOUS CONTINUOUS PRN
Status: DISCONTINUED | OUTPATIENT
Start: 2023-11-19 | End: 2023-11-19

## 2023-11-19 RX ORDER — POLYETHYLENE GLYCOL 3350 17 G/17G
17 POWDER, FOR SOLUTION ORAL DAILY PRN
Status: DISCONTINUED | OUTPATIENT
Start: 2023-11-19 | End: 2023-11-22 | Stop reason: HOSPADM

## 2023-11-19 RX ORDER — ACETAMINOPHEN 325 MG/1
650 TABLET ORAL EVERY 4 HOURS PRN
Status: DISCONTINUED | OUTPATIENT
Start: 2023-11-19 | End: 2023-11-22 | Stop reason: HOSPADM

## 2023-11-19 RX ORDER — ONDANSETRON 2 MG/ML
4 INJECTION INTRAMUSCULAR; INTRAVENOUS ONCE AS NEEDED
Status: DISCONTINUED | OUTPATIENT
Start: 2023-11-19 | End: 2023-11-19 | Stop reason: HOSPADM

## 2023-11-19 RX ORDER — SODIUM CHLORIDE 9 MG/ML
75 INJECTION, SOLUTION INTRAVENOUS CONTINUOUS
Status: CANCELLED | OUTPATIENT
Start: 2023-11-19

## 2023-11-19 RX ORDER — FENTANYL CITRATE 50 UG/ML
INJECTION, SOLUTION INTRAMUSCULAR; INTRAVENOUS AS NEEDED
Status: DISCONTINUED | OUTPATIENT
Start: 2023-11-19 | End: 2023-11-19

## 2023-11-19 RX ORDER — MAGNESIUM HYDROXIDE 1200 MG/15ML
LIQUID ORAL AS NEEDED
Status: DISCONTINUED | OUTPATIENT
Start: 2023-11-19 | End: 2023-11-19 | Stop reason: HOSPADM

## 2023-11-19 RX ORDER — ENOXAPARIN SODIUM 100 MG/ML
40 INJECTION SUBCUTANEOUS DAILY
Status: DISCONTINUED | OUTPATIENT
Start: 2023-11-20 | End: 2023-11-22 | Stop reason: HOSPADM

## 2023-11-19 RX ORDER — QUETIAPINE FUMARATE 300 MG/1
600 TABLET, FILM COATED ORAL
Status: DISCONTINUED | OUTPATIENT
Start: 2023-11-19 | End: 2023-11-22 | Stop reason: HOSPADM

## 2023-11-19 RX ORDER — FLUOXETINE HYDROCHLORIDE 20 MG/1
40 CAPSULE ORAL 2 TIMES DAILY
Status: DISCONTINUED | OUTPATIENT
Start: 2023-11-19 | End: 2023-11-19

## 2023-11-19 RX ORDER — FLUOXETINE HYDROCHLORIDE 20 MG/1
40 CAPSULE ORAL 2 TIMES DAILY
Status: DISCONTINUED | OUTPATIENT
Start: 2023-11-19 | End: 2023-11-19 | Stop reason: HOSPADM

## 2023-11-19 RX ORDER — SODIUM CHLORIDE, SODIUM LACTATE, POTASSIUM CHLORIDE, CALCIUM CHLORIDE 600; 310; 30; 20 MG/100ML; MG/100ML; MG/100ML; MG/100ML
125 INJECTION, SOLUTION INTRAVENOUS CONTINUOUS
Status: DISPENSED | OUTPATIENT
Start: 2023-11-19 | End: 2023-11-21

## 2023-11-19 RX ORDER — FLUOXETINE HYDROCHLORIDE 20 MG/1
40 CAPSULE ORAL 2 TIMES DAILY
Status: DISCONTINUED | OUTPATIENT
Start: 2023-11-20 | End: 2023-11-22 | Stop reason: HOSPADM

## 2023-11-19 RX ORDER — HYDROMORPHONE HCL/PF 1 MG/ML
0.2 SYRINGE (ML) INJECTION EVERY 2 HOUR PRN
Status: DISCONTINUED | OUTPATIENT
Start: 2023-11-19 | End: 2023-11-22 | Stop reason: HOSPADM

## 2023-11-19 RX ORDER — CEFAZOLIN SODIUM 1 G/3ML
INJECTION, POWDER, FOR SOLUTION INTRAMUSCULAR; INTRAVENOUS AS NEEDED
Status: DISCONTINUED | OUTPATIENT
Start: 2023-11-19 | End: 2023-11-19

## 2023-11-19 RX ORDER — FENTANYL CITRATE/PF 50 MCG/ML
25 SYRINGE (ML) INJECTION
Status: DISCONTINUED | OUTPATIENT
Start: 2023-11-19 | End: 2023-11-19

## 2023-11-19 RX ORDER — QUETIAPINE FUMARATE 100 MG/1
100 TABLET, FILM COATED ORAL DAILY
Status: DISCONTINUED | OUTPATIENT
Start: 2023-11-20 | End: 2023-11-22 | Stop reason: HOSPADM

## 2023-11-19 RX ORDER — QUETIAPINE FUMARATE 100 MG/1
100 TABLET, FILM COATED ORAL DAILY
Status: ON HOLD | COMMUNITY
End: 2023-11-22 | Stop reason: SDUPTHER

## 2023-11-19 RX ORDER — IBUPROFEN 400 MG/1
400 TABLET ORAL EVERY 6 HOURS PRN
Status: DISCONTINUED | OUTPATIENT
Start: 2023-11-19 | End: 2023-11-19

## 2023-11-19 RX ORDER — QUETIAPINE FUMARATE 200 MG/1
600 TABLET, FILM COATED ORAL
Status: DISCONTINUED | OUTPATIENT
Start: 2023-11-19 | End: 2023-11-19

## 2023-11-19 RX ORDER — OXYCODONE HYDROCHLORIDE 5 MG/1
5 TABLET ORAL EVERY 4 HOURS PRN
Status: DISCONTINUED | OUTPATIENT
Start: 2023-11-19 | End: 2023-11-22 | Stop reason: HOSPADM

## 2023-11-19 RX ORDER — METOCLOPRAMIDE HYDROCHLORIDE 5 MG/ML
10 INJECTION INTRAMUSCULAR; INTRAVENOUS ONCE AS NEEDED
Status: DISCONTINUED | OUTPATIENT
Start: 2023-11-19 | End: 2023-11-19

## 2023-11-19 RX ORDER — QUETIAPINE FUMARATE 200 MG/1
600 TABLET, FILM COATED ORAL
Status: DISCONTINUED | OUTPATIENT
Start: 2023-11-19 | End: 2023-11-19 | Stop reason: HOSPADM

## 2023-11-19 RX ORDER — NYSTATIN 100000 [USP'U]/G
POWDER TOPICAL 2 TIMES DAILY
Status: DISCONTINUED | OUTPATIENT
Start: 2023-11-19 | End: 2023-11-22 | Stop reason: HOSPADM

## 2023-11-19 RX ORDER — ALBUTEROL SULFATE 2.5 MG/3ML
2.5 SOLUTION RESPIRATORY (INHALATION) ONCE AS NEEDED
Status: DISCONTINUED | OUTPATIENT
Start: 2023-11-19 | End: 2023-11-19 | Stop reason: HOSPADM

## 2023-11-19 RX ORDER — PROPOFOL 10 MG/ML
INJECTION, EMULSION INTRAVENOUS AS NEEDED
Status: DISCONTINUED | OUTPATIENT
Start: 2023-11-19 | End: 2023-11-19

## 2023-11-19 RX ORDER — KETOROLAC TROMETHAMINE 30 MG/ML
15 INJECTION, SOLUTION INTRAMUSCULAR; INTRAVENOUS ONCE
Status: COMPLETED | OUTPATIENT
Start: 2023-11-19 | End: 2023-11-19

## 2023-11-19 RX ORDER — PROPOFOL 10 MG/ML
INJECTION, EMULSION INTRAVENOUS CONTINUOUS PRN
Status: DISCONTINUED | OUTPATIENT
Start: 2023-11-19 | End: 2023-11-19

## 2023-11-19 RX ORDER — HYDROMORPHONE HCL/PF 1 MG/ML
0.5 SYRINGE (ML) INJECTION
Status: DISCONTINUED | OUTPATIENT
Start: 2023-11-19 | End: 2023-11-19

## 2023-11-19 RX ORDER — QUETIAPINE FUMARATE 100 MG/1
100 TABLET, FILM COATED ORAL DAILY
Status: DISCONTINUED | OUTPATIENT
Start: 2023-11-19 | End: 2023-11-19 | Stop reason: HOSPADM

## 2023-11-19 RX ORDER — ONDANSETRON 2 MG/ML
4 INJECTION INTRAMUSCULAR; INTRAVENOUS ONCE AS NEEDED
Status: DISCONTINUED | OUTPATIENT
Start: 2023-11-19 | End: 2023-11-19

## 2023-11-19 RX ORDER — ONDANSETRON 2 MG/ML
INJECTION INTRAMUSCULAR; INTRAVENOUS AS NEEDED
Status: DISCONTINUED | OUTPATIENT
Start: 2023-11-19 | End: 2023-11-19

## 2023-11-19 RX ORDER — PROMETHAZINE HYDROCHLORIDE 25 MG/ML
6.25 INJECTION, SOLUTION INTRAMUSCULAR; INTRAVENOUS ONCE AS NEEDED
Status: DISCONTINUED | OUTPATIENT
Start: 2023-11-19 | End: 2023-11-19 | Stop reason: HOSPADM

## 2023-11-19 RX ADMIN — FENTANYL CITRATE 25 MCG: 50 INJECTION, SOLUTION INTRAMUSCULAR; INTRAVENOUS at 13:30

## 2023-11-19 RX ADMIN — FENTANYL CITRATE 25 MCG: 50 INJECTION INTRAMUSCULAR; INTRAVENOUS at 14:50

## 2023-11-19 RX ADMIN — OXYCODONE HYDROCHLORIDE 5 MG: 5 TABLET ORAL at 21:46

## 2023-11-19 RX ADMIN — PROPOFOL 100 MCG/KG/MIN: 10 INJECTION, EMULSION INTRAVENOUS at 12:39

## 2023-11-19 RX ADMIN — Medication 100 MCG: at 12:42

## 2023-11-19 RX ADMIN — FLUOXETINE 40 MG: 20 CAPSULE ORAL at 09:22

## 2023-11-19 RX ADMIN — QUETIAPINE FUMARATE 600 MG: 200 TABLET ORAL at 02:41

## 2023-11-19 RX ADMIN — VANCOMYCIN HYDROCHLORIDE 750 MG: 750 INJECTION, SOLUTION INTRAVENOUS at 18:49

## 2023-11-19 RX ADMIN — SODIUM CHLORIDE, SODIUM LACTATE, POTASSIUM CHLORIDE, AND CALCIUM CHLORIDE 125 ML/HR: .6; .31; .03; .02 INJECTION, SOLUTION INTRAVENOUS at 21:46

## 2023-11-19 RX ADMIN — VANCOMYCIN HYDROCHLORIDE 1000 MG: 1 INJECTION, SOLUTION INTRAVENOUS at 01:35

## 2023-11-19 RX ADMIN — CEFAZOLIN 2000 MG: 1 INJECTION, POWDER, FOR SOLUTION INTRAMUSCULAR; INTRAVENOUS at 12:43

## 2023-11-19 RX ADMIN — SODIUM CHLORIDE 1000 ML: 0.9 INJECTION, SOLUTION INTRAVENOUS at 09:41

## 2023-11-19 RX ADMIN — FENTANYL CITRATE 25 MCG: 50 INJECTION INTRAMUSCULAR; INTRAVENOUS at 16:30

## 2023-11-19 RX ADMIN — LORAZEPAM 0.5 MG: 0.5 TABLET ORAL at 20:10

## 2023-11-19 RX ADMIN — CEFTRIAXONE 2000 MG: 2 INJECTION, SOLUTION INTRAVENOUS at 00:26

## 2023-11-19 RX ADMIN — Medication 100 MCG: at 12:52

## 2023-11-19 RX ADMIN — Medication 100 MCG: at 13:30

## 2023-11-19 RX ADMIN — QUETIAPINE FUMARATE 100 MG: 100 TABLET ORAL at 09:22

## 2023-11-19 RX ADMIN — QUETIAPINE FUMARATE 600 MG: 300 TABLET ORAL at 22:42

## 2023-11-19 RX ADMIN — FENTANYL CITRATE 25 MCG: 50 INJECTION INTRAMUSCULAR; INTRAVENOUS at 14:12

## 2023-11-19 RX ADMIN — Medication 200 MCG: at 13:39

## 2023-11-19 RX ADMIN — FENTANYL CITRATE 25 MCG: 50 INJECTION, SOLUTION INTRAMUSCULAR; INTRAVENOUS at 13:56

## 2023-11-19 RX ADMIN — FENTANYL CITRATE 25 MCG: 50 INJECTION, SOLUTION INTRAMUSCULAR; INTRAVENOUS at 12:50

## 2023-11-19 RX ADMIN — NYSTATIN: 100000 POWDER TOPICAL at 18:03

## 2023-11-19 RX ADMIN — KETOROLAC TROMETHAMINE 15 MG: 30 INJECTION, SOLUTION INTRAMUSCULAR; INTRAVENOUS at 00:26

## 2023-11-19 RX ADMIN — SODIUM CHLORIDE, SODIUM LACTATE, POTASSIUM CHLORIDE, AND CALCIUM CHLORIDE: .6; .31; .03; .02 INJECTION, SOLUTION INTRAVENOUS at 12:31

## 2023-11-19 RX ADMIN — TETANUS TOXOID, REDUCED DIPHTHERIA TOXOID AND ACELLULAR PERTUSSIS VACCINE, ADSORBED 0.5 ML: 5; 2.5; 8; 8; 2.5 SUSPENSION INTRAMUSCULAR at 00:26

## 2023-11-19 RX ADMIN — FENTANYL CITRATE 25 MCG: 50 INJECTION, SOLUTION INTRAMUSCULAR; INTRAVENOUS at 13:51

## 2023-11-19 RX ADMIN — PROPOFOL 100 MG: 10 INJECTION, EMULSION INTRAVENOUS at 12:39

## 2023-11-19 RX ADMIN — Medication 100 MCG: at 13:05

## 2023-11-19 RX ADMIN — OXYCODONE HYDROCHLORIDE 5 MG: 5 TABLET ORAL at 17:42

## 2023-11-19 RX ADMIN — FLUOXETINE 40 MG: 20 CAPSULE ORAL at 02:41

## 2023-11-19 RX ADMIN — ONDANSETRON 4 MG: 2 INJECTION INTRAMUSCULAR; INTRAVENOUS at 12:45

## 2023-11-19 NOTE — ASSESSMENT & PLAN NOTE
Patient states she has thyroid medication at home but has not taken it in 1.5-2 weeks.      Thyroid    Lab Results   Component Value Date    IKI7CYONAEKM 24.269 (H) 11/20/2023    KLB4RHKTSSNR 0.661 10/26/2021    LNT6JKYDBMOR 134.000 (H) 08/02/2021    FREET4 0.65 (L) 08/02/2021        Plan:   - Elevated TSH likely result of medication non-adherence  - Continue levothyroxine 175mcg daily for now  - Recheck TSH in 6 weeks outpatient and titrate levothyroxine dose accordingly

## 2023-11-19 NOTE — ASSESSMENT & PLAN NOTE
Patient has red itchy rash over lower abdomen. States this rash is not new. Reports it started after she developed a skin fold after losing weight following bariatric surgery. Area appears red and irritated on exam. Patient has been struggling with frequent infections of this area for years, states often there is puss present and patient endorses she has required treatment for this in the past. Patient frustrated and requesting plastic surgery referral for evaluation after discharge.      Plan:  -nystatin powder ordered, rash improving  -plastic surgery referral outpatient

## 2023-11-19 NOTE — PLAN OF CARE
Problem: PAIN - ADULT  Goal: Verbalizes/displays adequate comfort level or baseline comfort level  Description: Interventions:  - Encourage patient to monitor pain and request assistance  - Assess pain using appropriate pain scale  - Administer analgesics based on type and severity of pain and evaluate response  - Implement non-pharmacological measures as appropriate and evaluate response  - Consider cultural and social influences on pain and pain management  - Notify physician/advanced practitioner if interventions unsuccessful or patient reports new pain  Outcome: Progressing     Problem: INFECTION - ADULT  Goal: Absence or prevention of progression during hospitalization  Description: INTERVENTIONS:  - Assess and monitor for signs and symptoms of infection  - Monitor lab/diagnostic results  - Monitor all insertion sites, i.e. indwelling lines, tubes, and drains  - Monitor endotracheal if appropriate and nasal secretions for changes in amount and color  - [place] appropriate cooling/warming therapies per order  - Administer medications as ordered  - Instruct and encourage patient and family to use good hand hygiene technique  - Identify and instruct in appropriate isolation precautions for identified infection/condition  Outcome: Progressing  Goal: Absence of fever/infection during neutropenic period  Description: INTERVENTIONS:  - Monitor WBC    Outcome: Progressing     Problem: SAFETY ADULT  Goal: Patient will remain free of falls  Description: INTERVENTIONS:  - Educate patient/family on patient safety including physical limitations  - Instruct patient to call for assistance with activity   - Consult OT/PT to assist with strengthening/mobility   - Keep Call bell within reach  - Keep bed low and locked with side rails adjusted as appropriate  - Keep care items and personal belongings within reach  - Initiate and maintain comfort rounds  - Make Fall Risk Sign visible to staff  - Offer Toileting every  Hours, in advance of need  - Initiate/Maintain alarm  - Obtain necessary fall risk management equipment:   - Apply yellow socks and bracelet for high fall risk patients  - Consider moving patient to room near nurses station  Outcome: Progressing  Goal: Maintain or return to baseline ADL function  Description: INTERVENTIONS:  -  Assess patient's ability to carry out ADLs; assess patient's baseline for ADL function and identify physical deficits which impact ability to perform ADLs (bathing, care of mouth/teeth, toileting, grooming, dressing, etc.)  - Assess/evaluate cause of self-care deficits   - Assess range of motion  - Assess patient's mobility; develop plan if impaired  - Assess patient's need for assistive devices and provide as appropriate  - Encourage maximum independence but intervene and supervise when necessary  - Involve family in performance of ADLs  - Assess for home care needs following discharge   - Consider OT consult to assist with ADL evaluation and planning for discharge  - Provide patient education as appropriate  Outcome: Progressing  Goal: Maintains/Returns to pre admission functional level  Description: INTERVENTIONS:  - Perform AM-PAC 6 Click Basic Mobility/ Daily Activity assessment daily.  - Set and communicate daily mobility goal to care team and patient/family/caregiver. - Collaborate with rehabilitation services on mobility goals if consulted  - Perform Range of Motion  times a day. - Reposition patient every  hours.   - Dangle patient  times a day  - Stand patient  times a day  - Ambulate patient  times a day  - Out of bed to chair  times a day   - Out of bed for meals times a day  - Out of bed for toileting  - Record patient progress and toleration of activity level   Outcome: Progressing     Problem: DISCHARGE PLANNING  Goal: Discharge to home or other facility with appropriate resources  Description: INTERVENTIONS:  - Identify barriers to discharge w/patient and caregiver  - Arrange for needed discharge resources and transportation as appropriate  - Identify discharge learning needs (meds, wound care, etc.)  - Arrange for interpretive services to assist at discharge as needed  - Refer to Case Management Department for coordinating discharge planning if the patient needs post-hospital services based on physician/advanced practitioner order or complex needs related to functional status, cognitive ability, or social support system  Outcome: Progressing     Problem: Knowledge Deficit  Goal: Patient/family/caregiver demonstrates understanding of disease process, treatment plan, medications, and discharge instructions  Description: Complete learning assessment and assess knowledge base.   Interventions:  - Provide teaching at level of understanding  - Provide teaching via preferred learning methods  Outcome: Progressing

## 2023-11-19 NOTE — ASSESSMENT & PLAN NOTE
Performed in 2015 at [facility] with Dr. Gil Tuttle   Avoid NSAIDs  B-vitamin levels last checked in 2021  Vitamin D level low at 13    Plan:  Recheck B vitamin levels  We will consider resuming home supplements if levels low  Vitamin D repletion

## 2023-11-19 NOTE — EMTALA/ACUTE CARE TRANSFER
[address] [place] [id number]  Dept: [phone]      EMTALA TRANSFER CONSENT    NAME Ai Virk Fahallie                                         1965                              MRN [id number]    I have been informed of my rights regarding examination, treatment, and transfer   by Dr. Paco Navarrete: Specialized equipment and/or services available at the receiving facility (Include comment)________________________ (Hand Surgery)    Risks: Potential for delay in receiving treatment, Increased discomfort during transfer, Possible worsening of condition or death during transfer, Potential deterioration of medical condition, Loss of IV      Consent for Transfer:  I acknowledge that my medical condition has been evaluated and explained to me by the emergency department physician or other qualified medical person and/or my attending physician, who has recommended that I be transferred to the service of  Accepting Physician: Dr. Fany Bell at [address] [place] [address] [address] Name, [facility] : [place], [place]. The above potential benefits of such transfer, the potential risks associated with such transfer, and the probable risks of not being transferred have been explained to me, and I fully understand them. The doctor has explained that, in my case, the benefits of transfer outweigh the risks. I agree to be transferred. I authorize the performance of emergency medical procedures and treatments upon me in both transit and upon arrival at the receiving facility. Additionally, I authorize the release of any and all medical records to the receiving facility and request they be transported with me, if possible. I understand that the safest mode of transportation during a medical emergency is an ambulance and that the Hospital advocates the use of this mode of transport.  Risks of traveling to the receiving facility by car, including absence of medical control, life sustaining equipment, such as oxygen, and medical personnel has been explained to me and I fully understand them. (EVELYNE CORRECT BOX BELOW)  [  ]  I consent to the stated transfer and to be transported by ambulance/helicopter. [  ]  I consent to the stated transfer, but refuse transportation by ambulance and accept full responsibility for my transportation by car. I understand the risks of non-ambulance transfers and I exonerate the Hospital and its staff from any deterioration in my condition that results from this refusal.    X___________________________________________    DATE  23  TIME________  Signature of patient or legally responsible individual signing on patient behalf           RELATIONSHIP TO PATIENT_________________________          Provider Certification    NAME Stefania Willoughby                                         1965                              MRN [id number]    A medical screening exam was performed on the above named patient. Based on the examination:    Condition Necessitating Transfer The primary encounter diagnosis was Finger infection. A diagnosis of Suicidal ideations was also pertinent to this visit.     Patient Condition: The patient has been stabilized such that within reasonable medical probability, no material deterioration of the patient condition or the condition of the unborn child(chip) is likely to result from the transfer    Reason for Transfer: Level of Care needed not available at this facility    Transfer Requirements: [address] [place] [address], [facility] [address] available and qualified personnel available for treatment as acknowledged by [facility]  Agreed to accept transfer and to provide appropriate medical treatment as acknowledged by       Dr. Kait Whyte  Appropriate medical records of the examination and treatment of the patient are provided at the time of transfer   [facility] _______  Transfer will be performed by qualified personnel from    and appropriate transfer equipment as required, including the use of necessary and appropriate life support measures. Provider Certification: I have examined the patient and explained the following risks and benefits of being transferred/refusing transfer to the patient/family:  General risk, such as traffic hazards, adverse weather conditions, rough terrain or turbulence, possible failure of equipment (including vehicle or aircraft), or consequences of actions of persons outside the control of the transport personnel, Unanticipated needs of medical equipment and personnel during transport, Risk of worsening condition, The possibility of a transport vehicle being unavailable      Based on these reasonable risks and benefits to the patient and/or the unborn child(hcip), and based upon the information available at the time of the patient’s examination, I certify that the medical benefits reasonably to be expected from the provision of appropriate medical treatments at another medical facility outweigh the increasing risks, if any, to the individual’s medical condition, and in the case of labor to the unborn child, from effecting the transfer.     X____________________________________________ DATE 11/19/23        TIME_______      ORIGINAL - SEND TO MEDICAL RECORDS   COPY - SEND WITH PATIENT DURING TRANSFER

## 2023-11-19 NOTE — ASSESSMENT & PLAN NOTE
Patient had been bitten by her pet rat 4 days prior to presentation. Since then, she developed, swelling, pain and drainage from the Left 2nd finger.    ED management:  - Vitals remain stable and CBC, CMP are WNL  - Patient was given the Tdap vaccine and started on Vancomycin and Ceftriaxone  - Discussed with Hand surgeon Dr. Kodak Mcbride, recommend transfer for OR washout    Plan:  POD 2 from washout by ortho  Wound check today by ortho  Cleared to start wound soaks by Ortho  Wound cultures negative so far,   ID following, per ID wound cultures will be held for extended incubation  Continue Vancomycin and Ceftriaxone  Pain regimen

## 2023-11-19 NOTE — H&P (VIEW-ONLY)
Orthopedics   Wilma Krzysztof Case 63 y.o. female MRN: [id number]  Unit/Bed#: HOLDING (IP) 4      Chief Complaint:   right index finger pain    HPI:   63 y. o.female  right hand dominant complaining of right index finger erythema and pain with drainage. 4 days ago she was bitten by her pet rat. She washed it however she gradually developed swelling, pain, and drainage. She was transferred here from [facility] for higher level of care. Pain is sharp and located volarly over her bite location. She denies numbness or paresthesias. PMH significant for bipolar disorder and depression, hypothyroidism. She does not take any blood thinners. Denies previous hand or finger infections. She on disability for her mental health conditions.      Review Of Systems:   Skin: See HPI  Neuro: See HPI  Musculoskeletal: See HPI  14 point review of systems negative except as stated above     Past Medical History:   Past Medical History:   Diagnosis Date    Ankle fracture     Attention deficit disorder 11/1/2011    Bipolar 1 disorder ([address] )     Depression     Depression 7/19/2021    Disease of thyroid gland     Foot fracture, left     Pneumonia 12/2008    Psychiatric disorder     Renal disorder     Tachycardia 12/2008       Past Surgical History:   Past Surgical History:   Procedure Laterality Date    GASTRIC BYPASS  01/25/2016    ALIS-EN-Y PROCEDURE         Family History:  Family history reviewed and non-contributory  Family History   Adopted: Yes       Social History:  Social History     Socioeconomic History    Marital status: Single     Spouse name: Not on file    Number of children: Not on file    Years of education: Not on file    Highest education level: Not on file   Occupational History    Not on file   Tobacco Use    Smoking status: Never    Smokeless tobacco: Never   Substance and Sexual Activity    Alcohol use: Never    Drug use: Never    Sexual activity: Not on file   Other Topics Concern    Not on file   Social History Narrative ** Merged History Encounter **          Social Determinants of Health     Financial Resource Strain: High Risk (11/8/2021)    Overall Financial Resource Strain (CARDIA)     Difficulty of Paying Living Expenses: Hard   Food Insecurity: No Food Insecurity (11/8/2021)    Hunger Vital Sign     Worried About Running Out of Food in the Last Year: Never true     Ran Out of Food in the Last Year: Never true   Transportation Needs: No Transportation Needs (11/8/2021)    PRAPARE - Transportation     Lack of Transportation (Medical): No     Lack of Transportation (Non-Medical): No   Physical Activity: Not on file   Stress: Not on file   Social Connections: Not on file   Intimate Partner Violence: Not on file   Housing Stability: Not on file       Allergies:   No Known Allergies        Labs:  0   Lab Value Date/Time    HCT 40.2 11/19/2023 0022    HCT 37.5 11/11/2022 0610    HCT 38.2 08/02/2021 1642    HGB 12.5 11/19/2023 0022    HGB 12.5 11/11/2022 0610    HGB 12.6 08/02/2021 1642    WBC 8.24 11/19/2023 0022    WBC 7.96 11/11/2022 0610    WBC 4.10 (L) 08/02/2021 1642       Meds:  No current facility-administered medications for this encounter. Blood Culture:   No results found for: "BLOODCX"    Wound Culture:   No results found for: "WOUNDCULT"    Ins and Outs:  No intake/output data recorded. Physical Exam:   There were no vitals taken for this visit.   Gen: No acute distress, resting comfortably in bed  HEENT: Eyes clear, moist mucus membranes, hearing intact  Respiratory: No audible wheezing or stridor  Cardiovascular: Well Perfused peripherally, 2+ distal pulse  Abdomen: nondistended, no peritoneal signs  Musculoskeletal: right Hand  Skin: Erythema and swelling over volar aspect of index finger over proximal phalanx  Puncture mary with active purulent drainage  AROM MCP 15-45, PIP from 10-45, and DIP from 0-90  Finger held in neutral  Swelling localized to MCP, proximal phalanx and PIP joint, not fusiform in nature  Tender over area of swelling surrounding puncture mary, not to rest of flexor tendon  Mild pain with passive extension  Sensation intact light touch and two point discrimination intact  5/5 motor to Radial, Ulna, and Median  2+ Radial & Ulnar Pulses  Digits warm and well perfused    Radiology:   I personally reviewed and uinterpreted the films. X-rays AP/Lateral views of right hand show no fractures or dislocations no signs of osteomyelitis.     _*_*_*_*_*_*_*_*_*_*_*_*_*_*_*_*_*_*_*_*_*_*_*_*_*_*_*_*_*_*_*_*_*_*_*_*_*_*_*_*_*    Assessment:  63 y. o.female with  right index finger abscess, concern for possible flexor tenosynovitis. .    Plan: To OR urgently for right index finger irrigation and debridement  Cont.  abx per primary team  Nonweight bearing to right upper ext  Analgesics for pain    Dispo: Ortho will follow      Pieter Romero MD

## 2023-11-19 NOTE — ED PROVIDER NOTES
History  Chief Complaint   Patient presents with    Laceration     Pt bit by pet rat four days ago in the right hand. Swelling and brusing and laceration to the fourth digit on the right hand. Pt states finger was bleeding and stopped but swelling got worse. Psychiatric Evaluation     Pt states she is out of Prozac and Seroquel for the past four days. Pt stated "today I was feeling suicidal, if I had my Seroquel I would taken enough to end it all."      This is a 20-year-old female with past medical history of bipolar disorder and depression presenting to ED for evaluation of right second digit injury. Patient states approximately 4 days ago she was bit on the right hand by her pet rat. Patient states that she has had worsening swelling, redness, pain to the area. She endorses warmth to the area. She notes that the erythema is spreading to the palm of the right hand. Patient endorses area of abscess and purulent drainage from the right second digit. She denies any systemic symptoms. Patient also states that she was experiencing a lot of sadness today. Patient states that she was crying often. She states that she had fleeting thoughts of suicide. Patient states that if she had her Seroquel she would have taken it all to harm herself. She states that she has been without her Seroquel and Prozac for the last 4 days. Patient states that when she is on her medications regularly she does not feel this way. History provided by:  Patient and EMS personnel   used: No        Prior to Admission Medications   Prescriptions Last Dose Informant Patient Reported? Taking?    B Complex Vitamins (Vitamin B Complex) TABS   No No   Sig: Take 2 tablets by mouth daily   Calcium Citrate-Vitamin D (CALCET CREAMY BITES) 500-400 MG-UNIT CHEW  Self Yes No   Si to 3 daily   Cyanocobalamin (B-12) 5000 MCG CAPS  Self Yes No   Sig: every 24 hours   FLUoxetine (PROzac) 40 MG capsule  Self Yes No Si (two) times a day   LORazepam (ATIVAN) 0.5 mg tablet  Self Yes No   Multiple Vitamin (Multivitamin Adult) TABS   No No   Sig: Take 2 tablets by mouth daily   QUEtiapine (SEROquel) 100 mg tablet   Yes Yes   Sig: Take 100 mg by mouth daily   QUEtiapine (SEROquel) 300 mg tablet  Self Yes No   Si mg 2 (two) times a day   cetirizine (ZyrTEC) 10 mg tablet   No No   Sig: Take 1 tablet (10 mg total) by mouth daily   ergocalciferol (VITAMIN D2) 50,000 units   No No   Sig: Take 1 capsule (50,000 Units total) by mouth once a week   ferrous sulfate 324 (65 Fe) mg   No No   Sig: Take 1 tablet (324 mg total) by mouth 3 (three) times a day before meals   levothyroxine 175 mcg tablet   No No   Sig: take 1 tablet by mouth once daily      Facility-Administered Medications: None       Past Medical History:   Diagnosis Date    Ankle fracture     Bipolar 1 disorder ([address])     Depression     Depression 2021    Disease of thyroid gland     Foot fracture, left     Pneumonia 2008    Psychiatric disorder     Renal disorder     Tachycardia 2008       Past Surgical History:   Procedure Laterality Date    GASTRIC BYPASS  2016    ALIS-EN-Y PROCEDURE         Family History   Adopted: Yes     I have reviewed and agree with the history as documented. E-Cigarette/Vaping     E-Cigarette/Vaping Substances     Social History     Tobacco Use    Smoking status: Never    Smokeless tobacco: Never   Substance Use Topics    Alcohol use: Never    Drug use: Never       Review of Systems   Musculoskeletal:  Positive for arthralgias and joint swelling. Psychiatric/Behavioral:  Positive for suicidal ideas. All other systems reviewed and are negative. Physical Exam  Physical Exam  Vitals reviewed. Constitutional:       General: She is not in acute distress. Appearance: Normal appearance. She is well-developed and well-groomed. She is not ill-appearing. HENT:      Head: Normocephalic and atraumatic.       Right Ear: External ear normal.      Left Ear: External ear normal.      Nose: Nose normal.   Eyes:      General: No scleral icterus. Conjunctiva/sclera: Conjunctivae normal.   Cardiovascular:      Rate and Rhythm: Normal rate and regular rhythm. Pulmonary:      Effort: Pulmonary effort is normal.      Breath sounds: No stridor. Abdominal:      General: There is no distension. Musculoskeletal:         General: No deformity. Right hand: Swelling and tenderness present. Decreased range of motion. Normal sensation. Normal capillary refill. Normal pulse. Cervical back: Normal range of motion. Comments: Diffuse swelling the R 2nd digit, erythema and warmth to the area. Fluctuance and purulent drainage noted to the proximal phalanx. She has streaking into the R palm. She has decreased ROM to the 2nd digit, sensation intact. Cap refill brisk. See photos below. Skin:     Coloration: Skin is not jaundiced or pale. Findings: No lesion or rash. Neurological:      Mental Status: She is alert and oriented to person, place, and time. Psychiatric:         Mood and Affect: Mood normal.         Behavior: Behavior normal. Behavior is cooperative.                        Vital Signs  ED Triage Vitals   Temperature Pulse Respirations Blood Pressure SpO2   11/18/23 2335 11/19/23 0026 11/19/23 0030 11/19/23 0026 11/19/23 0026   98.9 °F (37.2 °C) 80 16 105/66 96 %      Temp Source Heart Rate Source Patient Position - Orthostatic VS BP Location FiO2 (%)   11/18/23 2335 11/19/23 0130 11/19/23 0130 -- --   Oral Monitor Lying        Pain Score       11/19/23 0026       9           Vitals:    11/19/23 0200 11/19/23 0300 11/19/23 0500 11/19/23 0605   BP: 97/64 97/59 101/58 95/51   Pulse: 60 71 77 85   Patient Position - Orthostatic VS: Lying Lying Lying Lying         Visual Acuity      ED Medications  Medications   QUEtiapine (SEROquel) tablet 100 mg (has no administration in time range)   FLUoxetine (PROzac) capsule 40 mg (40 mg Oral Given 11/19/23 0241)   QUEtiapine (SEROquel) tablet 600 mg (600 mg Oral Given 11/19/23 0241)   tetanus-diphtheria-acellular pertussis (BOOSTRIX) IM injection 0.5 mL (0.5 mL Intramuscular Given 11/19/23 0026)   vancomycin (VANCOCIN) IVPB (premix in dextrose) 1,000 mg 200 mL (0 mg Intravenous Stopped 11/19/23 0255)   cefTRIAXone (ROCEPHIN) IVPB (premix in dextrose) 2,000 mg 50 mL (0 mg Intravenous Stopped 11/19/23 0116)   ketorolac (TORADOL) injection 15 mg (15 mg Intravenous Given 11/19/23 0026)       Diagnostic Studies  Results Reviewed       Procedure Component Value Units Date/Time    Comprehensive metabolic panel [[id number]] Collected: 11/19/23 0022    Lab Status: Final result Specimen: Blood from Line, Venous Updated: 11/19/23 0047     Sodium 135 mmol/L      Potassium 4.0 mmol/L      Chloride 102 mmol/L      CO2 23 mmol/L      ANION GAP 10 mmol/L      BUN 19 mg/dL      Creatinine 0.69 mg/dL      Glucose 104 mg/dL      Calcium 9.1 mg/dL      AST 17 U/L      ALT 10 U/L      Alkaline Phosphatase 76 U/L      Total Protein 7.1 g/dL      Albumin 4.0 g/dL      Total Bilirubin 0.40 mg/dL      eGFR 96 ml/min/1.73sq m     Narrative:      [facility] guidelines for Chronic Kidney Disease (CKD):     Stage 1 with normal or high GFR (GFR > 90 mL/min/1.73 square meters)    Stage 2 Mild CKD (GFR = 60-89 mL/min/1.73 square meters)    Stage 3A Moderate CKD (GFR = 45-59 mL/min/1.73 square meters)    Stage 3B Moderate CKD (GFR = 30-44 mL/min/1.73 square meters)    Stage 4 Severe CKD (GFR = 15-29 mL/min/1.73 square meters)    Stage 5 End Stage CKD (GFR <15 mL/min/1.73 square meters)  Note: GFR calculation is accurate only with a steady state creatinine    CBC and differential [[id number]]  (Abnormal) Collected: 11/19/23 0022    Lab Status: Final result Specimen: Blood from Line, Venous Updated: 11/19/23 0032     WBC 8.24 Thousand/uL      RBC 4.19 Million/uL      Hemoglobin 12.5 g/dL Hematocrit 40.2 %      MCV 96 fL      MCH 29.8 pg      MCHC 31.1 g/dL      RDW 12.5 %      MPV 10.4 fL      Platelets 947 Thousands/uL      nRBC 0 /100 WBCs      Neutrophils Relative 67 %      Immat GRANS % 1 %      Lymphocytes Relative 18 %      Monocytes Relative 13 %      Eosinophils Relative 0 %      Basophils Relative 1 %      Neutrophils Absolute 5.54 Thousands/µL      Immature Grans Absolute 0.08 Thousand/uL      Lymphocytes Absolute 1.46 Thousands/µL      Monocytes Absolute 1.09 Thousand/µL      Eosinophils Absolute 0.03 Thousand/µL      Basophils Absolute 0.04 Thousands/µL                    XR hand 3+ vw right    (Results Pending)              Procedures  Procedures         ED Course  ED Course as of 11/19/23 0613   Sun Nov 19, 2023   0030 TT to Dr. Tonia Mendes w/ hand surgery. Will transfer patient to [facility] for washout. Admit to medicine for IV abx.   8960 Spoke w/ Dr. Kacie Merlos w/ family medicine at [facility]. PACs states: if no bed available prior to transfer and if OR scheduled. Can transfer straight to OR in AM. No beds available currently at [facility]. SBIRT 20yo+      Flowsheet Row Most Recent Value   Initial Alcohol Screen: US AUDIT-C     1. How often do you have a drink containing alcohol? 0 Filed at: 11/19/2023 0048   2. How many drinks containing alcohol do you have on a typical day you are drinking? 0 Filed at: 11/19/2023 0048   3b. FEMALE Any Age, or MALE 65+: How often do you have 4 or more drinks on one occassion? 0 Filed at: 11/19/2023 0048   Audit-C Score 0 Filed at: 11/19/2023 2054   ALEKSANDRA: How many times in the past year have you. .. Used an illegal drug or used a prescription medication for non-medical reasons? Never Filed at: 11/19/2023 0048                      Medical Decision Making      DDx including but not limited to: cellulitis, abscess, tenosynovitis, lymphangitis, osteomyelitis, septic arthritis.       Patient presenting to the ED for evaluation of infection to the right second digit. Will order CBC for evaluation of leukocytosis and a CMP for evaluation of kidney function, electrolyte abnormalities and LFTs. Discussed with Dr. Keyona Odonnell with hand surgery, recommends patient be transferred to [facility] for washout. Patient also having suicidal thoughts throughout the day today. Patient states her plan would have been to take her Seroquel, patient is currently out of Seroquel. Patient states that she has been out of her Prozac and Seroquel for the past 4 days. Patient believes that if she were currently on her normal regimen of medication she would not be feeling this way. Patient will need a psychiatric consult after medical clearance. Do not believe that patient is currently a risk to herself as she does not have access to these pills to overdose on. Patient can be monitored with every 15 minute checks. Patient is in a room that is visible to staff. Will order patient nighttime medications. Discussed with family medicine resident at [facility] for admission of patient. There are currently no beds at [facility], patient may need to be transferred to the OR in the a.m. S/o given to [employer]. Pt pending transport to [facility] for hand surgery wash out. Patient NPO. Patient currently sleeping, no acute distress. Problems Addressed:  Finger infection: acute illness or injury  Suicidal ideations: acute illness or injury    Amount and/or Complexity of Data Reviewed  Labs: ordered. Risk  Prescription drug management.              Disposition  Final diagnoses:   Finger infection   Suicidal ideations     Time reflects when diagnosis was documented in both MDM as applicable and the Disposition within this note       Time User Action Codes Description Comment    11/19/2023 12:14 AM Lay Solomon Add [L08.9] Finger infection     11/19/2023 12:16 AM Lay Solomon Add [R45.851] Suicidal ideations           ED Disposition       ED Disposition   Transfer to Another Facility-In Network    Condition   --    Date/Time   Jeff Betancur Nov 19, 2023 0014    Comment   Johanna Baez Case should be transferred out to [facility].                MD Documentation      Faizan Nava Most Recent Value   Patient Condition The patient has been stabilized such that within reasonable medical probability, no material deterioration of the patient condition or the condition of the unborn child(chip) is likely to result from the transfer   Reason for Transfer Level of Care needed not available at this facility   Benefits of Transfer Specialized equipment and/or services available at the receiving facility (Include comment)________________________  [facility]]   Risks of Transfer Potential for delay in receiving treatment, Increased discomfort during transfer, Possible worsening of condition or death during transfer, Potential deterioration of medical condition, Loss of IV   Accepting Physician Dr. Allison Bernstein Name, [address] [place] [address]    (Name & Tel number) Kierra Snow   Provider Certification General risk, such as traffic hazards, adverse weather conditions, rough terrain or turbulence, possible failure of equipment (including vehicle or aircraft), or consequences of actions of persons outside the control of the transport personnel, Unanticipated needs of medical equipment and personnel during transport, Risk of worsening condition, The possibility of a transport vehicle being unavailable          RN Documentation      Flowsheet Row Most [facility] Name, [facility] [address]    (Name & Tel number) Nayely Leung          Follow-up Information    None         Current Discharge Medication List        CONTINUE these medications which have NOT CHANGED    Details   !! QUEtiapine (SEROquel) 100 mg tablet Take 100 mg by mouth daily      B Complex Vitamins (Vitamin B Complex) TABS Take 2 tablets by mouth daily  Qty: 180 tablet, Refills: 1    Associated Diagnoses: History of Arnav-en-Y gastric bypass      Calcium Citrate-Vitamin D (CALCET CREAMY BITES) 500-400 MG-UNIT CHEW 2 to 3 daily      cetirizine (ZyrTEC) 10 mg tablet Take 1 tablet (10 mg total) by mouth daily  Qty: 90 tablet, Refills: 1    Associated Diagnoses: Allergic rhinitis, unspecified seasonality, unspecified trigger      Cyanocobalamin (B-12) 5000 MCG CAPS every 24 hours      ergocalciferol (VITAMIN D2) 50,000 units Take 1 capsule (50,000 Units total) by mouth once a week  Qty: 12 capsule, Refills: 1    Associated Diagnoses: Vitamin D deficiency      ferrous sulfate 324 (65 Fe) mg Take 1 tablet (324 mg total) by mouth 3 (three) times a day before meals  Qty: 90 tablet, Refills: 2    Associated Diagnoses: History of Arnav-en-Y gastric bypass      FLUoxetine (PROzac) 40 MG capsule 2 (two) times a day      levothyroxine 175 mcg tablet take 1 tablet by mouth once daily  Qty: 30 tablet, Refills: 0    Associated Diagnoses: Hypothyroidism, unspecified type      LORazepam (ATIVAN) 0.5 mg tablet Refills: 0      Multiple Vitamin (Multivitamin Adult) TABS Take 2 tablets by mouth daily  Qty: 180 tablet, Refills: 1    Associated Diagnoses: History of Arnav-en-Y gastric bypass      !! QUEtiapine (SEROquel) 300 mg tablet 300 mg 2 (two) times a day       !! - Potential duplicate medications found. Please discuss with provider. No discharge procedures on file.     PDMP Review         Value Time User    PDMP Reviewed  Yes 11/19/2023  1:55 AM Joshua Ortez PA-C            ED Provider  Electronically Signed by [facility]  11/19/23 6674

## 2023-11-19 NOTE — CONSULTS
Orthopedics   Ayo Willoughby 63 y.o. female MRN: [id number]  Unit/Bed#: HOLDING (IP) 4      Chief Complaint:   right index finger pain    HPI:   63 y. o.female  right hand dominant complaining of right index finger erythema and pain with drainage. 4 days ago she was bitten by her pet rat. She washed it however she gradually developed swelling, pain, and drainage. She was transferred here from [facility] for higher level of care. Pain is sharp and located volarly over her bite location. She denies numbness or paresthesias. PMH significant for bipolar disorder and depression, hypothyroidism. She does not take any blood thinners. Denies previous hand or finger infections. She on disability for her mental health conditions.      Review Of Systems:   Skin: See HPI  Neuro: See HPI  Musculoskeletal: See HPI  14 point review of systems negative except as stated above     Past Medical History:   Past Medical History:   Diagnosis Date    Ankle fracture     Attention deficit disorder 11/1/2011    Bipolar 1 disorder ([address])     Depression     Depression 7/19/2021    Disease of thyroid gland     Foot fracture, left     Pneumonia 12/2008    Psychiatric disorder     Renal disorder     Tachycardia 12/2008       Past Surgical History:   Past Surgical History:   Procedure Laterality Date    GASTRIC BYPASS  01/25/2016    ALIS-EN-Y PROCEDURE         Family History:  Family history reviewed and non-contributory  Family History   Adopted: Yes       Social History:  Social History     Socioeconomic History    Marital status: Single     Spouse name: Not on file    Number of children: Not on file    Years of education: Not on file    Highest education level: Not on file   Occupational History    Not on file   Tobacco Use    Smoking status: Never    Smokeless tobacco: Never   Substance and Sexual Activity    Alcohol use: Never    Drug use: Never    Sexual activity: Not on file   Other Topics Concern    Not on file   Social History Narrative ** Merged History Encounter **          Social Determinants of Health     Financial Resource Strain: High Risk (11/8/2021)    Overall Financial Resource Strain (CARDIA)     Difficulty of Paying Living Expenses: Hard   Food Insecurity: No Food Insecurity (11/8/2021)    Hunger Vital Sign     Worried About Running Out of Food in the Last Year: Never true     Ran Out of Food in the Last Year: Never true   Transportation Needs: No Transportation Needs (11/8/2021)    PRAPARE - Transportation     Lack of Transportation (Medical): No     Lack of Transportation (Non-Medical): No   Physical Activity: Not on file   Stress: Not on file   Social Connections: Not on file   Intimate Partner Violence: Not on file   Housing Stability: Not on file       Allergies:   No Known Allergies        Labs:  0   Lab Value Date/Time    HCT 40.2 11/19/2023 0022    HCT 37.5 11/11/2022 0610    HCT 38.2 08/02/2021 1642    HGB 12.5 11/19/2023 0022    HGB 12.5 11/11/2022 0610    HGB 12.6 08/02/2021 1642    WBC 8.24 11/19/2023 0022    WBC 7.96 11/11/2022 0610    WBC 4.10 (L) 08/02/2021 1642       Meds:  No current facility-administered medications for this encounter. Blood Culture:   No results found for: "BLOODCX"    Wound Culture:   No results found for: "WOUNDCULT"    Ins and Outs:  No intake/output data recorded. Physical Exam:   There were no vitals taken for this visit.   Gen: No acute distress, resting comfortably in bed  HEENT: Eyes clear, moist mucus membranes, hearing intact  Respiratory: No audible wheezing or stridor  Cardiovascular: Well Perfused peripherally, 2+ distal pulse  Abdomen: nondistended, no peritoneal signs  Musculoskeletal: right Hand  Skin: Erythema and swelling over volar aspect of index finger over proximal phalanx  Puncture mary with active purulent drainage  AROM MCP 15-45, PIP from 10-45, and DIP from 0-90  Finger held in neutral  Swelling localized to MCP, proximal phalanx and PIP joint, not fusiform in nature  Tender over area of swelling surrounding puncture mary, not to rest of flexor tendon  Mild pain with passive extension  Sensation intact light touch and two point discrimination intact  5/5 motor to Radial, Ulna, and Median  2+ Radial & Ulnar Pulses  Digits warm and well perfused    Radiology:   I personally reviewed and uinterpreted the films. X-rays AP/Lateral views of right hand show no fractures or dislocations no signs of osteomyelitis.     _*_*_*_*_*_*_*_*_*_*_*_*_*_*_*_*_*_*_*_*_*_*_*_*_*_*_*_*_*_*_*_*_*_*_*_*_*_*_*_*_*    Assessment:  63 y. o.female with  right index finger abscess, concern for possible flexor tenosynovitis. .    Plan: To OR urgently for right index finger irrigation and debridement  Cont.  abx per primary team  Nonweight bearing to right upper ext  Analgesics for pain    Dispo: Ortho will follow      Sheridan Guillen MD

## 2023-11-19 NOTE — ANESTHESIA POSTPROCEDURE EVALUATION
Post-Op Assessment Note    CV Status:  Stable  Pain Score: 0    Pain management: adequate       Mental Status:  Alert and awake   Hydration Status:  Euvolemic   PONV Controlled:  Controlled   Airway Patency:  Patent     Post Op Vitals Reviewed: Yes    No anethesia notable event occurred.     Staff: CRNA               BP   87/51   Temp   97.8   Pulse  78   Resp   16   SpO2   97

## 2023-11-19 NOTE — PROGRESS NOTES
Progress Note - Orthopedics   Ayo Willoughby 63 y.o. female MRN: [id number]  Unit/Bed#: PACU 02      Subjective:    63 y. o.female POD 0 right index finger I&D. No acute events, no new complaints. Pain well controlled. Labs:  0   Lab Value Date/Time    HCT 40.2 11/19/2023 0022    HCT 37.5 11/11/2022 0610    HCT 38.2 08/02/2021 1642    HGB 12.5 11/19/2023 0022    HGB 12.5 11/11/2022 0610    HGB 12.6 08/02/2021 1642    WBC 8.24 11/19/2023 0022    WBC 7.96 11/11/2022 0610    WBC 4.10 (L) 08/02/2021 1642       Meds:    Current Facility-Administered Medications:     fentaNYL (SUBLIMAZE) injection 25 mcg, 25 mcg, Intravenous, Q5 Min PRN, Walker Drop, CRNA    HYDROmorphone (DILAUDID) injection 0.5 mg, 0.5 mg, Intravenous, Q10 Min PRN, Walker Drop, CRNA    metoclopramide (REGLAN) injection 10 mg, 10 mg, Intravenous, Once PRN, Walker Drop, CRNA    ondansetron [facility]) injection 4 mg, 4 mg, Intravenous, Once PRN, Walker Drop, CRNA    Blood Culture:   No results found for: "Caroline Xavier"    Wound Culture:   No results found for: "WOUNDCULT"    Ins and Outs:  I/O last 24 hours: In: 400 [I.V.:400]  Out: -           Physical:  Vitals:    11/19/23 1353   BP: (!) 87/51   Pulse: 91   Resp: 13   Temp: (!) 97.4 °F (36.3 °C)     Musculoskeletal: right Upper Extremity  Dressing c/d/i  TTP about index finger  Patient still sedated, post-anesthetic, unable to assess motor or sensation   Digits warm and well perfused  Capillary refill < 2 seconds    Assessment:    63 y. o.female POD 0 right index finger I&D. Patient doing well.     Plan:  NWB right hand  Antibiotics per primary - recommend infectious disease consultation  Plan for wound check Tuesday  PT/OT  Pain control  DVT ppx - per primary   Rest of care per primary team  Dispo: Ortho will follow    Milton Dorsey MD

## 2023-11-19 NOTE — PROGRESS NOTES
Sulaiman Ash is a 63 y.o. female who is currently ordered Vancomycin IV with management by the Pharmacy Consult service. Relevant clinical data and objective / subjective history reviewed. Vancomycin Assessment:  Indication and Goal AUC/Trough: Soft tissue (goal -600, trough >10)  Clinical Status: stable  Micro:     Renal Function:  SCr: 0.69 mg/dL  CrCl: 76.7 mL/min  Renal replacement: Not on dialysis  Days of Therapy: 1  Current Dose:  1,000 mg IV one time dose  Vancomycin Plan:  New Dosin mg IV q 12   Estimated AUC: 415 mcg*hr/mL  Estimated Trough: 12.8 mcg/mL  Next Level:  with AM labs  Renal Function Monitoring: Daily BMP and [place] will continue to follow closely for s/sx of nephrotoxicity, infusion reactions and appropriateness of therapy. BMP and CBC will be ordered per protocol. We will continue to follow the patient’s culture results and clinical progress daily.     [facility], Pharmacist

## 2023-11-19 NOTE — H&P
H&P - Family Medicine Residency, Dylan Cruz Faix 1965, 63 y.o. female. MRN: [id number]    Unit/Bed#: OR POOL Encounter: [id number]  Primary Care Provider: Soto Rosa PA-C      Admission Date: 11/19/2023 1150    Assessments & Plans:   Plans discussed with [facility] team and finalization is pending attending physician Dr. Carpio Lacks attestation. * Finger infection  Assessment & Plan  Patient had been bitten by her pet rat 4 days prior to presentation. Since then, she developed, swelling, pain and drainage from the Left 2nd finger. ED management:  - Vitals remain stable and CBC, CMP are WNL  - Patient was given the Tdap vaccine and started on Vancomycin and Ceftriaxone  - Discussed with Hand surgeon Dr. Zachariah Jacobsen, recommend transfer for OR washout    Plan:  Consult hand surgery team - needs OR washout  NPO on admission  Continue Vancomycin and Ceftriaxone  Pain regimen      Suicidal ideation  Assessment & Plan  History of chronic bipolar disorder, depression on Prozac 40mg daily, Seroquel 300mg qhs, Ativan 0.5mg PRN  Unable to view PDMP  Per chart review, was following psychiatry and was prescribed medications through [facility], where she also received therapy. Ran out of her medications 4 days ago. Expressed active plan for SI while in the ED. Told ED provider "today I was feeling suicidal, if I had my Seroquel I would taken enough to end it all."   Could not be evaluated by crisis as she was not medically cleared. After being evaluated following procedure, patient states she has just been feeling unlike herself since being off of her psych medications. She denies any current suicidal ideation.      Plan:  Consult psychiatry  Finger foods for diet  Resumed home psych meds of 600 mg quetiapine QHS, 100 mg quetiapine in AM and 40 mg fluoxetine BID     Depression  Assessment & Plan  See A/P under SI    Bipolar disorder [facility])  Assessment & Plan  See A/P under SI    Hypothyroidism  Assessment & Plan  Last TSH back in 2021, WNL at 0.661    Plan:  - Continue levothyroxine 175mcg daily for now,   - Will order repeat TSH and titrate as needed    Rash  Assessment & Plan  Patient has red itchy rash over lower abdomen. States this rash is not new. Reports it started after she developed a skin fold after losing weight following bariatric surgery. Area appears red and irritated on exam.    Plan:  -nystatin powder ordered     History of Arnav-en-Y gastric bypass  Assessment & Plan  Performed in 2015 at [facility] with Dr. Luis Manuel Lentz   Avoid NSAIDs  Continue home supplements        Patient Active Problem List   Diagnosis    Closed bimalleolar fracture of left ankle    Multiple closed fractures of foot, initial encounter    Hypothyroidism    History of Arnav-en-Y gastric bypass    Allergic rhinitis    Bipolar disorder ([address])    Depression    Vitamin D deficiency    Suicidal ideation    Finger infection    Abnormal liver function tests    Attention deficit disorder    Bipolar affective disorder, depressed, severe, with psychotic behavior ([address])    Bipolar I disorder, most recent episode (or current) depressed, severe, specified as with psychotic behavior ([address])    Brachial plexus lesions    Gastroesophageal reflux disease without esophagitis    High risk medication use    History of bariatric surgery    History of diabetes mellitus    Irritable bowel syndrome    Mixed hyperlipidemia    Rash       Diet: Diet Regular; Finger Foods;  Finger Foods  VTE Pharm PPX: Enoxaparin (Lovenox)  VTE Mech PPX: sequential compression device    Code Status:  Level 1 - Full Code      Disposition: Admit to Observation under Med-surg   Consult: IP CONSULT TO PSYCHIATRY  IP CONSULT TO ORTHOPEDIC SURGERY  IP CONSULT TO INFECTIOUS DISEASES  IP CONSULT TO PHARMACY    Chief Complaints:   Finger pain    History of Presenting Illness:     Patient transferred from [facility] ED for hand surgery evaluation, also requiring psychiatry evaluation for active SI. PMH significant for bipolar disorder, depression, hypothyroidism, hx of gastric bypass surgery. Four days prior to presenting in the ED, the patient got bit by her pet rat on her L-2nd finger. There were lacerations/bleeding which overall resolved, but she developed edema, pain, and drainage from the finger. Patient was given the Tdap booster and started on antibiotics. Plan was discussed with Dr. Nalini Waston who recommended transfer for OR washout. Pt also admitted to having active SI in the ED. Pt follows with psychiatry, [facility] where she is prescribed medications and get therapy. She ran out of her medications including Seroquel and Prozac 4 days ago and since then, she stated "today I was feeling suicidal, if I had my Seroquel I would taken enough to end it all." As patient was not medically cleared, she was not evaluated by the crisis team. Patient seen at bedside following debridement of finger. She endorses she has been in a bad mental state since running out of her medications and last saw her psychiatrist 5 months ago. She denies any current suicidal thoughts.        ED Management:     ED Triage Vitals   Temperature Pulse Respirations Blood Pressure SpO2   11/19/23 1353 11/19/23 1353 11/19/23 1353 11/19/23 1353 11/19/23 1353   (!) 97.4 °F (36.3 °C) 91 13 (!) 87/51 94 %      Temp src Heart Rate Source Patient Position - Orthostatic VS BP Location FiO2 (%)   -- -- -- -- --             Pain Score       11/19/23 1152       8         Medications   QUEtiapine (SEROquel) tablet 100 mg (has no administration in time range)   QUEtiapine (SEROquel) tablet 600 mg (has no administration in time range)   levothyroxine tablet 175 mcg (has no administration in time range)   enoxaparin (LOVENOX) subcutaneous injection 40 mg (has no administration in time range)   nystatin (MYCOSTATIN) powder (has no administration in time range)   ondansetron (ZOFRAN) injection 4 mg (has no administration in time range) promethazine (PHENERGAN) injection 6.25 mg (has no administration in time range)   albuterol inhalation solution 2.5 mg (has no administration in time range)   oxyCODONE (ROXICODONE) IR tablet 2.5 mg (has no administration in time range)     Or   oxyCODONE (ROXICODONE) IR tablet 5 mg (has no administration in time range)   HYDROmorphone (DILAUDID) injection 0.2 mg (has no administration in time range)   acetaminophen (TYLENOL) tablet 650 mg (has no administration in time range)   polyethylene glycol (MIRALAX) packet 17 g (has no administration in time range)   FLUoxetine (PROzac) capsule 40 mg (has no administration in time range)   fentanyl citrate (PF) 100 MCG/2ML 25 mcg (25 mcg Intravenous Given 11/19/23 1450)   cefTRIAXone (ROCEPHIN) 1,000 mg in dextrose 5 % 50 mL IVPB (has no administration in time range)   vancomycin (VANCOCIN) IVPB (premix in dextrose) 1,000 mg 200 mL (has no administration in time range)       Review of System:   Review of Systems   Constitutional:  Negative for chills and fever. HENT:  Negative for ear pain and sore throat. Eyes:  Negative for pain and visual disturbance. Respiratory:  Negative for cough and shortness of breath. Cardiovascular:  Negative for chest pain and palpitations. Gastrointestinal:  Negative for abdominal pain and vomiting. Genitourinary:  Negative for dysuria and hematuria. Musculoskeletal:  Negative for arthralgias and back pain. Skin:  Positive for rash. Negative for color change. Neurological:  Negative for seizures and syncope. All other systems reviewed and are negative.       History:     Past Medical History:   Diagnosis Date    Ankle fracture     Attention deficit disorder 11/1/2011    Bipolar 1 disorder ([address])     Depression     Depression 7/19/2021    Disease of thyroid gland     Foot fracture, left     Pneumonia 12/2008    Psychiatric disorder     Renal disorder     Tachycardia 12/2008     Past Surgical History:   Procedure Laterality Date    GASTRIC BYPASS  2016    ALIS-EN-Y PROCEDURE       Social History     Socioeconomic History    Marital status: Single     Spouse name: Not on file    Number of children: Not on file    Years of education: Not on file    Highest education level: Not on file   Occupational History    Not on file   Tobacco Use    Smoking status: Never    Smokeless tobacco: Never   Substance and Sexual Activity    Alcohol use: Never    Drug use: Never    Sexual activity: Not on file   Other Topics Concern    Not on file   Social History Narrative    ** Merged History Encounter **          Social Determinants of Health     Financial Resource Strain: High Risk (2021)    Overall Financial Resource Strain (CARDIA)     Difficulty of Paying Living Expenses: Hard   Food Insecurity: No Food Insecurity (2021)    Hunger Vital Sign     Worried About Running Out of Food in the Last Year: Never true     Ran Out of Food in the Last Year: Never true   Transportation Needs: No Transportation Needs (2021)    PRAPARE - Transportation     Lack of Transportation (Medical): No     Lack of Transportation (Non-Medical): No   Physical Activity: Not on file   Stress: Not on file   Social Connections: Not on file   Intimate Partner Violence: Not on file   Housing Stability: Not on file     Family History   Adopted: Yes       Medications & Allergies:   PTA meds:   Prior to Admission Medications   Prescriptions Last Dose Informant Patient Reported? Taking?    B Complex Vitamins (Vitamin B Complex) TABS   No No   Sig: Take 2 tablets by mouth daily   Calcium Citrate-Vitamin D (CALCET CREAMY BITES) 500-400 MG-UNIT CHEW  Self Yes No   Si to 3 daily   Cyanocobalamin (B-12) 5000 MCG CAPS  Self Yes No   Sig: every 24 hours   FLUoxetine (PROzac) 40 MG capsule  Self Yes No   Si (two) times a day   LORazepam (ATIVAN) 0.5 mg tablet  Self Yes No   Multiple Vitamin (Multivitamin Adult) TABS   No No   Sig: Take 2 tablets by mouth daily QUEtiapine (SEROquel) 100 mg tablet   Yes No   Sig: Take 100 mg by mouth daily   QUEtiapine (SEROquel) 300 mg tablet  Self Yes No   Si mg 2 (two) times a day   cetirizine (ZyrTEC) 10 mg tablet   No No   Sig: Take 1 tablet (10 mg total) by mouth daily   ergocalciferol (VITAMIN D2) 50,000 units   No No   Sig: Take 1 capsule (50,000 Units total) by mouth once a week   ferrous sulfate 324 (65 Fe) mg   No No   Sig: Take 1 tablet (324 mg total) by mouth 3 (three) times a day before meals   levothyroxine 175 mcg tablet   No No   Sig: take 1 tablet by mouth once daily      Facility-Administered Medications: None     No Known Allergies    PTA Medications:  Current Facility-Administered Medications on File Prior to Encounter   Medication Dose Route Frequency Provider Last Rate Last Admin    [COMPLETED] cefTRIAXone (ROCEPHIN) IVPB (premix in dextrose) 2,000 mg 50 mL  2,000 mg Intravenous Once Spijkenibyrone, PA-C   Stopped at 23 0116    [COMPLETED] ketorolac (TORADOL) injection 15 mg  15 mg Intravenous Once Spijkenisse, PA-C   15 mg at 23 0026    [COMPLETED] sodium chloride 0.9 % bolus 1,000 mL  1,000 mL Intravenous Once Hipolito Memory, PA-C   Stopped at 23 1120    [COMPLETED] tetanus-diphtheria-acellular pertussis (BOOSTRIX) IM injection 0.5 mL  0.5 mL Intramuscular Once Spijkenisse, PA-C   0.5 mL at 23 0026    [COMPLETED] vancomycin (VANCOCIN) IVPB (premix in dextrose) 1,000 mg 200 mL  15 mg/kg Intravenous Once Spijkenisse, PA-C   Stopped at 23 0255    [DISCONTINUED] FLUoxetine (PROzac) capsule 40 mg  40 mg Oral BID Spijkenisse, PA-C   40 mg at 23 9833    [DISCONTINUED] QUEtiapine (SEROquel) tablet 100 mg  100 mg Oral Daily Spijkenisse, PA-C   100 mg at 23 0917    [DISCONTINUED] QUEtiapine (SEROquel) tablet 300 mg  300 mg Oral BID Spialondraenisse, PA-C        [DISCONTINUED] QUEtiapine (SEROquel) tablet 600 mg  600 mg Oral HS LORNA ContrerasC [DISCONTINUED] QUEtiapine (SEROquel) tablet 600 mg  600 mg Oral HS Samanta Roberts PA-C   600 mg at 11/19/23 0241     Current Outpatient Medications on File Prior to Encounter   Medication Sig Dispense Refill    B Complex Vitamins (Vitamin B Complex) TABS Take 2 tablets by mouth daily 180 tablet 1    Calcium Citrate-Vitamin D (CALCET CREAMY BITES) 500-400 MG-UNIT CHEW 2 to 3 daily      cetirizine (ZyrTEC) 10 mg tablet Take 1 tablet (10 mg total) by mouth daily 90 tablet 1    Cyanocobalamin (B-12) 5000 MCG CAPS every 24 hours      ergocalciferol (VITAMIN D2) 50,000 units Take 1 capsule (50,000 Units total) by mouth once a week 12 capsule 1    ferrous sulfate 324 (65 Fe) mg Take 1 tablet (324 mg total) by mouth 3 (three) times a day before meals 90 tablet 2    FLUoxetine (PROzac) 40 MG capsule 2 (two) times a day      levothyroxine 175 mcg tablet take 1 tablet by mouth once daily 30 tablet 0    LORazepam (ATIVAN) 0.5 mg tablet   0    Multiple Vitamin (Multivitamin Adult) TABS Take 2 tablets by mouth daily 180 tablet 1    QUEtiapine (SEROquel) 100 mg tablet Take 100 mg by mouth daily      QUEtiapine (SEROquel) 300 mg tablet 300 mg 2 (two) times a day           Objective & Vitals:     Vitals: BP 99/65   Pulse 64   Temp (!) 97.4 °F (36.3 °C)   Resp 12   SpO2 97%       Intake/Output Summary (Last 24 hours) at 11/19/2023 1533  Last data filed at 11/19/2023 1356  Gross per 24 hour   Intake 400 ml   Output --   Net 400 ml       Invasive Devices       Peripheral Intravenous Line  Duration             Peripheral IV 11/19/23 Left;Ventral (anterior) Forearm <1 day              Drain  Duration             Open Drain Anterior;Right Hand <1 day                      Labs: I have personally reviewed pertinent reports.     Recent Results (from the past 24 hour(s))   CBC and differential    Collection Time: 11/19/23 12:22 AM   Result Value Ref Range    WBC 8.24 4.31 - 10.16 Thousand/uL    RBC 4.19 3.81 - 5.12 Million/uL Hemoglobin 12.5 11.5 - 15.4 g/dL    Hematocrit 40.2 34.8 - 46.1 %    MCV 96 82 - 98 fL    MCH 29.8 26.8 - 34.3 pg    MCHC 31.1 (L) 31.4 - 37.4 g/dL    RDW 12.5 11.6 - 15.1 %    MPV 10.4 8.9 - 12.7 fL    Platelets 315 455 - 877 Thousands/uL    nRBC 0 /100 WBCs    Neutrophils Relative 67 43 - 75 %    Immat GRANS % 1 0 - 2 %    Lymphocytes Relative 18 14 - 44 %    Monocytes Relative 13 (H) 4 - 12 %    Eosinophils Relative 0 0 - 6 %    Basophils Relative 1 0 - 1 %    Neutrophils Absolute 5.54 1.85 - 7.62 Thousands/µL    Immature Grans Absolute 0.08 0.00 - 0.20 Thousand/uL    Lymphocytes Absolute 1.46 0.60 - 4.47 Thousands/µL    Monocytes Absolute 1.09 0.17 - 1.22 Thousand/µL    Eosinophils Absolute 0.03 0.00 - 0.61 Thousand/µL    Basophils Absolute 0.04 0.00 - 0.10 Thousands/µL   Comprehensive metabolic panel    Collection Time: 11/19/23 12:22 AM   Result Value Ref Range    Sodium 135 135 - 147 mmol/L    Potassium 4.0 3.5 - 5.3 mmol/L    Chloride 102 96 - 108 mmol/L    CO2 23 21 - 32 mmol/L    ANION GAP 10 mmol/L    BUN 19 5 - 25 mg/dL    Creatinine 0.69 0.60 - 1.30 mg/dL    Glucose 104 65 - 140 mg/dL    Calcium 9.1 8.4 - 10.2 mg/dL    AST 17 13 - 39 U/L    ALT 10 7 - 52 U/L    Alkaline Phosphatase 76 34 - 104 U/L    Total Protein 7.1 6.4 - 8.4 g/dL    Albumin 4.0 3.5 - 5.0 g/dL    Total Bilirubin 0.40 0.20 - 1.00 mg/dL    eGFR 96 ml/min/1.73sq m       Imaging:     I have personally reviewed pertinent reports. No results found. EKG, Pathology, and Other Studies:   I have personally reviewed pertinent reports. Physical Exam:   Physical Exam  Constitutional:       Appearance: Normal appearance. HENT:      Head: Normocephalic and atraumatic. Mouth/Throat:      Mouth: Mucous membranes are moist.   Cardiovascular:      Rate and Rhythm: Normal rate and regular rhythm. Pulses: Normal pulses. Heart sounds: Normal heart sounds. No murmur heard. No friction rub. No gallop.    Pulmonary:      Effort: Pulmonary effort is normal.      Breath sounds: Normal breath sounds. No wheezing, rhonchi or rales. Abdominal:      General: Abdomen is flat. Palpations: Abdomen is soft. Musculoskeletal:      Comments: Right hand bandaged   Skin:     Findings: Rash (Red excoriated rash on lower abdomen) present. Neurological:      Mental Status: She is alert.            Reyna Romo MD  PGY-1, Family Medicine  11/19/23  3:33 PM

## 2023-11-19 NOTE — ASSESSMENT & PLAN NOTE
History of chronic bipolar disorder, depression on Prozac 40mg daily, Seroquel 300mg qhs, Ativan 0.5mg PRN  Unable to view PDMP  Per chart review, was following psychiatry and was prescribed medications through [facility], where she also received therapy. Ran out of her medications 4 days ago. Expressed active plan for SI while in the ED. Told ED provider "today I was feeling suicidal, if I had my Seroquel I would taken enough to end it all."   Could not be evaluated by crisis as she was not medically cleared. After being evaluated following procedure, patient states she has just been feeling unlike herself since being off of her psych medications. She denies any current suicidal ideation.      Plan:  Cleared by psych for return home on home psych medications with outpatient followup  Resumed regular diet  Resumed home psych meds of 600 mg quetiapine QHS, 100 mg quetiapine in AM and 40 mg fluoxetine BID and lorazapam 0.5 daily PRN  Prescription pended for 30-day supply of psychiatry medication, advised patient to make follow-up with [facility]  1:1 discontinued, cleared by psych

## 2023-11-19 NOTE — ANESTHESIA PREPROCEDURE EVALUATION
Procedure:  DEBRIDEMENT HAND/FINGER ([address]), RIGHT INDEX (Right: Hand)    Relevant Problems   CARDIO   (+) Mixed hyperlipidemia      ENDO   (+) Hypothyroidism      GI/HEPATIC   (+) Gastroesophageal reflux disease without esophagitis      NEURO/PSYCH   (+) Bipolar affective disorder, depressed, severe, with psychotic behavior (HCC)   (+) Depression      Digestive   (+) Irritable bowel syndrome      Nervous and Auditory   (+) Brachial plexus lesions      Musculoskeletal and Integument   (+) Closed bimalleolar fracture of left ankle   (+) Multiple closed fractures of foot, initial encounter      Other   (+) Abnormal liver function tests   (+) Attention deficit disorder   (+) Bipolar I disorder, most recent episode (or current) depressed, severe, specified as with psychotic behavior ([address])   (+) Bipolar disorder (HCC)   (+) Finger infection   (+) High risk medication use   (+) History of Arnav-en-Y gastric bypass   (+) History of bariatric surgery   (+) History of diabetes mellitus   (+) Suicidal ideation      Lab Results   Component Value Date    SODIUM 135 11/19/2023    K 4.0 11/19/2023     11/19/2023    CO2 23 11/19/2023    AGAP 10 11/19/2023    BUN 19 11/19/2023    CREATININE 0.69 11/19/2023    GLUC 104 11/19/2023    GLUF 86 08/02/2021    CALCIUM 9.1 11/19/2023    AST 17 11/19/2023    ALT 10 11/19/2023    ALKPHOS 76 11/19/2023    TP 7.1 11/19/2023    TBILI 0.40 11/19/2023    EGFR 96 11/19/2023     Lab Results   Component Value Date    WBC 8.24 11/19/2023    HGB 12.5 11/19/2023    HCT 40.2 11/19/2023    MCV 96 11/19/2023     11/19/2023         Physical Exam    Airway    Mallampati score: II  TM Distance: >3 FB  Neck ROM: full     Dental   No notable dental hx     Cardiovascular  Rhythm: regular, Rate: normal, Cardiovascular exam normal    Pulmonary  Pulmonary exam normal Breath sounds clear to auscultation    Other Findings  post-pubertal.      Anesthesia Plan  ASA Score- 2     Anesthesia Type- IV sedation with anesthesia with ASA Monitors. Additional Monitors:     Airway Plan:     Comment: Discussed risks/benefits, including medication reactions, awareness, aspiration, and serious/life threatening complications. Plan to maintain native airway with IVGA, monitored with EtCO2. Plan Factors-Exercise tolerance (METS): >4 METS. Chart reviewed. EKG reviewed. Imaging results reviewed. Existing labs reviewed. Patient summary reviewed. Patient instructed to abstain from smoking on day of procedure. Patient did not smoke on day of surgery. Induction- intravenous. Postoperative Plan-     Informed Consent- Anesthetic plan and risks discussed with patient. I personally reviewed this patient with the CRNA. Discussed and agreed on the Anesthesia Plan with the CRNA. Siva Mckeon

## 2023-11-19 NOTE — OP NOTE
OPERATIVE REPORT  PATIENT NAME: Aaliyah Vila    :  1965  MRN: [id number]  Pt Location:  OR ROOM 04    SURGERY DATE: 2023    Surgeon(s) and Role:     * Justin Maya MD - Primary     * Louann Pinto MD - Assisting     * Delberta Mortimer, MD - Assisting    Preop Diagnosis:  Finger infection [L08.9]    Post-Op Diagnosis Codes:     * Finger infection [L08.9]    Procedure(s):  Right - right index finger abscess and flexor sheath irrigation and debridement    Specimen(s):  ID Type Source Tests Collected by Time Destination   A : right index finger Tissue Finger, Right ANAEROBIC CULTURE AND GRAM STAIN, CULTURE, TISSUE AND GRAM STAIN Justin Maya MD 2023 12:55 PM        Estimated Blood Loss:   Minimal    Drains:  Open Drain Anterior;Right Hand (Active)   Number of days: 0       Anesthesia Type:   Choice    Operative Indications:  Finger infection [L08.9]  Concern for flexor tenosynovitis. Operative Findings:  Large soft tissue abscess on the volar index finger and radial palm. There was destruction of the flexor sheath and pulleys. Only the A1 pulley remained intact on inspection. Significant purulence around the flexor tendon with fibrinous material deep to it. Radial ulnar digital nerves and arteries intact. Complications:   None    Procedure and Technique:  The patient was greeted in the preoperative area. The risks, benefits, and alternatives of the procedure were discussed in detail with the patient. Risks include pain, stiffness, swelling, injury to neurovascular structure, infection, need for reoperation, and anesthetic complications. The patient was amenable and signed the informed consent. The operative extremity was marked. Patient was brought to the operating room and placed under anesthesia. A tourniquet was applied. The operative extremity was prepped and draped in the usual sterile fashion.  A timeout was performed identifying the name and laterality of the procedure. The limb was gravity exsanguinated and the tourniquet was inflated. A digital nerve block was performed using a 10 cc one-to-one mixture of 1% lidocaine with epinephrine and 0.25% Marcaine. We then utilized her open wound on the volar ulnar index finger proximal phalanx to design a Deisy style incision extending into the radial palm. We made the center part of the incision first and spread down to the level of the flexor sheath. Significant purulence was encountered. This culture was sent for aerobic anaerobic. The flexor sheath was found to be destroyed at the level of the proximal phalanx and the A2 pulley was absent. We then extended our incision distally and proximally given the involvement of the flexor sheath. We found purulence tracking all the way to the level of the DIP joint distally and proximally to the proximal flexion crease of the palm. There is significant destruction of the flexor sheath found throughout. Only the A1 and A5 pulleys remained intact. The remainder of the sheath was opened and there was fraying of the flexor tendons. The flexor tendons were both intact. The radial and ulnar digital nerves and arteries were both intact as well. We then performed our excisional debridement of the unhealthy appearing tissue as well as fibrinous tissue at the level of the skin, subcutaneous tissue, flexor sheath over an area of 2 cm. We then thoroughly irrigated with 6 L of saline. We then took a second look and were satisfied with our debridement. The tourniquet was released and hemostasis achieved. The wound was closed in layers in an interrupted fashion over a Penrose drain. Sterile dressings were applied. The patient was awoken and transported to the recovery room in stable condition. I was present for the entire procedure. Postoperatively the Penrose drain will be pulled on postop day 2. She can then start soaks of the finger.   Recommend continued IV antibiotics, broad-spectrum, for the primary team's discretion. Recommend ID consult for narrowing antibiotic coverage. Follow-up with hand surgery 1 week following discharge.     Patient Disposition:  PACU         SIGNATURE: Twyla Camacho MD  DATE: November 19, 2023  TIME: 1:31 PM

## 2023-11-19 NOTE — ED NOTES
Report called to Tammy Jeffrey at [address] [place] [address] ([phone])     Olga Flaherty RN  11/19/23 8909

## 2023-11-20 PROBLEM — I95.0 IDIOPATHIC HYPOTENSION: Status: ACTIVE | Noted: 2023-11-20

## 2023-11-20 LAB
25(OH)D3 SERPL-MCNC: 13.2 NG/ML (ref 30–100)
ANION GAP SERPL CALCULATED.3IONS-SCNC: 9 MMOL/L
BUN SERPL-MCNC: 10 MG/DL (ref 5–25)
CALCIUM SERPL-MCNC: 8.3 MG/DL (ref 8.4–10.2)
CHLORIDE SERPL-SCNC: 106 MMOL/L (ref 96–108)
CO2 SERPL-SCNC: 24 MMOL/L (ref 21–32)
CREAT SERPL-MCNC: 0.58 MG/DL (ref 0.6–1.3)
CRP SERPL QL: 68.1 MG/L
ERYTHROCYTE [DISTWIDTH] IN BLOOD BY AUTOMATED COUNT: 12.7 % (ref 11.6–15.1)
ERYTHROCYTE [SEDIMENTATION RATE] IN BLOOD: 42 MM/HOUR (ref 0–29)
GFR SERPL CREATININE-BSD FRML MDRD: 102 ML/MIN/1.73SQ M
GLUCOSE P FAST SERPL-MCNC: 92 MG/DL (ref 65–99)
GLUCOSE SERPL-MCNC: 92 MG/DL (ref 65–140)
HCT VFR BLD AUTO: 31 % (ref 34.8–46.1)
HGB BLD-MCNC: 10.1 G/DL (ref 11.5–15.4)
MCH RBC QN AUTO: 30.4 PG (ref 26.8–34.3)
MCHC RBC AUTO-ENTMCNC: 32.6 G/DL (ref 31.4–37.4)
MCV RBC AUTO: 93 FL (ref 82–98)
PLATELET # BLD AUTO: 279 THOUSANDS/UL (ref 149–390)
PMV BLD AUTO: 9.8 FL (ref 8.9–12.7)
POTASSIUM SERPL-SCNC: 3.8 MMOL/L (ref 3.5–5.3)
RBC # BLD AUTO: 3.32 MILLION/UL (ref 3.81–5.12)
SODIUM SERPL-SCNC: 139 MMOL/L (ref 135–147)
TSH SERPL DL<=0.05 MIU/L-ACNC: 24.27 UIU/ML (ref 0.45–4.5)
VIT B12 SERPL-MCNC: 315 PG/ML (ref 180–914)
WBC # BLD AUTO: 6.19 THOUSAND/UL (ref 4.31–10.16)

## 2023-11-20 PROCEDURE — NC001 PR NO CHARGE: Performed by: FAMILY MEDICINE

## 2023-11-20 PROCEDURE — 99223 1ST HOSP IP/OBS HIGH 75: CPT | Performed by: FAMILY MEDICINE

## 2023-11-20 PROCEDURE — 86140 C-REACTIVE PROTEIN: CPT | Performed by: ORTHOPAEDIC SURGERY

## 2023-11-20 PROCEDURE — 85027 COMPLETE CBC AUTOMATED: CPT

## 2023-11-20 PROCEDURE — 85652 RBC SED RATE AUTOMATED: CPT | Performed by: ORTHOPAEDIC SURGERY

## 2023-11-20 PROCEDURE — 99223 1ST HOSP IP/OBS HIGH 75: CPT | Performed by: INTERNAL MEDICINE

## 2023-11-20 PROCEDURE — 82306 VITAMIN D 25 HYDROXY: CPT

## 2023-11-20 PROCEDURE — 82607 VITAMIN B-12: CPT

## 2023-11-20 PROCEDURE — 84443 ASSAY THYROID STIM HORMONE: CPT

## 2023-11-20 PROCEDURE — NC001 PR NO CHARGE: Performed by: ORTHOPAEDIC SURGERY

## 2023-11-20 PROCEDURE — 80048 BASIC METABOLIC PNL TOTAL CA: CPT

## 2023-11-20 PROCEDURE — 99222 1ST HOSP IP/OBS MODERATE 55: CPT | Performed by: PSYCHIATRY & NEUROLOGY

## 2023-11-20 RX ORDER — VANCOMYCIN HYDROCHLORIDE 1 G/200ML
1000 INJECTION, SOLUTION INTRAVENOUS EVERY 12 HOURS
Status: DISCONTINUED | OUTPATIENT
Start: 2023-11-20 | End: 2023-11-22

## 2023-11-20 RX ORDER — VANCOMYCIN HYDROCHLORIDE 1 G/200ML
1000 INJECTION, SOLUTION INTRAVENOUS EVERY 12 HOURS
Status: DISCONTINUED | OUTPATIENT
Start: 2023-11-20 | End: 2023-11-20

## 2023-11-20 RX ORDER — METRONIDAZOLE 500 MG/100ML
500 INJECTION, SOLUTION INTRAVENOUS EVERY 8 HOURS
Status: DISCONTINUED | OUTPATIENT
Start: 2023-11-20 | End: 2023-11-20

## 2023-11-20 RX ORDER — B-COMPLEX WITH VITAMIN C
2 TABLET ORAL DAILY
Status: DISCONTINUED | OUTPATIENT
Start: 2023-11-20 | End: 2023-11-20

## 2023-11-20 RX ORDER — FERROUS SULFATE 325(65) MG
325 TABLET ORAL
Status: DISCONTINUED | OUTPATIENT
Start: 2023-11-21 | End: 2023-11-22 | Stop reason: HOSPADM

## 2023-11-20 RX ORDER — ERGOCALCIFEROL 1.25 MG/1
50000 CAPSULE ORAL WEEKLY
Status: DISCONTINUED | OUTPATIENT
Start: 2023-11-20 | End: 2023-11-22 | Stop reason: HOSPADM

## 2023-11-20 RX ADMIN — FLUOXETINE 40 MG: 20 CAPSULE ORAL at 08:21

## 2023-11-20 RX ADMIN — SODIUM CHLORIDE, SODIUM LACTATE, POTASSIUM CHLORIDE, AND CALCIUM CHLORIDE 125 ML/HR: .6; .31; .03; .02 INJECTION, SOLUTION INTRAVENOUS at 14:32

## 2023-11-20 RX ADMIN — VANCOMYCIN HYDROCHLORIDE 1000 MG: 1 INJECTION, SOLUTION INTRAVENOUS at 17:15

## 2023-11-20 RX ADMIN — ENOXAPARIN SODIUM 40 MG: 40 INJECTION SUBCUTANEOUS at 08:21

## 2023-11-20 RX ADMIN — OXYCODONE HYDROCHLORIDE 5 MG: 5 TABLET ORAL at 12:13

## 2023-11-20 RX ADMIN — VANCOMYCIN HYDROCHLORIDE 750 MG: 750 INJECTION, SOLUTION INTRAVENOUS at 05:31

## 2023-11-20 RX ADMIN — CEFTRIAXONE SODIUM 1000 MG: 10 INJECTION, POWDER, FOR SOLUTION INTRAVENOUS at 00:18

## 2023-11-20 RX ADMIN — SODIUM CHLORIDE, SODIUM LACTATE, POTASSIUM CHLORIDE, AND CALCIUM CHLORIDE 125 ML/HR: .6; .31; .03; .02 INJECTION, SOLUTION INTRAVENOUS at 05:24

## 2023-11-20 RX ADMIN — FLUOXETINE 40 MG: 20 CAPSULE ORAL at 17:11

## 2023-11-20 RX ADMIN — OXYCODONE HYDROCHLORIDE 5 MG: 5 TABLET ORAL at 21:15

## 2023-11-20 RX ADMIN — LEVOTHYROXINE SODIUM 175 MCG: 125 TABLET ORAL at 08:21

## 2023-11-20 RX ADMIN — OXYCODONE HYDROCHLORIDE 5 MG: 5 TABLET ORAL at 05:21

## 2023-11-20 RX ADMIN — QUETIAPINE FUMARATE 100 MG: 100 TABLET ORAL at 08:21

## 2023-11-20 RX ADMIN — ERGOCALCIFEROL 50000 UNITS: 1.25 CAPSULE ORAL at 17:11

## 2023-11-20 RX ADMIN — METRONIDAZOLE 500 MG: 500 INJECTION, SOLUTION INTRAVENOUS at 15:13

## 2023-11-20 RX ADMIN — HYDROMORPHONE HYDROCHLORIDE 0.2 MG: 1 INJECTION, SOLUTION INTRAMUSCULAR; INTRAVENOUS; SUBCUTANEOUS at 14:32

## 2023-11-20 RX ADMIN — NYSTATIN: 100000 POWDER TOPICAL at 08:32

## 2023-11-20 RX ADMIN — HYDROMORPHONE HYDROCHLORIDE 0.2 MG: 1 INJECTION, SOLUTION INTRAMUSCULAR; INTRAVENOUS; SUBCUTANEOUS at 00:18

## 2023-11-20 RX ADMIN — QUETIAPINE FUMARATE 600 MG: 300 TABLET ORAL at 21:15

## 2023-11-20 RX ADMIN — SODIUM CHLORIDE 1000 ML: 0.9 INJECTION, SOLUTION INTRAVENOUS at 21:50

## 2023-11-20 NOTE — CONSULTS
Consultation - Infectious Disease   Fracisco Schultzair Willoughby 63 y.o. female MRN: [id number]  Unit/Bed#: [facility] 612-01 Encounter: [id number]      IMPRESSION & RECOMMENDATIONS:   Impression/Recommendations:  1. Right index finger abscess with destruction of flexor sheath from rat bite on 11/14/23. S/p I&D on 11/19/23 by ortho. Deep infection warranting IV antibiotics. Less likely RBF with patient almost 7 days from exposure without systemic signs of infection. Patient without MRSA risk factors. S/p TDAP vaccine, does not need rabies treatment in the setting of a rat bite. Rec: Increase ceftriaxone to 2g qd and add metronidazole 500 mg q8hrs for anaerobic coverage. OR start Augmentin 3g q6hrs. D/c vancomycin  Tissue gram stain negative, f/up anaerobic cx      Antibiotics:  Increase ceftriaxone to 2g qd and add metronidazole 500 mg q8hrs for anaerobic coverage. OR start Augmentin 3g q6hrs. D/c vancomycin. Thank you for this consultation. We will follow along with you. HISTORY OF PRESENT ILLNESS:  Reason for Consult: right finger infection. HPI: Cy Chavez is a 63 y.o. female with a PMHx of depression, bipolar, hypothyroidism, gastric bypass surgery, who presented to the ED on 11/19/23 for concerns of finger infection and SI. Four days PTA, pt was bitten by her pet rate to the right 2nd index finder when she was feeding them peanut butter. After the injury, she had immediate pain and bleeding. The following day, she subsequently developed purulent drainage. The redness, swelling, and erythema progressively worsened, prompting her to the ED. She denies any associated fevers, myalgias, arthralgias, vomiting, headache, or pharyngitis. Xray of the right index finger showed diffuse soft tissue swelling of the second digit. She underwent I&D of the right index finger abscess with Penrose drain on 11/19/23.  OP notes report large soft tissue abscess on volar index finger and radial palm with destruction of flexor sheath and pulleys. She was given TDAP and started on vanc/CTX. REVIEW OF SYSTEMS:    A complete system-based review of systems is otherwise negative. PAST MEDICAL HISTORY:  Past Medical History:   Diagnosis Date    Ankle fracture     Attention deficit disorder 2011    Bipolar 1 disorder (HCC)     Depression     Depression 2021    Disease of thyroid gland     Foot fracture, left     Pneumonia 2008    Psychiatric disorder     Renal disorder     Tachycardia 2008     Past Surgical History:   Procedure Laterality Date    GASTRIC BYPASS  2016    ALIS-EN-Y PROCEDURE         FAMILY HISTORY:  Non-contributory    SOCIAL HISTORY:  Social History     Substance and Sexual Activity   Alcohol Use Never     Social History     Substance and Sexual Activity   Drug Use Never     Social History     Tobacco Use   Smoking Status Never   Smokeless Tobacco Never       ALLERGIES:  No Known Allergies    MEDICATIONS:  All current active medications have been reviewed. PHYSICAL EXAM:  Vitals:  Temp:  [97.4 °F (36.3 °C)-98.2 °F (36.8 °C)] 98.2 °F (36.8 °C)  HR:  [64-92] 91  Resp:  [12-21] 17  BP: ()/(46-71) 88/54  SpO2:  [92 %-98 %] 92 %  Temp (24hrs), Av.9 °F (36.6 °C), Min:97.4 °F (36.3 °C), Max:98.2 °F (36.8 °C)  Current: Temperature: 98.2 °F (36.8 °C)     Physical Exam:  General:  Well-nourished, well-developed, in no acute distress  Eyes:  Conjunctive clear with no hemorrhages or effusions  Oropharynx:  No ulcers, no lesions  Neck:  Supple, no lymphadenopathy  Lungs:  Clear to auscultation bilaterally, no accessory muscle use  Cardiac:  Regular rate and rhythm, no murmurs  Abdomen:  Soft, non-tender, non-distended  Extremities:  No peripheral cyanosis, clubbing, or edema  Skin: right hand with surgical dressing and ace bandage. No tenderness or erythema surrounding the bandages. NV intact. No rashes.    Neurological:  Moves all four extremities spontaneously, sensation grossly intact      LABS, IMAGING, & OTHER STUDIES:  Lab Results:  I have personally reviewed pertinent labs. Results from last 7 days   Lab Units 11/20/23  0518 11/19/23  0022   POTASSIUM mmol/L 3.8 4.0   CHLORIDE mmol/L 106 102   CO2 mmol/L 24 23   BUN mg/dL 10 19   CREATININE mg/dL 0.58* 0.69   EGFR ml/min/1.73sq m 102 96   CALCIUM mg/dL 8.3* 9.1   AST U/L  --  17   ALT U/L  --  10   ALK PHOS U/L  --  76     Results from last 7 days   Lab Units 11/20/23  0518 11/19/23  0022   WBC Thousand/uL 6.19 8.24   HEMOGLOBIN g/dL 10.1* 12.5   PLATELETS Thousands/uL 279 218     Results from last 7 days   Lab Units 11/19/23  1255   GRAM STAIN RESULT  Rare Polys  No bacteria seen       Imaging Studies:   I have personally reviewed pertinent imaging study reports and images in PACS. EKG, Pathology, and Other Studies:   I have personally reviewed pertinent reports.       Albina Jiménez DO PGY-II

## 2023-11-20 NOTE — PROGRESS NOTES
Progress Notes - Family Medicine Residency, Dejon Graff 1965, 63 y.o. female. MRN: [id number]  Unit/Bed#: [facility] 247-40 Encounter: [id number]  Primary Care Provider: Radha Song PA-C      Admission Date: 11/19/2023 1150  Length of Stay: 1 days  Code Status:  Level 1 - Full Code  Disposition:   Consult:   IP CONSULT TO PSYCHIATRY  IP CONSULT TO ORTHOPEDIC SURGERY  IP CONSULT TO INFECTIOUS DISEASES  IP CONSULT TO PHARMACY         Assessment/Plan:      Plans discussed with [facility] team and finalization is pending attending physician attestation. Please, call  for any clarification. * Finger infection  Assessment & Plan  Patient had been bitten by her pet rat 4 days prior to presentation. Since then, she developed, swelling, pain and drainage from the Left 2nd finger. ED management:  - Vitals remain stable and CBC, CMP are WNL  - Patient was given the Tdap vaccine and started on Vancomycin and Ceftriaxone  - Discussed with Hand surgeon Dr. Tricia Bernal, recommend transfer for OR washout    Plan:  POD 1 from washout by ortho  Wound check tomorrow by Ortho  Continue Vancomycin and Ceftriaxone  F/u wound cultures  ID consult, appreciate recommendations   Pain regimen      Suicidal ideation  Assessment & Plan  History of chronic bipolar disorder, depression on Prozac 40mg daily, Seroquel 300mg qhs, Ativan 0.5mg PRN  Unable to view PDMP  Per chart review, was following psychiatry and was prescribed medications through [facility], where she also received therapy. Ran out of her medications 4 days ago. Expressed active plan for SI while in the ED. Told ED provider "today I was feeling suicidal, if I had my Seroquel I would taken enough to end it all."   Could not be evaluated by crisis as she was not medically cleared. After being evaluated following procedure, patient states she has just been feeling unlike herself since being off of her psych medications. She denies any current suicidal ideation. Plan:  Consult psychiatry  Finger foods for diet  Resumed home psych meds of 600 mg quetiapine QHS, 100 mg quetiapine in AM and 40 mg fluoxetine BID   1:1 at bedside    Depression  Assessment & Plan  See A/P under SI    Bipolar disorder ([address])  Assessment & Plan  See A/P under SI    Idiopathic hypotension  Assessment & Plan  Patient noted to be hypotensive to 88/40 on exam this a.m. Asymptomatic at this time. Plan:  -Continue IV fluids at 125 mL/h  -Encourage oral hydration  -Consider bolus if patient becomes symptomatic    Hypothyroidism  Assessment & Plan  Patient states she has thyroid medication at home but has not taken it in 1.5-2 weeks. Thyroid    Lab Results   Component Value Date    TNJ7BWDVVGJV 24.269 (H) 11/20/2023    DUA7XFOMWMLJ 0.661 10/26/2021    LAO3XPQAFFWJ 134.000 (H) 08/02/2021    FREET4 0.65 (L) 08/02/2021        Plan:   - Elevated TSH likely result of medication non-adherence  - Continue levothyroxine 175mcg daily for now  - Recheck TSH in 6 weeks outpatient and titrate levothyroxine dose accordingly    Rash  Assessment & Plan  Patient has red itchy rash over lower abdomen. States this rash is not new. Reports it started after she developed a skin fold after losing weight following bariatric surgery. Area appears red and irritated on exam.    Plan:  -nystatin powder ordered     History of Arnav-en-Y gastric bypass  Assessment & Plan  Performed in 2015 at [facility] with Dr. Hellen Kirkland   Avoid NSAIDs  B-vitamin levels last checked in 2021    Plan:  Recheck B vitamin levels  We will consider resuming home supplements if levels low          Diet: Diet Regular; Finger Foods;  Finger Foods    VTE [facility] PPX: Enoxaparin (Lovenox)  VTE [facility] PPX: sequential compression device      Subjective:   63 y.o. female admitted 11/19/2023 for Finger infection    Today 11/20/23, HD# 1  Patient seen and examined at bedside this AM.  Nursing at bedside as well stating blood pressures well manual repeat also low at 88/54. Patient asymptomatic at this time and reports that she frequently has low blood pressure at outpatient visits. She states her blood pressure is usually in the low 100s. Encouraged oral hydration. Patient denies chest pain, shortness of breath, nausea, vomiting. Complaining of some minimal hand pain. Patient denies any current suicidal ideation. Objective:     Vitals:    11/20/23 0638 11/20/23 0833 11/20/23 1120 11/20/23 1122   BP: (!) 98/46 (!) 88/54 (!) 91/49 91/50   BP Location: Left arm      Pulse: 91  89    Resp:       Temp:   98.4 °F (36.9 °C)    TempSrc:       SpO2:   94%      Temp:  [97.4 °F (36.3 °C)-98.4 °F (36.9 °C)] 98.4 °F (36.9 °C)  HR:  [64-92] 89  Resp:  [12-21] 17  BP: ()/(46-71) 91/50  Weight (last 2 days)       None            Intake/Output Summary (Last 24 hours) at 11/20/2023 1242  Last data filed at 11/20/2023 2148  Gross per 24 hour   Intake 2034.17 ml   Output --   Net 2034.17 ml     Invasive Devices       Peripheral Intravenous Line  Duration             Peripheral IV 11/20/23 Distal;Right;Upper;Ventral (anterior) Arm <1 day              Drain  Duration             Open Drain Anterior;Right Hand <1 day                      Physical Exam:     Physical Exam  Constitutional:       Appearance: Normal appearance. Cardiovascular:      Rate and Rhythm: Normal rate and regular rhythm. Pulses: Normal pulses. Heart sounds: Normal heart sounds. No murmur heard. No friction rub. No gallop. Pulmonary:      Effort: Pulmonary effort is normal.      Breath sounds: Normal breath sounds. No wheezing, rhonchi or rales. Abdominal:      General: Abdomen is flat. Bowel sounds are normal.      Palpations: Abdomen is soft. Musculoskeletal:      Comments: Right hand bandage clean dry intact   Neurological:      Mental Status: She is alert. Psychiatric:         Mood and Affect: Mood is anxious.            Labs:     CBC:  Results from last 7 days   Lab Units 11/20/23  0518 11/19/23  0022   WBC Thousand/uL 6.19 8.24   HEMOGLOBIN g/dL 10.1* 12.5   HEMATOCRIT % 31.0* 40.2   PLATELETS Thousands/uL 279 218   NEUTROS ABS Thousands/µL  --  5.54       CMP:  Results from last 7 days   Lab Units 11/20/23  0518 11/19/23  0022   POTASSIUM mmol/L 3.8 4.0   CHLORIDE mmol/L 106 102   CO2 mmol/L 24 23   BUN mg/dL 10 19   CREATININE mg/dL 0.58* 0.69   CALCIUM mg/dL 8.3* 9.1   AST U/L  --  17   ALT U/L  --  10   ALK PHOS U/L  --  76   EGFR ml/min/1.73sq m 102 96       Sepsis:  Results from last 7 days   Lab Units 11/20/23  0518   CRP mg/L 68.1*       Micro:  Lab Results   Component Value Date/Time    Gram Stain Result Rare Polys 11/19/2023 12:55 PM    Gram Stain Result No bacteria seen 11/19/2023 12:55 PM         Imaging:     No results found.       Medications:     Current Facility-Administered Medications   Medication Dose Route Frequency    acetaminophen (TYLENOL) tablet 650 mg  650 mg Oral Q4H PRN    cefTRIAXone (ROCEPHIN) 1,000 mg in dextrose 5 % 50 mL IVPB  1,000 mg Intravenous Q24H    enoxaparin (LOVENOX) subcutaneous injection 40 mg  40 mg Subcutaneous Daily    [START ON 11/21/2023] ferrous sulfate tablet 325 mg  325 mg Oral Daily With Breakfast    FLUoxetine (PROzac) capsule 40 mg  40 mg Oral BID    HYDROmorphone (DILAUDID) injection 0.2 mg  0.2 mg Intravenous Q2H PRN    lactated ringers infusion  125 mL/hr Intravenous Continuous    levothyroxine tablet 175 mcg  175 mcg Oral Daily    LORazepam (ATIVAN) tablet 0.5 mg  0.5 mg Oral Daily PRN    nystatin (MYCOSTATIN) powder   Topical BID    oxyCODONE (ROXICODONE) split tablet 2.5 mg  2.5 mg Oral Q4H PRN    Or    oxyCODONE (ROXICODONE) IR tablet 5 mg  5 mg Oral Q4H PRN    polyethylene glycol (MIRALAX) packet 17 g  17 g Oral Daily PRN    QUEtiapine (SEROquel) tablet 100 mg  100 mg Oral Daily    QUEtiapine (SEROquel) tablet 600 mg  600 mg Oral HS    vancomycin (VANCOCIN) IVPB (premix in dextrose) 750 mg 150 mL  750 mg Intravenous Sabino Cheatham MD  Family Medicine, PGY-1  12:42 PM 11/20/2023

## 2023-11-20 NOTE — PROGRESS NOTES
Chester Snowden is a 63 y.o. female who is currently ordered Vancomycin IV with management by the Pharmacy Consult service. Relevant clinical data and objective / subjective history reviewed. Vancomycin Assessment:  Indication and Goal AUC/Trough: Soft tissue (goal -600, trough >10), -600, trough >10  Clinical Status: stable  Micro:     Renal Function:  SCr: 487 mg/dL  CrCl: 14.3 mL/min  Renal replacement: Not on dialysis  Days of Therapy: 2  Current Dose: 750 mg IV q 12  Vancomycin Plan:  New Dosingm q12  Estimated AUC: 487 mcg*hr/mL  Estimated Trough: 14.3 mcg/mL  Next Level: am 11-21  Renal Function Monitoring: Daily BMP and UOP  Pharmacy will continue to follow closely for s/sx of nephrotoxicity, infusion reactions and appropriateness of therapy. BMP and CBC will be ordered per protocol. We will continue to follow the patient’s culture results and clinical progress daily.     Flory Barrientos, Pharmacist

## 2023-11-20 NOTE — PROGRESS NOTES
Aaliyah Vila is a 63 y.o. female who is currently ordered Vancomycin IV with management by the Pharmacy Consult service. Relevant clinical data and objective / subjective history reviewed. Vancomycin Assessment:  Indication and Goal AUC/Trough: Soft tissue (goal -600, trough >10), -600, trough >10  Clinical Status: stable  Micro:     Renal Function:  SCr: 0.58 mg/dL  CrCl: 91.2 mL/min  Renal replacement: Not on dialysis  Days of Therapy: 2  Current Dose: 1000 mg IV Q12H  Vancomycin Plan:  New Dosingm q12  Estimated AUC: 487 mcg*hr/mL  Estimated Trough: 14.3 mcg/mL  Next Level: am 11-21  Renal Function Monitoring: Daily BMP and UOP  Pharmacy will continue to follow closely for s/sx of nephrotoxicity, infusion reactions and appropriateness of therapy. BMP and CBC will be ordered per protocol. We will continue to follow the patient’s culture results and clinical progress daily.

## 2023-11-20 NOTE — ASSESSMENT & PLAN NOTE
Patient noted to be consistently hypotensive to 09X-63T systolic. Complaining of occasional lightheadedness when getting out of bed and walking to the bathroom. Clinically suspect hypotension could be due to poorly controled hypothyroidism.      Plan:  -Received bolus last night  -Encourage oral hydration  -Consider bolus if patient becomes symptomatic
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
## 2023-11-20 NOTE — PROGRESS NOTES
Progress Note - Orthopedics   Kyler Willoughby 63 y.o. female MRN: [id number]  Unit/Bed#: [facility] 612-01      Subjective:    63 y. o.female POD 1 right index finger I&D. No acute events, no new complaints. Pain well controlled.     Labs:  0   Lab Value Date/Time    HCT 40.2 11/19/2023 0022    HCT 37.5 11/11/2022 0610    HCT 38.2 08/02/2021 1642    HGB 12.5 11/19/2023 0022    HGB 12.5 11/11/2022 0610    HGB 12.6 08/02/2021 1642    WBC 8.24 11/19/2023 0022    WBC 7.96 11/11/2022 0610    WBC 4.10 (L) 08/02/2021 1642       Meds:    Current Facility-Administered Medications:     acetaminophen (TYLENOL) tablet 650 mg, 650 mg, Oral, Q4H PRN, Harvy Halsted Yext, DO    [START ON 11/20/2023] cefTRIAXone (ROCEPHIN) 1,000 mg in dextrose 5 % 50 mL IVPB, 1,000 mg, Intravenous, Q24H, Vinita Farias DO    [START ON 11/20/2023] enoxaparin (LOVENOX) subcutaneous injection 40 mg, 40 mg, Subcutaneous, Daily, Vinita Farias DO    [START ON 11/20/2023] FLUoxetine (PROzac) capsule 40 mg, 40 mg, Oral, BID, Vinita Farias, DO    HYDROmorphone (DILAUDID) injection 0.2 mg, 0.2 mg, Intravenous, Q2H PRN, Harvy Halsted Yext,     lactated ringers infusion, 125 mL/hr, Intravenous, Continuous, Herman Romero MD    [START ON 11/20/2023] levothyroxine tablet 175 mcg, 175 mcg, Oral, Daily, Vinita Farias DO    LORazepam (ATIVAN) tablet 0.5 mg, 0.5 mg, Oral, Daily PRN, Harvy Halsted Yext, , 0.5 mg at 11/19/23 2010    nystatin (MYCOSTATIN) powder, , Topical, BID, Harvy Halsted Yext, DO, Given at 11/19/23 1803    oxyCODONE (ROXICODONE) split tablet 2.5 mg, 2.5 mg, Oral, Q4H PRN **OR** oxyCODONE (ROXICODONE) IR tablet 5 mg, 5 mg, Oral, Q4H PRN, Harvy Halsted Yext, DO, 5 mg at 11/19/23 1742    polyethylene glycol (MIRALAX) packet 17 g, 17 g, Oral, Daily PRN, Harvy Halsted Yext, DO    [START ON 11/20/2023] QUEtiapine (SEROquel) tablet 100 mg, 100 mg, Oral, Daily, Vinita Vogtxt, DO    QUEtiapine (SEROquel) tablet 600 mg, 600 mg, Oral, HS, Vinita Vogtxt, DO    vancomycin (VANCOCIN) IVPB (premix in dextrose) 750 mg 150 mL, 750 mg, Intravenous, Q12H, Vinita Farias, DO, Last Rate: 150 mL/hr at 11/19/23 1849, 750 mg at 11/19/23 1849    Blood Culture:   No results found for: "BLOODCX"    Wound Culture:   No results found for: "WOUNDCULT"    Ins and Outs:  I/O last 24 hours: In: 400 [I.V.:400]  Out: -           Physical:  Vitals:    11/19/23 1838   BP: 113/68   Pulse:    Resp: 12   Temp:    SpO2:      Musculoskeletal: right Upper Extremity  Dressing c/d/i  TTP about index finger  Sensation intact to exposed digits     Assessment:    63 y. o.female POD 1 right index finger I&D. Patient doing well. Plan for dressing change tomorrow. F/u cultures.     Plan:  NWB right hand  Antibiotics per primary - recommend infectious disease consultation  Plan for wound check Tuesday  PT/OT  Pain control  DVT ppx - per primary   Rest of care per primary team  Dispo: Ortho will follow    Mani Chavez MD

## 2023-11-20 NOTE — UTILIZATION REVIEW
Initial Clinical Review    The patient was transferred to [address] [place] [address] on 11/18 from [facility], where care began on . 1 midnight has already been surpassed with active ongoing care. Observation 11/19 @ 1434 and changed to Inpatient on 11/20 @ 1431. Pt requiring continued stay for Finger infection, S/p OR with post of care, Iv antibiotics and pain control. Admission: Date/Time/Statement:   Admission Orders (From admission, onward)       Ordered        11/20/23 1431  Inpatient Admission  Once            11/19/23 1434  Place in Observation  Once                          Orders Placed This Encounter   Procedures    Inpatient Admission     Standing Status:   Standing     Number of Occurrences:   1     Order Specific Question:   Level of Care     Answer:   Med Surg [16]     Order Specific Question:   Estimated length of stay     Answer:   More than 2 Midnights     Order Specific Question:   Certification     Answer:   I certify that inpatient services are medically necessary for this patient for a duration of greater than two midnights. See H&P and MD Progress Notes for additional information about the patient's course of treatment. ED Arrival Information       Patient not seen in ED                         Initial Presentation: 63 y.o. female, Transfer from [facility] ED, presented there on 11/18 S/p Rat bite. Pt was bit by her pet rat on her L-2nd finger 4 days ago. There were lacerations/bleeding which overall resolved, but she developed edema, pain, and drainage from the finger. Pt was given  the Tdap booster and started on antibiotics. Pt was also having SI in ED. She follow with Psychiatry, [facility] where she is prescribed medications and get therapy.  She ran out of her medications including Seroquel and Prozac 4 days ago and since then, she stated "today I was feeling suicidal,  if I had my Seroquel I would taken enough to end it all." As patient was not medically cleared, she was not evaluated by the crisis team. Patient seen at bedside following debridement of finger. She endorses she has been in a bad mental state since running out of her medications and last saw her psychiatrist 5 months ago. She denies any current suicidal thoughts. PMH for Bipolar disorder, depression, hypothyroidism, hx of gastric bypass surgery. Admit Observation level of care for Finger infection and Suicidal ideation. Rash. Hand Surgery consult for OR washout. NPO. Continue Iv antibiotics. Psychiatry consult. Pain control. Resumed home psych meds of 600 mg quetiapine QHS, 100 mg quetiapine in AM and 40 mg fluoxetine BID. Nystatin powder to rash. On exam; Red itchy rash over lower abd, not new per pt.     11/19  Orthopedic cons; Right  index finger abscess, concern for possible flexor tenosynovitis. To OR urgently for right index finger irrigation and debridement. Continue Iv antibiotics. NWB RUE. Analgesics for pain. 11/19 OR - S/p Right - right index finger abscess and flexor sheath irrigation and debridement   Operative Findings:  Large soft tissue abscess on the volar index finger and radial palm. There was destruction of the flexor sheath and pulleys. Only the A1 pulley remained intact on inspection. Significant purulence around the flexor tendon with fibrinous material deep to it. Radial ulnar digital nerves and arteries intact. 11/20 Changed to Inpatient status  Progress notes; Wound check tomorrow 11/12. Continue Iv antibiotics and IVFs. F/u wound cultures. Finger foods for diet. 1:1 at bedside. Recheck B vitamin levels. Pain regimen. Per Orthopedic; POD #1. Plan for dressing change tomorrow. F/u cultures. NWB right hand. Continue Iv antibiotics. Plan for wound check Tuesday. Pain control. ID cons; Right index finger abscess with destruction of flexor sheath from rat bite on 11/14/23. S/p I&D on 11/19/23 by Ortho. Deep infection warranting IV antibiotics. Increase ceftriaxone to 2g qd and add metronidazole 500 mg q8hrs for anaerobic coverage. OR start Augmentin 3g q6hrs. D/c vancomycin. F/u anaerobic culture. Behavioral Health cons; Suicidal ideation and a finger infection after she has been bitten by her pet rat 4 days prior to her presentation. She states that she has not taken her medication for several days, but the reality she has not seen a psychiatrist since July according to the records. She stated when she does not take her medication she feels depressed. Dx: Bipolar disorder moderate episode depressed moderate. Continue medical management. D/c 1:1 Observation. Continue Seroquel 100 mg p.o. daily and 60 mg p.o. at bedtime. Continue p.o. of 40 mg p.o. twice daily. Continue lorazepam 0.5 mg p.o. daily as needed for anxiety. No intervention at this time. Date: 11/20    Day 3: Has surpassed a 2nd midnight with active treatments and services, which include continued on Iv antibiotics, post op care, f/u cultures.  Safe d/c plan    Vitals   Temperature Pulse Respirations Blood Pressure SpO2   11/19/23 1353 11/19/23 1353 11/19/23 1353 11/19/23 1353 11/19/23 1353   (!) 97.4 °F (36.3 °C) 91 13 (!) 87/51 94 %      Temp Source Heart Rate Source Patient Position - Orthostatic VS BP Location FiO2 (%)   11/19/23 1834 11/19/23 2157 11/19/23 1834 11/19/23 1834 --   Oral Monitor Lying Left arm       Pain Score       11/19/23 1152       8          Wt Readings from Last 1 Encounters:   11/19/23 63.2 kg (139 lb 6.4 oz)     Additional Vital Signs:   11/20/23 0833 -- -- -- 88/54 Abnormal   -- -- -- -- -- -- --   BP: Provider at bedside made aware at 11/20/23 0833 11/20/23 0638 -- 91 -- 98/46 Abnormal   -- -- -- -- -- -- Lying   BP: Family Medicine notified at 11/20/23 0638 11/20/23 06:31:07 -- 92 17 80/48 Abnormal  59 Abnormal  92 % -- -- -- -- --   11/19/23 21:57:23 98.2 °F (36.8 °C) 89 18 101/58 72 95 % -- -- None (Room air) -- Lying   11/19/23 18:38:48 -- -- 12 113/68 83 -- -- -- -- -- --   11/19/23 1834 97.8 °F (36.6 °C) 88 12 108/66 88 95 % -- -- None (Room air) -- Lying   11/19/23 1700 -- 74 12 106/62 85 95 % -- -- None (Room air) -- --   11/19/23 1630 98 °F (36.7 °C) 70 13 107/68 86 96 % -- -- None (Room air) -- --   11/19/23 1600 97.9 °F (36.6 °C) 70 12 100/70 81 94 % -- -- None (Room air) WDL      Pertinent Labs/Diagnostic Test Results:   11/19  XR right hand - No acute osseous abnormality.        Results from last 7 days   Lab Units 11/20/23 0518 11/19/23  0022   WBC Thousand/uL 6.19 8.24   HEMOGLOBIN g/dL 10.1* 12.5   HEMATOCRIT % 31.0* 40.2   PLATELETS Thousands/uL 279 218   NEUTROS ABS Thousands/µL  --  5.54         Results from last 7 days   Lab Units 11/20/23 0518 11/19/23  0022   SODIUM mmol/L 139 135   POTASSIUM mmol/L 3.8 4.0   CHLORIDE mmol/L 106 102   CO2 mmol/L 24 23   ANION GAP mmol/L 9 10   BUN mg/dL 10 19   CREATININE mg/dL 0.58* 0.69   EGFR ml/min/1.73sq m 102 96   CALCIUM mg/dL 8.3* 9.1     Results from last 7 days   Lab Units 11/19/23  0022   AST U/L 17   ALT U/L 10   ALK PHOS U/L 76   TOTAL PROTEIN g/dL 7.1   ALBUMIN g/dL 4.0   TOTAL BILIRUBIN mg/dL 0.40         Results from last 7 days   Lab Units 11/20/23 0518 11/19/23  0022   GLUCOSE RANDOM mg/dL 92 104       Results from last 7 days   Lab Units 11/20/23 0518   TSH 3RD GENERATON uIU/mL 24.269*       Results from last 7 days   Lab Units 11/19/23  1255   GRAM STAIN RESULT  Rare Polys  No bacteria seen       Past Medical History:   Diagnosis Date    Ankle fracture     Attention deficit disorder 11/1/2011    Bipolar 1 disorder ([address])     Depression     Depression 7/19/2021    Disease of thyroid gland     Foot fracture, left     Pneumonia 12/2008    Psychiatric disorder     Renal disorder     Tachycardia 12/2008     Present on Admission:   Bipolar disorder ([address])   Depression   Hypothyroidism      Admitting Diagnosis: Finger infection [L08.9]  Age/Sex: 63 y.o. female    Admission Orders:  Scheduled Medications:  [START ON 11/21/2023] cefTRIAXone, 2,000 mg, Intravenous, Q24H  enoxaparin, 40 mg, Subcutaneous, Daily  ergocalciferol, 50,000 Units, Oral, Weekly  [START ON 11/21/2023] ferrous sulfate, 325 mg, Oral, Daily With Breakfast  FLUoxetine, 40 mg, Oral, BID  levothyroxine, 175 mcg, Oral, Daily  metroNIDAZOLE, 500 mg, Intravenous, Q8H  nystatin, , Topical, BID  QUEtiapine, 100 mg, Oral, Daily  QUEtiapine, 600 mg, Oral, HS      Continuous IV Infusions:  lactated ringers, 125 mL/hr, Intravenous, Continuous      PRN Meds:  acetaminophen, 650 mg, Oral, Q4H PRN  HYDROmorphone, 0.2 mg, Intravenous, Q2H PRN  LORazepam, 0.5 mg, Oral, Daily PRN  oxyCODONE, 2.5 mg, Oral, Q4H PRN   Or  oxyCODONE, 5 mg, Oral, Q4H PRN  polyethylene glycol, 17 g, Oral, Daily PRN        IP CONSULT TO PSYCHIATRY  IP CONSULT TO ORTHOPEDIC SURGERY  IP CONSULT TO INFECTIOUS DISEASES  IP CONSULT TO PHARMACY    Network Utilization Review Department  ATTENTION: Please call with any questions or concerns to [phone] and carefully listen to the prompts so that you are directed to the right person. All voicemails are confidential.   For Discharge needs, contact Care Management DC Support Team at [phone] opt. 2  Send all requests for admission clinical reviews, approved or denied determinations and any other requests to dedicated fax number below belonging to the campus where the patient is receiving treatment.  List of dedicated fax numbers for the Facilities:  [place] DENIALS (Administrative/Medical Necessity) [phone]   DISCHARGE SUPPORT TEAM (NETWORK) [id number] Remi Broderick (Maternity/NICU/Pediatrics) [address] [place], [place] [phone]   [facility] [address] [phone]   [facility] [address] [address] [facility] [address] [facility]  [address] [phone]

## 2023-11-20 NOTE — PLAN OF CARE
Problem: SAFETY ADULT  Goal: Patient will remain free of falls  Description: INTERVENTIONS:  - Educate patient/family on patient safety including physical limitations  - Instruct patient to call for assistance with activity   - Consult OT/PT to assist with strengthening/mobility   - Keep Call bell within reach  - Keep bed low and locked with side rails adjusted as appropriate  - Keep care items and personal belongings within reach  - Initiate and maintain comfort rounds  - Make Fall Risk Sign visible to staff  - Offer Toileting every  Hours, in advance of need  - Initiate/Maintain alarm  - Obtain necessary fall risk management equipment:   - Apply yellow socks and bracelet for high fall risk patients  - Consider moving patient to room near nurses station  11/20/2023 0249 by Rosario Miller RN  Outcome: Progressing  11/20/2023 0248 by Rosario Miller RN  Outcome: Progressing  Goal: Maintain or return to baseline ADL function  Description: INTERVENTIONS:  -  Assess patient's ability to carry out ADLs; assess patient's baseline for ADL function and identify physical deficits which impact ability to perform ADLs (bathing, care of mouth/teeth, toileting, grooming, dressing, etc.)  - Assess/evaluate cause of self-care deficits   - Assess range of motion  - Assess patient's mobility; develop plan if impaired  - Assess patient's need for assistive devices and provide as appropriate  - Encourage maximum independence but intervene and supervise when necessary  - Involve family in performance of ADLs  - Assess for home care needs following discharge   - Consider OT consult to assist with ADL evaluation and planning for discharge  - Provide patient education as appropriate  11/20/2023 0249 by Rosario Miller RN  Outcome: Progressing  11/20/2023 0248 by Rosario Miller RN  Outcome: Progressing  Goal: Maintains/Returns to pre admission functional level  Description: INTERVENTIONS:  - Perform AM-PAC 6 Click Basic Mobility/ Daily Activity assessment daily.  - Set and communicate daily mobility goal to care team and patient/family/caregiver. - Collaborate with rehabilitation services on mobility goals if consulted  - Perform Range of Motion  times a day. - Reposition patient every  hours.   - Dangle patient  times a day  - Stand patient  times a day  - Ambulate patient  times a day  - Out of bed to chair times a day   - Out of bed for meals  times a day  - Out of bed for toileting  - Record patient progress and toleration of activity level   11/20/2023 0249 by Ravi Cruz RN  Outcome: Progressing  11/20/2023 0248 by Ravi Cruz RN  Outcome: Progressing     Problem: PAIN - ADULT  Goal: Verbalizes/displays adequate comfort level or baseline comfort level  Description: Interventions:  - Encourage patient to monitor pain and request assistance  - Assess pain using appropriate pain scale  - Administer analgesics based on type and severity of pain and evaluate response  - Implement non-pharmacological measures as appropriate and evaluate response  - Consider cultural and social influences on pain and pain management  - Notify physician/advanced practitioner if interventions unsuccessful or patient reports new pain  11/20/2023 0249 by Ravi Cruz RN  Outcome: Progressing  11/20/2023 0248 by Ravi Cruz RN  Outcome: Progressing

## 2023-11-21 LAB
ANION GAP SERPL CALCULATED.3IONS-SCNC: 7 MMOL/L
ANISOCYTOSIS BLD QL SMEAR: PRESENT
BASOPHILS # BLD MANUAL: 0 THOUSAND/UL (ref 0–0.1)
BASOPHILS NFR MAR MANUAL: 0 % (ref 0–1)
BUN SERPL-MCNC: 8 MG/DL (ref 5–25)
CALCIUM SERPL-MCNC: 7.8 MG/DL (ref 8.4–10.2)
CHLORIDE SERPL-SCNC: 110 MMOL/L (ref 96–108)
CO2 SERPL-SCNC: 25 MMOL/L (ref 21–32)
CREAT SERPL-MCNC: 0.59 MG/DL (ref 0.6–1.3)
EOSINOPHIL # BLD MANUAL: 0.37 THOUSAND/UL (ref 0–0.4)
EOSINOPHIL NFR BLD MANUAL: 7 % (ref 0–6)
ERYTHROCYTE [DISTWIDTH] IN BLOOD BY AUTOMATED COUNT: 12.7 % (ref 11.6–15.1)
GFR SERPL CREATININE-BSD FRML MDRD: 102 ML/MIN/1.73SQ M
GLUCOSE SERPL-MCNC: 87 MG/DL (ref 65–140)
HCT VFR BLD AUTO: 29.8 % (ref 34.8–46.1)
HGB BLD-MCNC: 9.8 G/DL (ref 11.5–15.4)
LYMPHOCYTES # BLD AUTO: 2.56 THOUSAND/UL (ref 0.6–4.47)
LYMPHOCYTES # BLD AUTO: 31 % (ref 14–44)
MCH RBC QN AUTO: 30.8 PG (ref 26.8–34.3)
MCHC RBC AUTO-ENTMCNC: 32.9 G/DL (ref 31.4–37.4)
MCV RBC AUTO: 94 FL (ref 82–98)
METAMYELOCYTES NFR BLD MANUAL: 1 % (ref 0–1)
MONOCYTES # BLD AUTO: 0.42 THOUSAND/UL (ref 0–1.22)
MONOCYTES NFR BLD: 8 % (ref 4–12)
MYELOCYTES NFR BLD MANUAL: 3 % (ref 0–1)
NEUTROPHILS # BLD MANUAL: 1.67 THOUSAND/UL (ref 1.85–7.62)
NEUTS SEG NFR BLD AUTO: 32 % (ref 43–75)
PLATELET # BLD AUTO: 317 THOUSANDS/UL (ref 149–390)
PLATELET BLD QL SMEAR: ADEQUATE
PMV BLD AUTO: 9.6 FL (ref 8.9–12.7)
POTASSIUM SERPL-SCNC: 3.8 MMOL/L (ref 3.5–5.3)
RBC # BLD AUTO: 3.18 MILLION/UL (ref 3.81–5.12)
RBC MORPH BLD: PRESENT
SODIUM SERPL-SCNC: 142 MMOL/L (ref 135–147)
VANCOMYCIN SERPL-MCNC: 13.9 UG/ML (ref 10–20)
VARIANT LYMPHS # BLD AUTO: 18 %
WBC # BLD AUTO: 5.22 THOUSAND/UL (ref 4.31–10.16)

## 2023-11-21 PROCEDURE — 99024 POSTOP FOLLOW-UP VISIT: CPT | Performed by: ORTHOPAEDIC SURGERY

## 2023-11-21 PROCEDURE — 80048 BASIC METABOLIC PNL TOTAL CA: CPT

## 2023-11-21 PROCEDURE — 85027 COMPLETE CBC AUTOMATED: CPT

## 2023-11-21 PROCEDURE — 99232 SBSQ HOSP IP/OBS MODERATE 35: CPT | Performed by: INTERNAL MEDICINE

## 2023-11-21 PROCEDURE — 99232 SBSQ HOSP IP/OBS MODERATE 35: CPT | Performed by: FAMILY MEDICINE

## 2023-11-21 PROCEDURE — 80202 ASSAY OF VANCOMYCIN: CPT | Performed by: FAMILY MEDICINE

## 2023-11-21 PROCEDURE — 85007 BL SMEAR W/DIFF WBC COUNT: CPT

## 2023-11-21 RX ADMIN — NYSTATIN: 100000 POWDER TOPICAL at 09:20

## 2023-11-21 RX ADMIN — ENOXAPARIN SODIUM 40 MG: 40 INJECTION SUBCUTANEOUS at 09:18

## 2023-11-21 RX ADMIN — VANCOMYCIN HYDROCHLORIDE 1000 MG: 1 INJECTION, SOLUTION INTRAVENOUS at 05:55

## 2023-11-21 RX ADMIN — CEFTRIAXONE 2000 MG: 1 INJECTION, POWDER, FOR SOLUTION INTRAMUSCULAR; INTRAVENOUS at 01:00

## 2023-11-21 RX ADMIN — CEFTRIAXONE 2000 MG: 1 INJECTION, POWDER, FOR SOLUTION INTRAMUSCULAR; INTRAVENOUS at 23:35

## 2023-11-21 RX ADMIN — HYDROMORPHONE HYDROCHLORIDE 0.2 MG: 1 INJECTION, SOLUTION INTRAMUSCULAR; INTRAVENOUS; SUBCUTANEOUS at 11:14

## 2023-11-21 RX ADMIN — FLUOXETINE 40 MG: 20 CAPSULE ORAL at 17:34

## 2023-11-21 RX ADMIN — OXYCODONE HYDROCHLORIDE 5 MG: 5 TABLET ORAL at 17:34

## 2023-11-21 RX ADMIN — OXYCODONE HYDROCHLORIDE 5 MG: 5 TABLET ORAL at 09:17

## 2023-11-21 RX ADMIN — LEVOTHYROXINE SODIUM 175 MCG: 125 TABLET ORAL at 09:17

## 2023-11-21 RX ADMIN — NYSTATIN: 100000 POWDER TOPICAL at 17:25

## 2023-11-21 RX ADMIN — HYDROMORPHONE HYDROCHLORIDE 0.2 MG: 1 INJECTION, SOLUTION INTRAMUSCULAR; INTRAVENOUS; SUBCUTANEOUS at 19:54

## 2023-11-21 RX ADMIN — QUETIAPINE FUMARATE 100 MG: 100 TABLET ORAL at 09:17

## 2023-11-21 RX ADMIN — VANCOMYCIN HYDROCHLORIDE 1000 MG: 1 INJECTION, SOLUTION INTRAVENOUS at 18:43

## 2023-11-21 RX ADMIN — FLUOXETINE 40 MG: 20 CAPSULE ORAL at 09:17

## 2023-11-21 RX ADMIN — QUETIAPINE FUMARATE 600 MG: 300 TABLET ORAL at 22:14

## 2023-11-21 RX ADMIN — LORAZEPAM 0.5 MG: 0.5 TABLET ORAL at 22:14

## 2023-11-21 RX ADMIN — OXYCODONE HYDROCHLORIDE 5 MG: 5 TABLET ORAL at 22:14

## 2023-11-21 RX ADMIN — FERROUS SULFATE TAB 325 MG (65 MG ELEMENTAL FE) 325 MG: 325 (65 FE) TAB at 09:17

## 2023-11-21 NOTE — PROGRESS NOTES
Progress Notes - Family Medicine Residency, Reuben Feldman 1965, 63 y.o. female. MRN: [id number]  Unit/Bed#: [facility] 043-53 Encounter: [id number]  Primary Care Provider: Donis Hyatt PA-C      Admission Date: 11/19/2023 1150  Length of Stay: 2 days  Code Status:  Level 1 - Full Code  Disposition:   Consult:   IP CONSULT TO PSYCHIATRY  IP CONSULT TO ORTHOPEDIC SURGERY  IP CONSULT TO INFECTIOUS DISEASES  IP CONSULT TO PHARMACY  IP CONSULT TO PHARMACY         Assessment/Plan:      Plans discussed with [facility] team and finalization is pending attending physician attestation. Please, call  for any clarification. * Finger infection  Assessment & Plan  Patient had been bitten by her pet rat 4 days prior to presentation. Since then, she developed, swelling, pain and drainage from the Left 2nd finger. ED management:  - Vitals remain stable and CBC, CMP are WNL  - Patient was given the Tdap vaccine and started on Vancomycin and Ceftriaxone  - Discussed with Hand surgeon Dr. Harjinder Engle, recommend transfer for OR washout    Plan:  POD 2 from washout by ortho  Wound check today by ortho  Will start soaks if cleared by ortho  Continue Vancomycin and Ceftriaxone  F/u wound cultures  ID consult, appreciate recommendations   Pain regimen      Suicidal ideation  Assessment & Plan  History of chronic bipolar disorder, depression on Prozac 40mg daily, Seroquel 300mg qhs, Ativan 0.5mg PRN  Unable to view PDMP  Per chart review, was following psychiatry and was prescribed medications through [facility], where she also received therapy. Ran out of her medications 4 days ago. Expressed active plan for SI while in the ED. Told ED provider "today I was feeling suicidal, if I had my Seroquel I would taken enough to end it all."   Could not be evaluated by crisis as she was not medically cleared.  After being evaluated following procedure, patient states she has just been feeling unlike herself since being off of her psych medications. She denies any current suicidal ideation. Plan:  Cleared by psych for return home on home psych medications with outpatient followup  Resumed regular diet  Resumed home psych meds of 600 mg quetiapine QHS, 100 mg quetiapine in AM and 40 mg fluoxetine BID and lorazapam 0.5 daily PRN  1:1 discontinued, cleared by psych    Depression  Assessment & Plan  See A/P under SI    Bipolar disorder ([address])  Assessment & Plan  See A/P under SI    Idiopathic hypotension  Assessment & Plan  Patient noted to be consistently hypotensive to 59R-09P systolic. Complaining of occasional lightheadedness when getting out of bed and walking to the bathroom. Clinically suspect hypotension could be due to poorly controled hypothyroidism. Plan:  -Received bolus last night  -Encourage oral hydration  -Consider bolus if patient becomes symptomatic    Hypothyroidism  Assessment & Plan  Patient states she has thyroid medication at home but has not taken it in 1.5-2 weeks. Thyroid    Lab Results   Component Value Date    [id number] 24.269 (H) 11/20/2023    GSW1GKIMXMDW 0.661 10/26/2021    [id number] 134.000 (H) 08/02/2021    FREET4 0.65 (L) 08/02/2021        Plan:   - Elevated TSH likely result of medication non-adherence  - Continue levothyroxine 175mcg daily for now  - Recheck TSH in 6 weeks outpatient and titrate levothyroxine dose accordingly    Rash  Assessment & Plan  Patient has red itchy rash over lower abdomen. States this rash is not new. Reports it started after she developed a skin fold after losing weight following bariatric surgery. Area appears red and irritated on exam. Patient has been struggling with frequent infections of this area for years, states often there is puss present and patient endorses she has required treatment for this in the past. Patient frustrated and requesting plastic surgery referral for evaluation after discharge.      Plan:  -nystatin powder ordered, rash improving  -plastic surgery referral outpatient      History of Arnav-en-Y gastric bypass  Assessment & Plan  Performed in 2015 at [facility] with Dr. Hildreth Gottron. Dharmesh Wei   Avoid NSAIDs  B-vitamin levels last checked in 2021  Vitamin D level low at 13    Plan:  Recheck B vitamin levels  We will consider resuming home supplements if levels low  Vitamin D repletion          Diet: Diet Regular; Regular House    VTE Pharm PPX: Enoxaparin (Lovenox)  VTE Mech PPX: sequential compression device      Subjective:   63 y.o. female admitted 11/19/2023 for Finger infection    Today 11/21/23, HD# 2    Patient continued to have episodes of hypotension overnight, received fluid bolus. Patient states she feels well this a.m, complaining of right hand pain but otherwise no complaints. Discussed that we will continue with broad spectrum antibiotics while awaiting cultures. Informed patient that she has been cleared by psych and will be sent home with supplies of her home medications and will be advised [facility]. Patient demonstrates understanding and agreement with treatment plan. Denies chest pain, shortness of breath, abdominal pain, leg swelling. Objective:     Vitals:    11/20/23 2110 11/21/23 0202 11/21/23 0603 11/21/23 0604   BP: (!) 90/44 (!) 88/49 92/57 98/62   BP Location:       Pulse:  77 70    Resp:       Temp:       TempSrc:       SpO2:  91% 90%      Temp:  [98.4 °F (36.9 °C)] 98.4 °F (36.9 °C)  HR:  [70-89] 70  BP: (82-98)/(40-62) 98/62  Weight (last 2 days)       None            Intake/Output Summary (Last 24 hours) at 11/21/2023 1038  Last data filed at 11/21/2023 0655  Gross per 24 hour   Intake 610 ml   Output --   Net 610 ml     Invasive Devices       Peripheral Intravenous Line  Duration             Peripheral IV 11/20/23 Distal;Right;Upper;Ventral (anterior) Arm 1 day    Peripheral IV 11/20/23 Dorsal (posterior); Left Hand <1 day              Drain  Duration             Open Drain Anterior;Right Hand 1 day Physical Exam:     Physical Exam  Constitutional:       Appearance: Normal appearance. Cardiovascular:      Rate and Rhythm: Normal rate and regular rhythm. Pulses: Normal pulses. Heart sounds: Normal heart sounds. No murmur heard. No friction rub. No gallop. Pulmonary:      Effort: Pulmonary effort is normal.      Breath sounds: Normal breath sounds. No wheezing, rhonchi or rales. Abdominal:      General: Abdomen is flat. Bowel sounds are normal.      Palpations: Abdomen is soft. Musculoskeletal:      Comments: Right hand bandage, clean dry intact   Skin:     General: Skin is warm and dry. Capillary Refill: Capillary refill takes less than 2 seconds. Neurological:      Mental Status: She is alert. Psychiatric:         Mood and Affect: Mood normal.         Behavior: Behavior normal.           Labs:     CBC:  Results from last 7 days   Lab Units 11/21/23  0508 11/20/23  0518 11/19/23  0022   WBC Thousand/uL 5.22 6.19 8.24   HEMOGLOBIN g/dL 9.8* 10.1* 12.5   HEMATOCRIT % 29.8* 31.0* 40.2   PLATELETS Thousands/uL 317 279 218   NEUTROS ABS Thousands/µL  --   --  5.54       CMP:  Results from last 7 days   Lab Units 11/21/23  0508 11/20/23  0518 11/19/23  0022   POTASSIUM mmol/L 3.8 3.8 4.0   CHLORIDE mmol/L 110* 106 102   CO2 mmol/L 25 24 23   BUN mg/dL 8 10 19   CREATININE mg/dL 0.59* 0.58* 0.69   CALCIUM mg/dL 7.8* 8.3* 9.1   AST U/L  --   --  17   ALT U/L  --   --  10   ALK PHOS U/L  --   --  76   EGFR ml/min/1.73sq m 102 102 96       Sepsis:  Results from last 7 days   Lab Units 11/20/23  0518   CRP mg/L 68.1*       Micro:  Lab Results   Component Value Date/Time    Gram Stain Result Rare Polys 11/19/2023 12:55 PM    Gram Stain Result No bacteria seen 11/19/2023 12:55 PM         Imaging:     XR hand 3+ vw right    Result Date: 11/20/2023  Impression: No acute osseous abnormality.  Workstation performed: [id number]         Medications:     Current Facility-Administered Medications   Medication Dose Route Frequency    acetaminophen (TYLENOL) tablet 650 mg  650 mg Oral Q4H PRN    cefTRIAXone (ROCEPHIN) 2,000 mg in dextrose 5 % 50 mL IVPB  2,000 mg Intravenous Q24H    enoxaparin (LOVENOX) subcutaneous injection 40 mg  40 mg Subcutaneous Daily    ergocalciferol (VITAMIN D2) capsule 50,000 Units  50,000 Units Oral Weekly    ferrous sulfate tablet 325 mg  325 mg Oral Daily With Breakfast    FLUoxetine (PROzac) capsule 40 mg  40 mg Oral BID    HYDROmorphone (DILAUDID) injection 0.2 mg  0.2 mg Intravenous Q2H PRN    levothyroxine tablet 175 mcg  175 mcg Oral Daily    LORazepam (ATIVAN) tablet 0.5 mg  0.5 mg Oral Daily PRN    nystatin (MYCOSTATIN) powder   Topical BID    oxyCODONE (ROXICODONE) split tablet 2.5 mg  2.5 mg Oral Q4H PRN    Or    oxyCODONE (ROXICODONE) IR tablet 5 mg  5 mg Oral Q4H PRN    polyethylene glycol (MIRALAX) packet 17 g  17 g Oral Daily PRN    QUEtiapine (SEROquel) tablet 100 mg  100 mg Oral Daily    QUEtiapine (SEROquel) tablet 600 mg  600 mg Oral HS    vancomycin (VANCOCIN) IVPB (premix in dextrose) 1,000 mg 200 mL  1,000 mg Intravenous Q12H                   Mushtaq Rodrigues MD  Family Medicine, PGY-1  10:38 AM 11/21/2023

## 2023-11-21 NOTE — PROGRESS NOTES
Progress Note - Infectious Disease   Wilmasheridan Willoughby 63 y.o. female MRN: [id number]  [facility] Encounter: [id number]      A/P:  1. Right index finger abscess and flexor tenosynovitis. Status post OR I&D 11/19 with significant purulence noted down to flexor sheath and level of DIP joint but not in the joint. No bacteria on GS. 2.  Rat bite. Complicated by #1.  3.  Bipolar disorder. Continue vancomycin and ceftriaxone for now final OR cultures. Vancomycin dosing per pharmacy. If no growth, will ask micro lab to hold for prolonged incubation. Antibiotic  Vancomycin/Ceftriaxone   POD2    I discussed above plan with orthopedic surgery. Subjective:  Finger looking better per ortho. No fevers. Still has pain. Objective:  Vitals:  HR:  [70-77] 70  BP: (82-98)/(40-62) 98/62  SpO2:  [90 %-93 %] 90 %  No data recorded. Current: Temperature: 98.4 °F (36.9 °C)    Physical Exam:   General:  No acute distress  HEENT: AT/NC  Psychiatric:  Awake and alert  Pulmonary:  Normal respiratory excursion without accessory muscle use  Abdomen:  Soft, nontender  Extremities:  No edema  Finger dressing intact  Skin:  No rashes  Neuro: moves all exts    Lab Results:  I have personally reviewed pertinent labs.   Results from last 7 days   Lab Units 11/21/23  0508 11/20/23  0518 11/19/23  0022   POTASSIUM mmol/L 3.8 3.8 4.0   CHLORIDE mmol/L 110* 106 102   CO2 mmol/L 25 24 23   BUN mg/dL 8 10 19   CREATININE mg/dL 0.59* 0.58* 0.69   EGFR ml/min/1.73sq m 102 102 96   CALCIUM mg/dL 7.8* 8.3* 9.1   AST U/L  --   --  17   ALT U/L  --   --  10   ALK PHOS U/L  --   --  76     Results from last 7 days   Lab Units 11/21/23  0508 11/20/23  0518 11/19/23  0022   WBC Thousand/uL 5.22 6.19 8.24   HEMOGLOBIN g/dL 9.8* 10.1* 12.5   PLATELETS Thousands/uL 317 279 218     Results from last 7 days   Lab Units 11/19/23  1255   GRAM STAIN RESULT  Rare Polys  No bacteria seen       Imaging Studies:   I have personally reviewed pertinent imaging study reports and images in PACS. EKG, Pathology, and Other Studies:   I have personally reviewed pertinent reports.

## 2023-11-21 NOTE — PROGRESS NOTES
Kathleen Greer is a 63 y.o. female who is currently ordered Vancomycin IV with management by the Pharmacy Consult service. Relevant clinical data and objective / subjective history reviewed. Vancomycin Assessment:  Indication and Goal AUC/Trough: Bone/joint infection (goal -600, trough >10), -600, trough >10  Clinical Status: stable  Micro:   Tissue culture & gram stain-no growth & rare polys  Renal Function:  SCr: 0.59 mg/dL  CrCl: 89.7 mL/min  Renal replacement: Not on dialysis  Days of Therapy: 3  Current Dose: 1000 mg IV Q12H  Vancomycin Plan:   Dosingm q12  Estimated AUC: 543 mcg*hr/mL  Estimated Trough: 16.6 mcg/mL  Next Level: random AM draw   Renal Function Monitoring: Daily BMP and [place] will continue to follow closely for s/sx of nephrotoxicity, infusion reactions and appropriateness of therapy. BMP and CBC will be ordered per protocol. We will continue to follow the patient’s culture results and clinical progress daily.     Carlyn Merlin, Pharmacist

## 2023-11-21 NOTE — PROGRESS NOTES
Progress Note - Orthopedics   Melissa Willoughby 63 y.o. female MRN: [id number]  Unit/Bed#: [facility] 612-01      Subjective:    63 y. o.female POD 2 right index finger I&D. No acute events, no new complaints. Pain well controlled. Minimally improved compared to prior to surgery.     Labs:  0   Lab Value Date/Time    HCT 29.8 (L) 11/21/2023 0508    HCT 31.0 (L) 11/20/2023 0518    HCT 40.2 11/19/2023 0022    HGB 9.8 (L) 11/21/2023 0508    HGB 10.1 (L) 11/20/2023 0518    HGB 12.5 11/19/2023 0022    WBC 5.22 11/21/2023 0508    WBC 6.19 11/20/2023 0518    WBC 8.24 11/19/2023 0022    ESR 42 (H) 11/20/2023 0518    CRP 68.1 (H) 11/20/2023 0518       Meds:    Current Facility-Administered Medications:     acetaminophen (TYLENOL) tablet 650 mg, 650 mg, Oral, Q4H PRN, Clemente Vogtxt, DO    cefTRIAXone (ROCEPHIN) 2,000 mg in dextrose 5 % 50 mL IVPB, 2,000 mg, Intravenous, Q24H, Ora Joseph MD, Last Rate: 100 mL/hr at 11/21/23 0100, 2,000 mg at 11/21/23 0100    enoxaparin (LOVENOX) subcutaneous injection 40 mg, 40 mg, Subcutaneous, Daily, Vinita Farias, DO, 40 mg at 11/20/23 2099    ergocalciferol (VITAMIN D2) capsule 50,000 Units, 50,000 Units, Oral, Weekly, Ora Joseph MD, 50,000 Units at 11/20/23 1711    ferrous sulfate tablet 325 mg, 325 mg, Oral, Daily With Breakfast, Ora Joseph MD    FLUoxetine (PROzac) capsule 40 mg, 40 mg, Oral, BID, Vinita Farias, DO, 40 mg at 11/20/23 1711    HYDROmorphone (DILAUDID) injection 0.2 mg, 0.2 mg, Intravenous, Q2H PRN, Clemente Needs Yext, DO, 0.2 mg at 11/20/23 1432    levothyroxine tablet 175 mcg, 175 mcg, Oral, Daily, Clemente Needs Yext, DO, 175 mcg at 11/20/23 5952    LORazepam (ATIVAN) tablet 0.5 mg, 0.5 mg, Oral, Daily PRN, Clemente Needs Yext, DO, 0.5 mg at 11/19/23 2010    nystatin (MYCOSTATIN) powder, , Topical, BID, Clemente Needs Yext, DO, Given at 11/20/23 0494    oxyCODONE (ROXICODONE) split tablet 2.5 mg, 2.5 mg, Oral, Q4H PRN **OR** oxyCODONE (ROXICODONE) IR tablet 5 mg, 5 mg, Oral, Q4H PRN, Gordon Deaner Yext, DO, 5 mg at 11/20/23 2115    polyethylene glycol (MIRALAX) packet 17 g, 17 g, Oral, Daily PRN, Gordon Deaner Yext, DO    QUEtiapine (SEROquel) tablet 100 mg, 100 mg, Oral, Daily, Vinita Martha Yext, DO, 100 mg at 11/20/23 1716    QUEtiapine (SEROquel) tablet 600 mg, 600 mg, Oral, HS, Vinita Martha Yext, DO, 600 mg at 11/20/23 2115    vancomycin (VANCOCIN) IVPB (premix in dextrose) 1,000 mg 200 mL, 1,000 mg, Intravenous, Q12H, Vika Aldea, DO, Last Rate: 200 mL/hr at 11/20/23 1715, 1,000 mg at 11/20/23 1715    Blood Culture:   No results found for: "BLOODCX"    Wound Culture:   No results found for: "WOUNDCULT"    Ins and Outs:  I/O last 24 hours: In: 1634.2 [P.O.:480; I.V.:954.2; IV Piggyback:200]  Out: -           Physical:  Vitals:    11/21/23 0202   BP: (!) 88/49   Pulse: 77   Resp:    Temp:    SpO2: 91%     Musculoskeletal: right Upper Extremity  Dressing taken down, surgical incision inspected  Incision intact, drain removed  No expressible drainage  TTP about index finger  Diffuse swelling to index finger  Sensation intact throughout all digits  ROM index finger MCP, PIP, DIP limited by swelling  Digits warm and well perfused    Assessment:    63 y. o.female POD 2 right index finger I&D. Patient doing well. Incision site without drainage, drain removed. Cultures NGTD. Will continue to monitor on IV antibiotics.     Plan:  NWB right hand  Antibiotics per primary - recommend infectious disease consultation  PT/OT  Pain control  DVT ppx - per primary   Rest of care per primary team  Dispo: Ortho will follow    Marianna Jackson MD

## 2023-11-21 NOTE — PROGRESS NOTES
Progress Note - Orthopedics   Ayo Willoughby 63 y.o. female MRN: [id number]  Unit/Bed#: [facility] 612-01      Subjective:    63 y. o.female POD 3 right index finger I&D. No acute events, no new complaints. Pain well controlled. She feels improved compared to yesterday.     Labs:  0   Lab Value Date/Time    HCT 29.8 (L) 11/21/2023 0508    HCT 31.0 (L) 11/20/2023 0518    HCT 40.2 11/19/2023 0022    HGB 9.8 (L) 11/21/2023 0508    HGB 10.1 (L) 11/20/2023 0518    HGB 12.5 11/19/2023 0022    WBC 5.22 11/21/2023 0508    WBC 6.19 11/20/2023 0518    WBC 8.24 11/19/2023 0022    ESR 42 (H) 11/20/2023 0518    CRP 68.1 (H) 11/20/2023 0518       Meds:    Current Facility-Administered Medications:     acetaminophen (TYLENOL) tablet 650 mg, 650 mg, Oral, Q4H PRN, Mikki Farias DO    cefTRIAXone (ROCEPHIN) 2,000 mg in dextrose 5 % 50 mL IVPB, 2,000 mg, Intravenous, Q24H, Ora Hebert MD, Stopped at 11/21/23 0130    enoxaparin (LOVENOX) subcutaneous injection 40 mg, 40 mg, Subcutaneous, Daily, Vinita Farias DO, 40 mg at 11/21/23 6352    ergocalciferol (VITAMIN D2) capsule 50,000 Units, 50,000 Units, Oral, Weekly, Ora Hebert MD, 50,000 Units at 11/20/23 1711    ferrous sulfate tablet 325 mg, 325 mg, Oral, Daily With Breakfast, Ora Hebert MD, 325 mg at 11/21/23 0917    FLUoxetine (PROzac) capsule 40 mg, 40 mg, Oral, BID, Vinita Farias DO, 40 mg at 11/21/23 2685    HYDROmorphone (DILAUDID) injection 0.2 mg, 0.2 mg, Intravenous, Q2H PRN, Mikki Farias DO, 0.2 mg at 11/21/23 1114    levothyroxine tablet 175 mcg, 175 mcg, Oral, Daily, Mikki Farias, DO, 175 mcg at 11/21/23 0917    LORazepam (ATIVAN) tablet 0.5 mg, 0.5 mg, Oral, Daily PRN, Mikki Farias, DO, 0.5 mg at 11/19/23 2010    nystatin (MYCOSTATIN) powder, , Topical, BID, Mikki Farias DO, Given at 11/21/23 0920    oxyCODONE (ROXICODONE) split tablet 2.5 mg, 2.5 mg, Oral, Q4H PRN **OR** oxyCODONE (ROXICODONE) IR tablet 5 mg, 5 mg, Oral, Q4H PRN, Carmenza Howard Martha Yext, DO, 5 mg at 11/21/23 0917    polyethylene glycol (MIRALAX) packet 17 g, 17 g, Oral, Daily PRN, Davion Pinatner Yext, DO    QUEtiapine (SEROquel) tablet 100 mg, 100 mg, Oral, Daily, Vinita Martha Yext, DO, 100 mg at 11/21/23 0917    QUEtiapine (SEROquel) tablet 600 mg, 600 mg, Oral, HS, Vinita Martha Yext, DO, 600 mg at 11/20/23 2115    vancomycin (VANCOCIN) IVPB (premix in dextrose) 1,000 mg 200 mL, 1,000 mg, Intravenous, Q12H, Vika Aldea, DO, Stopped at 11/21/23 0655    Blood Culture:   No results found for: "BLOODCX"    Wound Culture:   No results found for: "WOUNDCULT"    Ins and Outs:  I/O last 24 hours: In: 790 [P.O.:540; IV Piggyback:250]  Out: -           Physical:  Vitals:    11/21/23 1430   BP: (!) 89/50   Pulse: 79   Resp: 17   Temp: 98.3 °F (36.8 °C)   SpO2: 96%     Musculoskeletal: right Upper Extremity  Incision intact, sutures in place, no erythema or induration or expressible drainage  TTP harpreet incisionally  Diffuse swelling to index finger  SILT throughout, two point discrimination intact  ROM index finger MCP, PIP, DIP limited by pain  Digits warm and well perfused    Assessment:    63 y. o.female POD 3 right index finger I&D. Patient doing well. Incision site without drainage, drain removed. Cultures NGTD. Will continue to monitor on IV antibiotics.  TID soapy soaks    Plan:  NWB right hand  Antibiotics per primary - recommend infectious disease consultation  PT/OT  Pain control  DVT ppx - per primary   Rest of care per primary team  Dispo: Ortho will follow    Layne Arteaga MD

## 2023-11-21 NOTE — ANESTHESIA PROCEDURE NOTES
Anesthesia Notable Event    Date/Time: 11/19/2023 6:00 PM    Performed by: Chet Bishop MD  Authorized by: Chet Bishop MD

## 2023-11-22 VITALS
OXYGEN SATURATION: 96 % | RESPIRATION RATE: 16 BRPM | SYSTOLIC BLOOD PRESSURE: 101 MMHG | HEART RATE: 73 BPM | TEMPERATURE: 98.4 F | DIASTOLIC BLOOD PRESSURE: 61 MMHG

## 2023-11-22 LAB
ANION GAP SERPL CALCULATED.3IONS-SCNC: 10 MMOL/L
BUN SERPL-MCNC: 7 MG/DL (ref 5–25)
CALCIUM SERPL-MCNC: 8.1 MG/DL (ref 8.4–10.2)
CHLORIDE SERPL-SCNC: 107 MMOL/L (ref 96–108)
CO2 SERPL-SCNC: 24 MMOL/L (ref 21–32)
CREAT SERPL-MCNC: 0.72 MG/DL (ref 0.6–1.3)
CRP SERPL QL: 23.6 MG/L
ERYTHROCYTE [DISTWIDTH] IN BLOOD BY AUTOMATED COUNT: 12.9 % (ref 11.6–15.1)
GFR SERPL CREATININE-BSD FRML MDRD: 93 ML/MIN/1.73SQ M
GLUCOSE SERPL-MCNC: 109 MG/DL (ref 65–140)
HCT VFR BLD AUTO: 31.3 % (ref 34.8–46.1)
HGB BLD-MCNC: 10 G/DL (ref 11.5–15.4)
MCH RBC QN AUTO: 29.8 PG (ref 26.8–34.3)
MCHC RBC AUTO-ENTMCNC: 31.9 G/DL (ref 31.4–37.4)
MCV RBC AUTO: 93 FL (ref 82–98)
PLATELET # BLD AUTO: 362 THOUSANDS/UL (ref 149–390)
PMV BLD AUTO: 9.7 FL (ref 8.9–12.7)
POTASSIUM SERPL-SCNC: 3.9 MMOL/L (ref 3.5–5.3)
RBC # BLD AUTO: 3.36 MILLION/UL (ref 3.81–5.12)
SODIUM SERPL-SCNC: 141 MMOL/L (ref 135–147)
WBC # BLD AUTO: 7.24 THOUSAND/UL (ref 4.31–10.16)

## 2023-11-22 PROCEDURE — 99232 SBSQ HOSP IP/OBS MODERATE 35: CPT | Performed by: FAMILY MEDICINE

## 2023-11-22 PROCEDURE — 86140 C-REACTIVE PROTEIN: CPT

## 2023-11-22 PROCEDURE — 85027 COMPLETE CBC AUTOMATED: CPT

## 2023-11-22 PROCEDURE — 99232 SBSQ HOSP IP/OBS MODERATE 35: CPT | Performed by: INTERNAL MEDICINE

## 2023-11-22 PROCEDURE — NC001 PR NO CHARGE: Performed by: ORTHOPAEDIC SURGERY

## 2023-11-22 PROCEDURE — 80048 BASIC METABOLIC PNL TOTAL CA: CPT

## 2023-11-22 PROCEDURE — NC001 PR NO CHARGE: Performed by: FAMILY MEDICINE

## 2023-11-22 RX ORDER — QUETIAPINE FUMARATE 100 MG/1
100 TABLET, FILM COATED ORAL DAILY
Qty: 90 TABLET | Refills: 0 | Status: SHIPPED | OUTPATIENT
Start: 2023-11-22

## 2023-11-22 RX ORDER — LORAZEPAM 0.5 MG/1
0.5 TABLET ORAL DAILY PRN
Qty: 30 TABLET | Refills: 0 | Status: SHIPPED | OUTPATIENT
Start: 2023-11-22 | End: 2023-12-22

## 2023-11-22 RX ORDER — NYSTATIN 100000 [USP'U]/G
POWDER TOPICAL 2 TIMES DAILY
Qty: 15 G | Refills: 0 | Status: SHIPPED | OUTPATIENT
Start: 2023-11-22

## 2023-11-22 RX ORDER — SENNOSIDES 8.6 MG
1300 CAPSULE ORAL EVERY 8 HOURS PRN
Qty: 30 TABLET | Refills: 0 | Status: SHIPPED | OUTPATIENT
Start: 2023-11-22

## 2023-11-22 RX ORDER — CEFAZOLIN SODIUM 2 G/50ML
2000 SOLUTION INTRAVENOUS EVERY 8 HOURS
Status: DISCONTINUED | OUTPATIENT
Start: 2023-11-23 | End: 2023-11-22 | Stop reason: HOSPADM

## 2023-11-22 RX ORDER — CEPHALEXIN 500 MG/1
1000 CAPSULE ORAL
Qty: 60 CAPSULE | Refills: 0 | Status: SHIPPED | OUTPATIENT
Start: 2023-11-23 | End: 2023-12-03

## 2023-11-22 RX ORDER — LEVOTHYROXINE SODIUM 175 UG/1
175 TABLET ORAL DAILY
Qty: 90 TABLET | Refills: 0 | Status: SHIPPED | OUTPATIENT
Start: 2023-11-23 | End: 2024-02-21

## 2023-11-22 RX ORDER — ERGOCALCIFEROL 1.25 MG/1
50000 CAPSULE ORAL WEEKLY
Qty: 6 CAPSULE | Refills: 0 | Status: CANCELLED | OUTPATIENT
Start: 2023-11-22

## 2023-11-22 RX ORDER — LORAZEPAM 0.5 MG/1
0.5 TABLET ORAL DAILY PRN
Qty: 30 TABLET | Refills: 0 | Status: CANCELLED | OUTPATIENT
Start: 2023-11-22 | End: 2023-12-22

## 2023-11-22 RX ORDER — FLUOXETINE HYDROCHLORIDE 40 MG/1
40 CAPSULE ORAL 2 TIMES DAILY
Qty: 60 CAPSULE | Refills: 0 | Status: CANCELLED | OUTPATIENT
Start: 2023-11-22 | End: 2023-12-22

## 2023-11-22 RX ORDER — LEVOTHYROXINE SODIUM 175 UG/1
175 TABLET ORAL DAILY
Qty: 30 TABLET | Refills: 0 | Status: CANCELLED | OUTPATIENT
Start: 2023-11-22

## 2023-11-22 RX ORDER — QUETIAPINE FUMARATE 300 MG/1
600 TABLET, FILM COATED ORAL
Qty: 180 TABLET | Refills: 0 | Status: SHIPPED | OUTPATIENT
Start: 2023-11-22 | End: 2024-02-20

## 2023-11-22 RX ORDER — FLUOXETINE HYDROCHLORIDE 40 MG/1
40 CAPSULE ORAL 2 TIMES DAILY
Qty: 180 CAPSULE | Refills: 0 | Status: SHIPPED | OUTPATIENT
Start: 2023-11-22 | End: 2024-02-20

## 2023-11-22 RX ADMIN — OXYCODONE HYDROCHLORIDE 5 MG: 5 TABLET ORAL at 11:13

## 2023-11-22 RX ADMIN — OXYCODONE HYDROCHLORIDE 5 MG: 5 TABLET ORAL at 06:28

## 2023-11-22 RX ADMIN — FLUOXETINE 40 MG: 20 CAPSULE ORAL at 08:19

## 2023-11-22 RX ADMIN — OXYCODONE HYDROCHLORIDE 5 MG: 5 TABLET ORAL at 16:26

## 2023-11-22 RX ADMIN — LORAZEPAM 0.5 MG: 0.5 TABLET ORAL at 11:13

## 2023-11-22 RX ADMIN — LEVOTHYROXINE SODIUM 175 MCG: 125 TABLET ORAL at 08:19

## 2023-11-22 RX ADMIN — VANCOMYCIN HYDROCHLORIDE 1000 MG: 1 INJECTION, SOLUTION INTRAVENOUS at 05:20

## 2023-11-22 RX ADMIN — ENOXAPARIN SODIUM 40 MG: 40 INJECTION SUBCUTANEOUS at 08:19

## 2023-11-22 RX ADMIN — FLUOXETINE 40 MG: 20 CAPSULE ORAL at 17:20

## 2023-11-22 RX ADMIN — FERROUS SULFATE TAB 325 MG (65 MG ELEMENTAL FE) 325 MG: 325 (65 FE) TAB at 08:19

## 2023-11-22 RX ADMIN — QUETIAPINE FUMARATE 100 MG: 100 TABLET ORAL at 08:19

## 2023-11-22 NOTE — PROGRESS NOTES
Spiritual Care Progress Note    2023  Patient: Sadia Alaniz : 1965  Admission Date & Time: 2023 1150  MRN: [id number] CSN: [id number]      Fr Antonio Cruz met with the pt and provided prayers and blessings. The pt declined Fr's offers for anointing. No further needs were expressed at this time. Chaplains still remain available.        23 1000   Clinical Encounter Type   Visited With Patient   Episcopalian Encounters   Episcopalian Needs Prayer   Sacramental Encounters   Sacrament of Sick-Anointing Patient declined anointing

## 2023-11-22 NOTE — PROGRESS NOTES
Progress Note - Infectious Disease   Bethanne Clock Faix 63 y.o. female MRN: [id number]  Unit/Bed#: [facility] 612-01 Encounter: [id number]    1. Right index finger abscess with destruction of flexor sheath from rat bite on 23. S/p I&D on 23 by ortho. Deep infection warranting IV antibiotics. Less likely RBF with patient almost 7 days from exposure without systemic signs of infection. Patient without MRSA risk factors. S/p TDAP vaccine, does not need rabies treatment in the setting of a rat bite. Rec:       Start Cefazolin   Micro lab consulted to hold tissue cultures for 7 days to isolate streptobacillus   POD 3    Subjective:  Patient seen on AM rounds. Denies fevers, chills, or sweats. Denies nausea, vomiting, or diarrhea. Objective:  Vitals:  Temp:  [97.5 °F (36.4 °C)-98.3 °F (36.8 °C)] 97.5 °F (36.4 °C)  HR:  [72-81] 80  Resp:  [17-18] 17  BP: ()/(50-65) 91/56  SpO2:  [93 %-96 %] 93 %  Temp (24hrs), Av.9 °F (36.6 °C), Min:97.5 °F (36.4 °C), Max:98.3 °F (36.8 °C)  Current: Temperature: 97.5 °F (36.4 °C)    Physical Exam:   General:  No acute distress  Psychiatric:  Awake and alert  Pulmonary:  Normal respiratory excursion without accessory muscle use  Abdomen:  Soft, nontender  Extremities:  left hand in surgical gauze  Skin:  No rashes    Lab Results:  I have personally reviewed pertinent labs.   Results from last 7 days   Lab Units 23  0601 23  0508 23  0518 23  0022   POTASSIUM mmol/L 3.9 3.8 3.8 4.0   CHLORIDE mmol/L 107 110* 106 102   CO2 mmol/L 24 25 24 23   BUN mg/dL 7 8 10 19   CREATININE mg/dL 0.72 0.59* 0.58* 0.69   EGFR ml/min/1.73sq m 93 102 102 96   CALCIUM mg/dL 8.1* 7.8* 8.3* 9.1   AST U/L  --   --   --  17   ALT U/L  --   --   --  10   ALK PHOS U/L  --   --   --  76     Results from last 7 days   Lab Units 23  0601 23  0508 23  0518   WBC Thousand/uL 7.24 5.22 6.19   HEMOGLOBIN g/dL 10.0* 9.8* 10.1*   PLATELETS Thousands/uL 362 441 660 Results from last 7 days   Lab Units 11/19/23  1255   GRAM STAIN RESULT  Rare Polys  No bacteria seen       Imaging Studies:   I have personally reviewed pertinent imaging study reports and images in PACS. EKG, Pathology, and Other Studies:   I have personally reviewed pertinent reports.     Alyssa Wong, DO PGY-2

## 2023-11-22 NOTE — QUICK NOTE
Received [place] text from Ortho Layne Arteaga) stating that Per team, patient is cleared for discharge from Ortho standpoint with antibiotics per ID recommendations. Appreciate recommendations, will proceed.     George Martin, DO PGY-3  Family Medicine

## 2023-11-22 NOTE — DISCHARGE INSTR - AVS FIRST PAGE
Discharge Instructions - Orthopedics  Breezy Willoughby 63 y.o. female MRN: [id number]  Unit/Bed#: [facility] 612-01    Weight Bearing Status:                                           Nonweightbearing to right hand    Pain:  Continue analgesics as directed    Instructions:   Please soak your hand in warm water with antibacterial soap for 20 minutes, 3 times a day. After soaking, please wrap your hand with Xeroform, gauze, and Kerlix wrap in the same manner we have done and explained to you in the hospital.    Appt Instructions: If you do not have your appointment, please call the clinic at [phone]  Otherwise follow up as scheduled. Contact the office sooner if you experience any increased numbness/tingling in the extremities.

## 2023-11-22 NOTE — PROGRESS NOTES
Progress Notes - Family Medicine Residency, Maisha Capps 1965, 63 y.o. female. MRN: [id number]  Unit/Bed#: [facility] 616-81 Encounter: [id number]  Primary Care Provider: Mary Ann Romero PA-C      Admission Date: 11/19/2023 1150  Length of Stay: 3 days  Code Status:  Level 1 - Full Code  Disposition:   Consult:   IP CONSULT TO PSYCHIATRY  IP CONSULT TO ORTHOPEDIC SURGERY  IP CONSULT TO INFECTIOUS DISEASES  IP CONSULT TO PHARMACY  IP CONSULT TO PHARMACY         Assessment/Plan:      Plans discussed with [facility] team and finalization is pending attending physician attestation. Please, call  for any clarification. * Finger infection  Assessment & Plan  Patient had been bitten by her pet rat 4 days prior to presentation. Since then, she developed, swelling, pain and drainage from the Left 2nd finger. ED management:  - Vitals remain stable and CBC, CMP are WNL  - Patient was given the Tdap vaccine and started on Vancomycin and Ceftriaxone  - Discussed with Hand surgeon Dr. Irma Fonseca, recommend transfer for OR washout    Plan:  POD 2 from washout by ortho  Wound check today by ortho  Cleared to start wound soaks by Ortho  Wound cultures negative so far,   ID following, per ID wound cultures will be held for extended incubation  Continue Vancomycin and Ceftriaxone  Pain regimen      Suicidal ideation  Assessment & Plan  History of chronic bipolar disorder, depression on Prozac 40mg daily, Seroquel 300mg qhs, Ativan 0.5mg PRN  Unable to view PDMP  Per chart review, was following psychiatry and was prescribed medications through [facility], where she also received therapy. Ran out of her medications 4 days ago. Expressed active plan for SI while in the ED. Told ED provider "today I was feeling suicidal, if I had my Seroquel I would taken enough to end it all."   Could not be evaluated by crisis as she was not medically cleared.  After being evaluated following procedure, patient states she has just been feeling unlike herself since being off of her psych medications. She denies any current suicidal ideation. Plan:  Cleared by psych for return home on home psych medications with outpatient followup  Resumed regular diet  Resumed home psych meds of 600 mg quetiapine QHS, 100 mg quetiapine in AM and 40 mg fluoxetine BID and lorazapam 0.5 daily PRN  Prescription pended for 30-day supply of psychiatry medication, advised patient to [facility]  1:1 discontinued, cleared by psych    Depression  Assessment & Plan  See A/P under SI    Bipolar disorder ([address])  Assessment & Plan  See A/P under SI    Idiopathic hypotension  Assessment & Plan  Patient noted to be consistently hypotensive to 68R-91Y systolic. Complaining of occasional lightheadedness when getting out of bed and walking to the bathroom. Clinically suspect hypotension could be due to poorly controled hypothyroidism. Plan:  -Received bolus last night  -Encourage oral hydration  -Consider bolus if patient becomes symptomatic    Hypothyroidism  Assessment & Plan  Patient states she has thyroid medication at home but has not taken it in 1.5-2 weeks. Thyroid    Lab Results   Component Value Date    UAI3TGVPMDAY 24.269 (H) 11/20/2023    VLX3DRVAVTYV 0.661 10/26/2021    IDX9GFJEEYQT 134.000 (H) 08/02/2021    FREET4 0.65 (L) 08/02/2021        Plan:   - Elevated TSH likely result of medication non-adherence  - Continue levothyroxine 175mcg daily for now  - Recheck TSH in 6 weeks outpatient and titrate levothyroxine dose accordingly    Rash  Assessment & Plan  Patient has red itchy rash over lower abdomen. States this rash is not new. Reports it started after she developed a skin fold after losing weight following bariatric surgery.  Area appears red and irritated on exam. Patient has been struggling with frequent infections of this area for years, states often there is puss present and patient endorses she has required treatment for this in the past. Patient frustrated and requesting plastic surgery referral for evaluation after discharge. Plan:  -nystatin powder ordered, rash improving  -plastic surgery referral outpatient      History of Arnav-en-Y gastric bypass  Assessment & Plan  Performed in [facility] at [facility] with Dr. Alireza Castaneda   Avoid NSAIDs  B-vitamin levels last checked in 2021  Vitamin D level low at 13    Plan:  Recheck B vitamin levels  We will consider resuming home supplements if levels low  Vitamin D repletion          Diet: Diet Regular; Regular House    VTE Pharm PPX: Enoxaparin (Lovenox)  [facility] sequential compression device      Subjective:   63 y.o. female admitted 11/19/2023 for finger infection    Today 11/22/23, HD# 3    Patient seen and examined at bedside this AM.  Patient expresses concern that vision is blurry, patient noted to not be wearing glasses at this time. Reassurance provided. Patient states her fingers are still mildly painful but denies any fevers, chills, chest pain, shortness of breath, nausea vomiting. Patient blood pressure has notably been low in the 74G to 96A systolic during this hospital stay. Patient reports that she occasionally has some mild light headedness when she gets up to walk to the bathroom. Otherwise patient has no complaints.       Objective:     Vitals:    11/21/23 1959 11/21/23 2231 11/22/23 0527 11/22/23 0808   BP: 102/65 106/59 91/53 91/56   Pulse: 79 81 72 80   Resp: 18  18 17   Temp: 97.8 °F (36.6 °C) 98 °F (36.7 °C) 97.9 °F (36.6 °C) 97.5 °F (36.4 °C)   TempSrc:       SpO2: 95% 93% 95% 93%     Temp:  [97.5 °F (36.4 °C)-98.3 °F (36.8 °C)] 97.5 °F (36.4 °C)  HR:  [72-81] 80  Resp:  [17-18] 17  BP: ()/(50-65) 91/56  Weight (last 2 days)       None            Intake/Output Summary (Last 24 hours) at 11/22/2023 1012  Last data filed at 11/22/2023 0901  Gross per 24 hour   Intake 850 ml   Output --   Net 850 ml     Invasive Devices       Peripheral Intravenous Line  Duration             Peripheral IV 11/21/23 Right;Ventral (anterior) Forearm <1 day              Drain  Duration             Open Drain Anterior;Right Hand 2 days                      Physical Exam:     Physical Exam  Constitutional:       Appearance: Normal appearance. Cardiovascular:      Rate and Rhythm: Normal rate and regular rhythm. Pulses: Normal pulses. Heart sounds: Normal heart sounds. No murmur heard. No friction rub. No gallop. Pulmonary:      Effort: Pulmonary effort is normal.      Breath sounds: Normal breath sounds. No wheezing, rhonchi or rales. Musculoskeletal:      Right lower leg: No edema. Left lower leg: No edema. Comments: Finger bandage clean dry and intact   Skin:     General: Skin is warm and dry. Neurological:      Mental Status: She is alert. Psychiatric:         Mood and Affect: Mood is anxious.            Labs:     CBC:  Results from last 7 days   Lab Units 11/22/23  0601 11/21/23  0508 11/20/23 0518 11/19/23  0022   WBC Thousand/uL 7.24 5.22 6.19 8.24   HEMOGLOBIN g/dL 10.0* 9.8* 10.1* 12.5   HEMATOCRIT % 31.3* 29.8* 31.0* 40.2   PLATELETS Thousands/uL 362 317 279 218   NEUTROS ABS Thousands/µL  --   --   --  5.54       CMP:  Results from last 7 days   Lab Units 11/22/23  0601 11/21/23  0508 11/20/23 0518 11/19/23  0022   POTASSIUM mmol/L 3.9 3.8 3.8 4.0   CHLORIDE mmol/L 107 110* 106 102   CO2 mmol/L 24 25 24 23   BUN mg/dL 7 8 10 19   CREATININE mg/dL 0.72 0.59* 0.58* 0.69   CALCIUM mg/dL 8.1* 7.8* 8.3* 9.1   AST U/L  --   --   --  17   ALT U/L  --   --   --  10   ALK PHOS U/L  --   --   --  76   EGFR ml/min/1.73sq m 93 102 102 96       Sepsis:  Results from last 7 days   Lab Units 11/22/23  0601 11/20/23 0518   CRP mg/L 23.6* 68.1*       Micro:  Lab Results   Component Value Date/Time    Gram Stain Result Rare Polys 11/19/2023 12:55 PM    Gram Stain Result No bacteria seen 11/19/2023 12:55 PM         Imaging:     XR hand 3+ vw right    Result Date: 11/20/2023  Impression: No acute osseous abnormality.  Workstation performed: [id number]         Medications:     Current Facility-Administered Medications   Medication Dose Route Frequency    acetaminophen (TYLENOL) tablet 650 mg  650 mg Oral Q4H PRN    [START ON 11/23/2023] ceFAZolin (ANCEF) IVPB (premix in dextrose) 2,000 mg 50 mL  2,000 mg Intravenous Q8H    enoxaparin (LOVENOX) subcutaneous injection 40 mg  40 mg Subcutaneous Daily    ergocalciferol (VITAMIN D2) capsule 50,000 Units  50,000 Units Oral Weekly    ferrous sulfate tablet 325 mg  325 mg Oral Daily With Breakfast    FLUoxetine (PROzac) capsule 40 mg  40 mg Oral BID    HYDROmorphone (DILAUDID) injection 0.2 mg  0.2 mg Intravenous Q2H PRN    levothyroxine tablet 175 mcg  175 mcg Oral Daily    LORazepam (ATIVAN) tablet 0.5 mg  0.5 mg Oral Daily PRN    nystatin (MYCOSTATIN) powder   Topical BID    oxyCODONE (ROXICODONE) split tablet 2.5 mg  2.5 mg Oral Q4H PRN    Or    oxyCODONE (ROXICODONE) IR tablet 5 mg  5 mg Oral Q4H PRN    polyethylene glycol (MIRALAX) packet 17 g  17 g Oral Daily PRN    QUEtiapine (SEROquel) tablet 100 mg  100 mg Oral Daily    QUEtiapine (SEROquel) tablet 600 mg  600 mg Oral HS                   Theta MD Tanya  Family Medicine, PGY-1  10:12 AM 11/22/2023

## 2023-11-22 NOTE — PLAN OF CARE
Problem: PAIN - ADULT  Goal: Verbalizes/displays adequate comfort level or baseline comfort level  Description: Interventions:  - Encourage patient to monitor pain and request assistance  - Assess pain using appropriate pain scale  - Administer analgesics based on type and severity of pain and evaluate response  - Implement non-pharmacological measures as appropriate and evaluate response  - Consider cultural and social influences on pain and pain management  - Notify physician/advanced practitioner if interventions unsuccessful or patient reports new pain  Outcome: Progressing     Problem: INFECTION - ADULT  Goal: Absence or prevention of progression during hospitalization  Description: INTERVENTIONS:  - Assess and monitor for signs and symptoms of infection  - Monitor lab/diagnostic results  - Monitor all insertion sites, i.e. indwelling lines, tubes, and drains  - Monitor endotracheal if appropriate and nasal secretions for changes in amount and color  - [place] appropriate cooling/warming therapies per order  - Administer medications as ordered  - Instruct and encourage patient and family to use good hand hygiene technique  - Identify and instruct in appropriate isolation precautions for identified infection/condition  Outcome: Progressing  Goal: Absence of fever/infection during neutropenic period  Description: INTERVENTIONS:  - Monitor WBC    Outcome: Progressing     Problem: SAFETY ADULT  Goal: Patient will remain free of falls  Description: INTERVENTIONS:  - Educate patient/family on patient safety including physical limitations  - Instruct patient to call for assistance with activity   - Consult OT/PT to assist with strengthening/mobility   - Keep Call bell within reach  - Keep bed low and locked with side rails adjusted as appropriate  - Keep care items and personal belongings within reach  - Initiate and maintain comfort rounds  - Make Fall Risk Sign visible to staff  - Offer Toileting every 2 Hours, in advance of need  - Initiate/Maintain alarm  - Obtain necessary fall risk management equipment:   - Apply yellow socks and bracelet for high fall risk patients  - Consider moving patient to room near nurses station  Outcome: Progressing  Goal: Maintain or return to baseline ADL function  Description: INTERVENTIONS:  -  Assess patient's ability to carry out ADLs; assess patient's baseline for ADL function and identify physical deficits which impact ability to perform ADLs (bathing, care of mouth/teeth, toileting, grooming, dressing, etc.)  - Assess/evaluate cause of self-care deficits   - Assess range of motion  - Assess patient's mobility; develop plan if impaired  - Assess patient's need for assistive devices and provide as appropriate  - Encourage maximum independence but intervene and supervise when necessary  - Involve family in performance of ADLs  - Assess for home care needs following discharge   - Consider OT consult to assist with ADL evaluation and planning for discharge  - Provide patient education as appropriate  Outcome: Progressing  Goal: Maintains/Returns to pre admission functional level  Description: INTERVENTIONS:  - Perform AM-PAC 6 Click Basic Mobility/ Daily Activity assessment daily.  - Set and communicate daily mobility goal to care team and patient/family/caregiver. - Collaborate with rehabilitation services on mobility goals if consulted  - Perform Range of Motion 3 times a day. - Reposition patient every 2 hours.   - Dangle patient 3 times a day  - Stand patient 3 times a day  - Ambulate patient 3 times a day  - Out of bed to chair 3 times a day   - Out of bed for meals 3 times a day  - Out of bed for toileting  - Record patient progress and toleration of activity level   Outcome: Progressing     Problem: DISCHARGE PLANNING  Goal: Discharge to home or other facility with appropriate resources  Description: INTERVENTIONS:  - Identify barriers to discharge w/patient and caregiver  - Arrange for needed discharge resources and transportation as appropriate  - Identify discharge learning needs (meds, wound care, etc.)  - Arrange for interpretive services to assist at discharge as needed  - Refer to Case Management Department for coordinating discharge planning if the patient needs post-hospital services based on physician/advanced practitioner order or complex needs related to functional status, cognitive ability, or social support system  Outcome: Progressing     Problem: Knowledge Deficit  Goal: Patient/family/caregiver demonstrates understanding of disease process, treatment plan, medications, and discharge instructions  Description: Complete learning assessment and assess knowledge base.   Interventions:  - Provide teaching at level of understanding  - Provide teaching via preferred learning methods  Outcome: Progressing

## 2023-11-22 NOTE — DISCHARGE SUMMARY
Discharge Summary - Johanna Willoughby 63 y.o. female MRN: [id number]    Unit/Bed#: [address] [address] Encounter: [id number]    Admission Date: 11/19/2023   Discharge Date: 11/22/2023    Diagnosis: Finger infection [L08.9]    Important Physician Related Follow Up:   PCP within 2 weeks     Procedures Performed: No orders of the defined types were placed in this encounter. Significant Findings/Abnormal Results with this admission:  Finger infection    Hospital Course:    Per admitting physician, "Patient transferred from [facility] ED for hand surgery evaluation, also requiring psychiatry evaluation for active SI. PMH significant for bipolar disorder, depression, hypothyroidism, hx of gastric bypass surgery. Four days prior to presenting in the ED, the patient got bit by her pet rat on her L-2nd finger. There were lacerations/bleeding which overall resolved, but she developed edema, pain, and drainage from the finger. Patient was given the Tdap booster and started on antibiotics. Plan was discussed with Dr. Lia Morrison who recommended transfer for OR washout. Pt also admitted to having active SI in the ED. Pt follows with psychiatry, [facility] where she is prescribed medications and get therapy. She ran out of her medications including Seroquel and Prozac 4 days ago and since then, she stated "today I was feeling suicidal, if I had my Seroquel I would taken enough to end it all." As patient was not medically cleared, she was not evaluated by the crisis team. Patient seen at bedside following debridement of finger. She endorses she has been in a bad mental state since running out of her medications and last saw her psychiatrist 5 months ago. She denies any current suicidal thoughts."    Patient was admitted to medical floor. Patient underwent incision and drainage with Ortho. Patient was managed with IV antibiotics, a one-to-one supervision protocol.   Patient was evaluated by psychiatry, cleared to proceed home with home medications. One-to-one supervision discontinued. Patient was seen by infectious disease, who helped tailor medical management. Patient was cleared by infectious disease and orthopedics for discharge. Psychiatry tree medications and antibiotics were sent to pharmacy. On day of discharge, pt was clinically improved. Plan was discussed with pt, and pt was agreeable. Pt will be discharged to home    Complications: None    Exam on Day of Discharge:   Please see progress note from date of discharge    Condition at Discharge: good     Discharge instructions/Information to patient and family:   See after visit summary for information provided to patient and family. Provisions for Follow-Up Care:  See after visit summary for information related to follow-up care and any pertinent home health orders. Disposition: Home    Planned Readmission: No    Discharge Statement   I spent 30 minutes discharging the patient. This time was spent on the day of discharge. I had direct contact with the patient on the day of discharge. Additional documentation is required if more than 30 minutes were spent on discharge. Discharge Medications:  See after visit summary for reconciled discharge medications provided to patient and family.   Medication changes made with this admission:  Please see AVS summary    Tae Mchugh [facility] Family Medicine PGY 3  11/22/2023  1:38 PM

## 2023-11-24 ENCOUNTER — TRANSITIONAL CARE MANAGEMENT (OUTPATIENT)
Dept: FAMILY MEDICINE CLINIC | Facility: CLINIC | Age: 58
End: 2023-11-24

## 2023-11-24 ENCOUNTER — TELEPHONE (OUTPATIENT)
Dept: FAMILY MEDICINE CLINIC | Facility: CLINIC | Age: 58
End: 2023-11-24

## 2023-11-24 NOTE — TELEPHONE ENCOUNTER
Hosp: [facility]  Admitted: 11/19/23  D/C'd: 11/22/23 to: HOME  Verify how patient is feeling and if they are in need of anything before their visit. Please send appointment date and patient questions/concerns to MADIHA to complete TCM.

## 2023-11-24 NOTE — UTILIZATION REVIEW
NOTIFICATION OF ADMISSION DISCHARGE   This is a Notification of Discharge from Marcella Gant. Please be advised that this patient has been discharge from our facility. Below you will find the admission and discharge date and time including the patient’s disposition. UTILIZATION REVIEW CONTACT:  Fatuma Diaz  Utilization   Network Utilization Review Department  Phone: [phone] x carefully listen to the prompts. All voicemails are confidential.  Email: [email or web address]     ADMISSION INFORMATION  PRESENTATION DATE: 11/19/2023 11:50 AM  OBERVATION ADMISSION DATE:   INPATIENT ADMISSION DATE: 11/19/23 11:50 AM   DISCHARGE DATE: 11/22/2023  7:12 PM   DISPOSITION:Home/Self Care    Network Utilization Review Department  ATTENTION: Please call with any questions or concerns to [phone] and carefully listen to the prompts so that you are directed to the right person. All voicemails are confidential.   For Discharge needs, contact Care Management DC Support Team at [phone] opt. 2  Send all requests for admission clinical reviews, approved or denied determinations and any other requests to dedicated fax number below belonging to the campus where the patient is receiving treatment.  List of dedicated fax numbers for the Facilities:  [place] DENIALS (Administrative/Medical Necessity) [phone]   DISCHARGE SUPPORT TEAM (Network) [phone] [address] [facility] (Maternity/NICU/Pediatrics) [phone]   [address] [facility] [address] [place] [address] [phone]   [facility] [address] [facility] [phone]   [address] [place] [address]  [address]  [phone]

## 2023-11-26 LAB
BACTERIA SPEC ANAEROBE CULT: NORMAL
BACTERIA TISS AEROBE CULT: ABNORMAL
BACTERIA TISS AEROBE CULT: NO GROWTH
GRAM STN SPEC: ABNORMAL
GRAM STN SPEC: ABNORMAL

## 2023-11-27 LAB
BACTERIA SPEC ANAEROBE CULT: NORMAL
BACTERIA TISS AEROBE CULT: ABNORMAL
BACTERIA TISS AEROBE CULT: ABNORMAL
GRAM STN SPEC: ABNORMAL
GRAM STN SPEC: ABNORMAL

## 2023-12-08 ENCOUNTER — OFFICE VISIT (OUTPATIENT)
Dept: OBGYN CLINIC | Facility: CLINIC | Age: 58
End: 2023-12-08

## 2023-12-08 VITALS
WEIGHT: 139 LBS | HEIGHT: 61 IN | SYSTOLIC BLOOD PRESSURE: 105 MMHG | DIASTOLIC BLOOD PRESSURE: 68 MMHG | BODY MASS INDEX: 26.24 KG/M2

## 2023-12-08 DIAGNOSIS — L08.9 FINGER INFECTION: Primary | ICD-10-CM

## 2023-12-08 DIAGNOSIS — M24.541 CONTRACTURE OF JOINT OF FINGER OF RIGHT HAND: ICD-10-CM

## 2023-12-08 PROCEDURE — 99024 POSTOP FOLLOW-UP VISIT: CPT | Performed by: ORTHOPAEDIC SURGERY

## 2023-12-08 NOTE — PROGRESS NOTES
HAND & UPPER EXTREMITY OFFICE VISIT   Referred By:  No referring provider defined for this encounter. Chief Complaint:     Right index finger flexor tenosynovitis    Surgery:  Surgery Date: 11/19/23 - right index finger abscess and flexor sheath irrigation and debridement - Right     History of Present Illness:   Patient presents now 19 days status post the above surgery. she reports pain, redness, swelling, and stiffness of the right index finger which is unchanged from prior to surgery. She also notes numbness of the volar index finger and feeling more tired since the surgery. She denies fevers/chills or drainage at the incision site.      Past Medical History:  Past Medical History:   Diagnosis Date    Ankle fracture     Attention deficit disorder 11/1/2011    Bipolar 1 disorder ([address])     Depression     Depression 7/19/2021    Disease of thyroid gland     Foot fracture, left     Pneumonia 12/2008    Psychiatric disorder     Renal disorder     Tachycardia 12/2008     Past Surgical History:   Procedure Laterality Date    GASTRIC BYPASS  01/25/2016    HAND DEBRIDEMENT Right 11/19/2023    Procedure: right index finger abscess and flexor sheath irrigation and debridement;  Surgeon: Matilde Martinez MD;  Location: [facility];  Service: Orthopedics    ALIS-EN-Y PROCEDURE       Family History   Adopted: Yes     Social History     Socioeconomic History    Marital status: Single     Spouse name: Not on file    Number of children: Not on file    Years of education: Not on file    Highest education level: Not on file   Occupational History    Not on file   Tobacco Use    Smoking status: Never    Smokeless tobacco: Never   Substance and Sexual Activity    Alcohol use: Never    Drug use: Never    Sexual activity: Not on file   Other Topics Concern    Not on file   Social History Narrative    ** Merged History Encounter **          Social Determinants of Health     Financial Resource Strain: High Risk (11/8/2021) Overall Financial Resource Strain (CARDIA)     Difficulty of Paying Living Expenses: Hard   Food Insecurity: No Food Insecurity (11/8/2021)    Hunger Vital Sign     Worried About Running Out of Food in the Last Year: Never true     Ran Out of Food in the Last Year: Never true   Transportation Needs: No Transportation Needs (11/8/2021)    PRAPARE - Transportation     Lack of Transportation (Medical): No     Lack of Transportation (Non-Medical): No   Physical Activity: Not on file   Stress: Not on file   Social Connections: Not on file   Intimate Partner Violence: Not on file   Housing Stability: Not on file     Scheduled Meds:  Continuous Infusions:No current facility-administered medications for this visit. PRN Meds:. No Known Allergies    Physical Examination:    /68   Ht 5' 1" (1.549 m)   Wt 63 kg (139 lb)   BMI 26.26 kg/m²     Gen: A&Ox3, NAD    Right Upper Extremity:  Dressing clean and dry, removed  Sutures intact, removed  Underlying incision healing well without signs of infection   Mild swelling and erythema of the new skin on the index finger and palm  She has thickened scar tissue particularly over the palm in line with her incision  Diminished sensation of the volar index finger. Limited flexion of the index finger with a 35 to 40 degree PIP joint contracture  No visible bowstringing but she has minimal range of motion right now and thickened scar tissue in the palm. 2+RP      Studies:  No new imaging to review today. Assessment and Plan:  1. Finger infection            63 y.o. female presents 19 days status post right index finger abscess and flexor sheath irrigation and debridement - Right. Sutures removed in the office today. Is no further signs of infection and her wound is healed up really well but she does have a lot of scar tissue and finger stiffness. I think now the primary goal of the therapy and finger range of motion.   Does not seem like she is doing much motion prior to today's visit due to fear of injuring the incision or entering the finger. She may require static progressive splinting to correct her PIP joint contracture at this point. She was also instructed in scar massage today to decrease some of her sensitivity. And she was given a compression glove for some of her swelling in the hand. Therapy order was written today for aggressive finger range of motion and with or without static progressive splinting. It is recommended she return to the office in 4 weeks for repeat evaluation and range of motion check. she expressed understanding of the plan and agreed. We encouraged them to contact our office with any questions or concerns. Chantel Acosta MD  Hand and Upper Extremity Surgery          *This note was dictated using Dragon voice recognition software. Please excuse any word substitutions or errors. *

## 2023-12-18 ENCOUNTER — APPOINTMENT (EMERGENCY)
Dept: CT IMAGING | Facility: HOSPITAL | Age: 58
End: 2023-12-18
Payer: COMMERCIAL

## 2023-12-18 ENCOUNTER — HOSPITAL ENCOUNTER (OUTPATIENT)
Facility: HOSPITAL | Age: 58
Setting detail: OBSERVATION
Discharge: HOME | End: 2023-12-19
Attending: EMERGENCY MEDICINE | Admitting: STUDENT IN AN ORGANIZED HEALTH CARE EDUCATION/TRAINING PROGRAM
Payer: COMMERCIAL

## 2023-12-18 DIAGNOSIS — F10.939 ALCOHOL WITHDRAWAL (HCC): Primary | ICD-10-CM

## 2023-12-18 DIAGNOSIS — N17.9 AKI (ACUTE KIDNEY INJURY) (HCC): ICD-10-CM

## 2023-12-18 DIAGNOSIS — F10.930 ALCOHOL WITHDRAWAL SYNDROME WITHOUT COMPLICATION (HCC): ICD-10-CM

## 2023-12-18 DIAGNOSIS — S09.90XA INJURY OF HEAD, INITIAL ENCOUNTER: ICD-10-CM

## 2023-12-18 DIAGNOSIS — F10.90 ALCOHOL USE DISORDER: ICD-10-CM

## 2023-12-18 DIAGNOSIS — R55 SYNCOPE: ICD-10-CM

## 2023-12-18 LAB
ALBUMIN SERPL BCP-MCNC: 3.5 G/DL (ref 3.5–5)
ALP SERPL-CCNC: 94 U/L (ref 34–104)
ALT SERPL W P-5'-P-CCNC: 16 U/L (ref 7–52)
AMPHETAMINES SERPL QL SCN: NEGATIVE
ANION GAP SERPL CALCULATED.3IONS-SCNC: 8 MMOL/L
AST SERPL W P-5'-P-CCNC: 26 U/L (ref 13–39)
BARBITURATES UR QL: NEGATIVE
BASOPHILS # BLD AUTO: 0.04 THOUSANDS/ÂΜL (ref 0–0.1)
BASOPHILS NFR BLD AUTO: 1 % (ref 0–1)
BENZODIAZ UR QL: NEGATIVE
BILIRUB SERPL-MCNC: 0.34 MG/DL (ref 0.2–1)
BUN SERPL-MCNC: 29 MG/DL (ref 5–25)
CALCIUM SERPL-MCNC: 8.3 MG/DL (ref 8.4–10.2)
CARDIAC TROPONIN I PNL SERPL HS: <2 NG/L
CHLORIDE SERPL-SCNC: 102 MMOL/L (ref 96–108)
CO2 SERPL-SCNC: 28 MMOL/L (ref 21–32)
COCAINE UR QL: NEGATIVE
CREAT SERPL-MCNC: 1.38 MG/DL (ref 0.6–1.3)
EOSINOPHIL # BLD AUTO: 0.11 THOUSAND/ÂΜL (ref 0–0.61)
EOSINOPHIL NFR BLD AUTO: 2 % (ref 0–6)
ERYTHROCYTE [DISTWIDTH] IN BLOOD BY AUTOMATED COUNT: 13.1 % (ref 11.6–15.1)
ETHANOL SERPL-MCNC: <10 MG/DL
GFR SERPL CREATININE-BSD FRML MDRD: 42 ML/MIN/1.73SQ M
GLUCOSE SERPL-MCNC: 110 MG/DL (ref 65–140)
HCT VFR BLD AUTO: 35.3 % (ref 34.8–46.1)
HGB BLD-MCNC: 11.7 G/DL (ref 11.5–15.4)
IMM GRANULOCYTES # BLD AUTO: 0.08 THOUSAND/UL (ref 0–0.2)
IMM GRANULOCYTES NFR BLD AUTO: 2 % (ref 0–2)
LYMPHOCYTES # BLD AUTO: 1.04 THOUSANDS/ÂΜL (ref 0.6–4.47)
LYMPHOCYTES NFR BLD AUTO: 22 % (ref 14–44)
MAGNESIUM SERPL-MCNC: 1.4 MG/DL (ref 1.9–2.7)
MCH RBC QN AUTO: 31 PG (ref 26.8–34.3)
MCHC RBC AUTO-ENTMCNC: 33.1 G/DL (ref 31.4–37.4)
MCV RBC AUTO: 93 FL (ref 82–98)
MONOCYTES # BLD AUTO: 0.36 THOUSAND/ÂΜL (ref 0.17–1.22)
MONOCYTES NFR BLD AUTO: 8 % (ref 4–12)
NEUTROPHILS # BLD AUTO: 3.18 THOUSANDS/ÂΜL (ref 1.85–7.62)
NEUTS SEG NFR BLD AUTO: 65 % (ref 43–75)
NRBC BLD AUTO-RTO: 0 /100 WBCS
OPIATES UR QL SCN: NEGATIVE
OXYCODONE+OXYMORPHONE UR QL SCN: NEGATIVE
PCP UR QL: NEGATIVE
PLATELET # BLD AUTO: 166 THOUSANDS/UL (ref 149–390)
PMV BLD AUTO: 10 FL (ref 8.9–12.7)
POTASSIUM SERPL-SCNC: 3.8 MMOL/L (ref 3.5–5.3)
PROT SERPL-MCNC: 6 G/DL (ref 6.4–8.4)
RBC # BLD AUTO: 3.78 MILLION/UL (ref 3.81–5.12)
SODIUM SERPL-SCNC: 138 MMOL/L (ref 135–147)
THC UR QL: POSITIVE
WBC # BLD AUTO: 4.81 THOUSAND/UL (ref 4.31–10.16)

## 2023-12-18 PROCEDURE — 36415 COLL VENOUS BLD VENIPUNCTURE: CPT | Performed by: PHYSICIAN ASSISTANT

## 2023-12-18 PROCEDURE — 83735 ASSAY OF MAGNESIUM: CPT | Performed by: STUDENT IN AN ORGANIZED HEALTH CARE EDUCATION/TRAINING PROGRAM

## 2023-12-18 PROCEDURE — 90686 IIV4 VACC NO PRSV 0.5 ML IM: CPT | Performed by: STUDENT IN AN ORGANIZED HEALTH CARE EDUCATION/TRAINING PROGRAM

## 2023-12-18 PROCEDURE — G1004 CDSM NDSC: HCPCS

## 2023-12-18 PROCEDURE — 96361 HYDRATE IV INFUSION ADD-ON: CPT

## 2023-12-18 PROCEDURE — 99285 EMERGENCY DEPT VISIT HI MDM: CPT | Performed by: PHYSICIAN ASSISTANT

## 2023-12-18 PROCEDURE — 85025 COMPLETE CBC W/AUTO DIFF WBC: CPT | Performed by: PHYSICIAN ASSISTANT

## 2023-12-18 PROCEDURE — 96374 THER/PROPH/DIAG INJ IV PUSH: CPT

## 2023-12-18 PROCEDURE — 82077 ASSAY SPEC XCP UR&BREATH IA: CPT | Performed by: PHYSICIAN ASSISTANT

## 2023-12-18 PROCEDURE — 84484 ASSAY OF TROPONIN QUANT: CPT | Performed by: PHYSICIAN ASSISTANT

## 2023-12-18 PROCEDURE — 70450 CT HEAD/BRAIN W/O DYE: CPT

## 2023-12-18 PROCEDURE — 99223 1ST HOSP IP/OBS HIGH 75: CPT | Performed by: STUDENT IN AN ORGANIZED HEALTH CARE EDUCATION/TRAINING PROGRAM

## 2023-12-18 PROCEDURE — 72125 CT NECK SPINE W/O DYE: CPT

## 2023-12-18 PROCEDURE — 99285 EMERGENCY DEPT VISIT HI MDM: CPT

## 2023-12-18 PROCEDURE — G0008 ADMIN INFLUENZA VIRUS VAC: HCPCS | Performed by: STUDENT IN AN ORGANIZED HEALTH CARE EDUCATION/TRAINING PROGRAM

## 2023-12-18 PROCEDURE — 80307 DRUG TEST PRSMV CHEM ANLYZR: CPT | Performed by: PHYSICIAN ASSISTANT

## 2023-12-18 PROCEDURE — 80053 COMPREHEN METABOLIC PANEL: CPT | Performed by: PHYSICIAN ASSISTANT

## 2023-12-18 RX ORDER — LANOLIN ALCOHOL/MO/W.PET/CERES
100 CREAM (GRAM) TOPICAL DAILY
Status: DISCONTINUED | OUTPATIENT
Start: 2023-12-19 | End: 2023-12-19 | Stop reason: HOSPADM

## 2023-12-18 RX ORDER — SENNOSIDES 8.6 MG
1 TABLET ORAL DAILY
Status: DISCONTINUED | OUTPATIENT
Start: 2023-12-19 | End: 2023-12-19 | Stop reason: HOSPADM

## 2023-12-18 RX ORDER — ENOXAPARIN SODIUM 100 MG/ML
40 INJECTION SUBCUTANEOUS DAILY
Status: DISCONTINUED | OUTPATIENT
Start: 2023-12-19 | End: 2023-12-19 | Stop reason: HOSPADM

## 2023-12-18 RX ORDER — FOLIC ACID 1 MG/1
1 TABLET ORAL DAILY
Status: DISCONTINUED | OUTPATIENT
Start: 2023-12-19 | End: 2023-12-19 | Stop reason: HOSPADM

## 2023-12-18 RX ORDER — FERROUS SULFATE 325(65) MG
325 TABLET ORAL
Status: DISCONTINUED | OUTPATIENT
Start: 2023-12-18 | End: 2023-12-18

## 2023-12-18 RX ORDER — DOCUSATE SODIUM 100 MG/1
100 CAPSULE, LIQUID FILLED ORAL 2 TIMES DAILY
Status: DISCONTINUED | OUTPATIENT
Start: 2023-12-18 | End: 2023-12-19 | Stop reason: HOSPADM

## 2023-12-18 RX ORDER — ONDANSETRON 2 MG/ML
4 INJECTION INTRAMUSCULAR; INTRAVENOUS EVERY 6 HOURS PRN
Status: DISCONTINUED | OUTPATIENT
Start: 2023-12-18 | End: 2023-12-19 | Stop reason: HOSPADM

## 2023-12-18 RX ORDER — QUETIAPINE FUMARATE 300 MG/1
600 TABLET, FILM COATED ORAL
Status: DISCONTINUED | OUTPATIENT
Start: 2023-12-18 | End: 2023-12-19 | Stop reason: HOSPADM

## 2023-12-18 RX ORDER — FERROUS SULFATE 325(65) MG
325 TABLET ORAL
Status: DISCONTINUED | OUTPATIENT
Start: 2023-12-19 | End: 2023-12-19 | Stop reason: HOSPADM

## 2023-12-18 RX ORDER — SODIUM CHLORIDE 9 MG/ML
150 INJECTION, SOLUTION INTRAVENOUS CONTINUOUS
Status: DISCONTINUED | OUTPATIENT
Start: 2023-12-18 | End: 2023-12-19 | Stop reason: HOSPADM

## 2023-12-18 RX ORDER — ACETAMINOPHEN 325 MG/1
650 TABLET ORAL EVERY 6 HOURS PRN
Status: DISCONTINUED | OUTPATIENT
Start: 2023-12-18 | End: 2023-12-19 | Stop reason: HOSPADM

## 2023-12-18 RX ORDER — QUETIAPINE FUMARATE 100 MG/1
100 TABLET, FILM COATED ORAL DAILY
Status: DISCONTINUED | OUTPATIENT
Start: 2023-12-19 | End: 2023-12-19 | Stop reason: HOSPADM

## 2023-12-18 RX ORDER — LORAZEPAM 1 MG/1
0.5 TABLET ORAL DAILY PRN
Status: DISCONTINUED | OUTPATIENT
Start: 2023-12-18 | End: 2023-12-19 | Stop reason: HOSPADM

## 2023-12-18 RX ORDER — SODIUM CHLORIDE, SODIUM GLUCONATE, SODIUM ACETATE, POTASSIUM CHLORIDE, MAGNESIUM CHLORIDE, SODIUM PHOSPHATE, DIBASIC, AND POTASSIUM PHOSPHATE .53; .5; .37; .037; .03; .012; .00082 G/100ML; G/100ML; G/100ML; G/100ML; G/100ML; G/100ML; G/100ML
125 INJECTION, SOLUTION INTRAVENOUS CONTINUOUS
Status: DISCONTINUED | OUTPATIENT
Start: 2023-12-18 | End: 2023-12-19 | Stop reason: HOSPADM

## 2023-12-18 RX ORDER — DIAZEPAM 5 MG/ML
2.5 INJECTION, SOLUTION INTRAMUSCULAR; INTRAVENOUS ONCE
Status: COMPLETED | OUTPATIENT
Start: 2023-12-18 | End: 2023-12-18

## 2023-12-18 RX ORDER — FLUOXETINE HYDROCHLORIDE 20 MG/1
40 CAPSULE ORAL 2 TIMES DAILY
Status: DISCONTINUED | OUTPATIENT
Start: 2023-12-18 | End: 2023-12-19 | Stop reason: HOSPADM

## 2023-12-18 RX ADMIN — SODIUM CHLORIDE, SODIUM GLUCONATE, SODIUM ACETATE, POTASSIUM CHLORIDE, MAGNESIUM CHLORIDE, SODIUM PHOSPHATE, DIBASIC, AND POTASSIUM PHOSPHATE 125 ML/HR: .53; .5; .37; .037; .03; .012; .00082 INJECTION, SOLUTION INTRAVENOUS at 18:41

## 2023-12-18 RX ADMIN — SODIUM CHLORIDE 150 ML/HR: 0.9 INJECTION, SOLUTION INTRAVENOUS at 16:54

## 2023-12-18 RX ADMIN — SODIUM CHLORIDE 1000 ML: 0.9 INJECTION, SOLUTION INTRAVENOUS at 16:04

## 2023-12-18 RX ADMIN — LORAZEPAM 0.5 MG: 1 TABLET ORAL at 22:37

## 2023-12-18 RX ADMIN — FERROUS SULFATE TAB 325 MG (65 MG ELEMENTAL FE) 325 MG: 325 (65 FE) TAB at 18:46

## 2023-12-18 RX ADMIN — FLUOXETINE 40 MG: 20 CAPSULE ORAL at 18:46

## 2023-12-18 RX ADMIN — DOCUSATE SODIUM 100 MG: 100 CAPSULE, LIQUID FILLED ORAL at 18:46

## 2023-12-18 RX ADMIN — QUETIAPINE FUMARATE 600 MG: 300 TABLET ORAL at 22:30

## 2023-12-18 RX ADMIN — INFLUENZA VIRUS VACCINE 0.5 ML: 15; 15; 15; 15 SUSPENSION INTRAMUSCULAR at 18:46

## 2023-12-18 RX ADMIN — DIAZEPAM 2.5 MG: 10 INJECTION, SOLUTION INTRAMUSCULAR; INTRAVENOUS at 15:38

## 2023-12-18 NOTE — ASSESSMENT & PLAN NOTE
Patient presents with creatinine of 1.38 from baseline of 0.5-0.7 in setting of decreased p.o. intake  Likely prerenal in etiology  Start patient on IV fluid hydration and repeat labs in the morning  Avoid nephrotoxins, hypotensive episodes  Monitor I's and O's and daily weights

## 2023-12-18 NOTE — QUICK NOTE
"MEDICAL DETOX UNIT, LEVEL 4  Department of Medical Toxicology    Principal Problem: fall    Assessing Provider: Samanta Figueroa PA-C    12/18/23    ASSESSMENT:    Mary Willoughby is a 58 y.o. female who presents to [facility] ED for evaluation of head injury following possible syncopal episode. On chart review, patient was recently admitted to [facility] 11/19-11/22/2023 for finger infection requiring I&D/washout and IV antibiotics. Alcohol use was not noted during that admission, patient did not require medication specifically for withdrawal during that admission.     Case reviewed with ED provider William Zepeda PA-C. Per discussion, patient has a h/o alcohol use but was sober for several years until relapse last week. Does not have h/o withdrawal seizures. Was on a \"murcia\" consuming a bottle of liquor daily with last alcohol consumption Saturday 12/16/2023. Per report, patient was supposed to be picked up by a friend this afternoon. When patient's friend arrived around 2 pm, patient was found down on the sidewalk. Patient presented to ED with contusion on forehead, CT head and cervical spine negative.    Vitals:    12/18/23 1545   BP: 97/61   Pulse: 90   Resp:    Temp:    SpO2: 97%     Recent Labs     12/18/23  1539   WBC 4.81   HGB 11.7   HCT 35.3      SODIUM 138   K 3.8   CO2 28   AST 26   ALT 16   ETOH <10      RECOMMENDATIONS:    Per discussion with MILTON Zepeda, cause of syncope is unclear. Patient currently is not exhibiting signs of active withdrawal, vitals currently stable. Labs only notable for COLIN.    At this time, given limited recent alcohol use and patient clinically appears overall stable and 2 days have surpassed since last reported alcohol consumption, patient is at low risk for severe/complicated withdrawal at this time. Would recommend medical admission for management of COLIN. Monitor on CIWA. Can consider toxicology consult if patient develops severe withdrawal not " controlled on CIWA.

## 2023-12-18 NOTE — ASSESSMENT & PLAN NOTE
Patient presents following an episode of alcohol binging last week with last drink noted to be on Saturday  Patient currently endorsing some anxiety and mild tremors and feeling of uneasiness  Placed on CIWA protocol  Zofran for nausea  IVF hydration  Thiamine, folate and MV ordered

## 2023-12-18 NOTE — PLAN OF CARE
Problem: Potential for Falls  Goal: Patient will remain free of falls  Description: INTERVENTIONS:  - Educate patient/family on patient safety including physical limitations  - Instruct patient to call for assistance with activity   - Consult OT/PT to assist with strengthening/mobility   - Keep Call bell within reach  - Keep bed low and locked with side rails adjusted as appropriate  - Keep care items and personal belongings within reach  - Initiate and maintain comfort rounds  - Make Fall Risk Sign visible to staff  - Apply yellow socks and bracelet for high fall risk patients  - Consider moving patient to room near nurses station  Outcome: Progressing     Problem: PAIN - ADULT  Goal: Verbalizes/displays adequate comfort level or baseline comfort level  Description: Interventions:  - Encourage patient to monitor pain and request assistance  - Assess pain using appropriate pain scale  - Administer analgesics based on type and severity of pain and evaluate response  - Implement non-pharmacological measures as appropriate and evaluate response  - Consider cultural and social influences on pain and pain management  - Notify physician/advanced practitioner if interventions unsuccessful or patient reports new pain  Outcome: Progressing     Problem: METABOLIC, FLUID AND ELECTROLYTES - ADULT  Goal: Electrolytes maintained within normal limits  Description: INTERVENTIONS:  - Monitor labs and assess patient for signs and symptoms of electrolyte imbalances  - Administer electrolyte replacement as ordered  - Monitor response to electrolyte replacements, including repeat lab results as appropriate  - Instruct patient on fluid and nutrition as appropriate  Outcome: Progressing

## 2023-12-18 NOTE — H&P
[facility]  H&P  Name: Mary Willoughby 58 y.o. female I MRN: [id number]  Unit/Bed#: E5 -01 I Date of Admission: 12/18/2023   Date of Service: 12/18/2023 I Hospital Day: 0      Assessment/Plan   * Syncope and collapse  Assessment & Plan  Patient found down on her sidewalk by her mailbox by her neighbor, patient with no recollection of the event  Unclear etiology however patient admits to having been binge drinking again last week after 5 years of having stopped alcohol  States she started feeling uneasy and like she was going into withdrawal on Sunday with last drink on Saturday  ED discussed with toxicology as initial impression was withdrawal seizure however this was unlikely as per toxicology given the lack of withdrawal symptoms at this time  CTH with no acute intracranial abnormality  On labs, patient with COLIN, no other apparent metabolic abnormalities  Will place patient on telemetry at this time and continue to monitor symptoms  Continue to monitor for withdrawal      Alcohol withdrawal syndrome without complication (HCC)  Assessment & Plan  Patient presents following an episode of alcohol binging last week with last drink noted to be on Saturday  Patient currently endorsing some anxiety and mild tremors and feeling of uneasiness  Placed on CIWA protocol  Zofran for nausea  IVF hydration  Thiamine, folate and MV ordered    COLIN (acute kidney injury) (HCC)  Assessment & Plan  Patient presents with creatinine of 1.38 from baseline of 0.5-0.7 in setting of decreased p.o. intake  Likely prerenal in etiology  Start patient on IV fluid hydration and repeat labs in the morning  Avoid nephrotoxins, hypotensive episodes  Monitor I's and O's and daily weights    Bipolar disorder (HCC)  Assessment & Plan  Continue prozac and seroquel  Currently denies any suicidal ideations or any active anxiety    Hypothyroidism  Assessment & Plan  Continue levothyroxine         VTE Pharmacologic  Prophylaxis:   Moderate Risk (Score 3-4) - Pharmacological DVT Prophylaxis Ordered: enoxaparin (Lovenox).  Code Status: Level 1 - Full Code   Discussion with family: Patient declined call to .     Anticipated Length of Stay: Patient will be admitted on an observation basis with an anticipated length of stay of less than 2 midnights secondary to EtOH withdrawal.    Total Time Spent on Date of Encounter in care of patient:  mins. This time was spent on one or more of the following: performing physical exam; counseling and coordination of care; obtaining or reviewing history; documenting in the medical record; reviewing/ordering tests, medications or procedures; communicating with other healthcare professionals and discussing with patient's family/caregivers.    Chief Complaint: fall/found down by neighbor    History of Present Illness:  Mary Willoughyb is a 58 y.o. female with a PMH of EtOH abuse (previously in sober x 5 years), hypothyroidism, bipolar disorder, anxiety  who presents for suspected syncopal episode. Patient states that she had recently been admitted to the hospital for a finger infection and since her discharge she had been feeling a lot of anxiety and states that she tried to double up on her seroquel and ativan and that this may have caused her to feel funny and off. She also stated that she had been sober and off alcohol for 5 years up until last week when she was feeling anxious and she told herself a little drink to calm her nerves wouldn't hurt and she had episodes of binge drinking. Her last drink was Saturday and on Sunday she stated that she started feeling the shakes. She noted that she had an appointment with her friend this afternoon however she was found down by her friend by the mailbox unconscious. Patient states that the last thing she remembers was going to the mailbox to check on her mail. She did not remember passing out or anything however stated that she has been  "feeling \"off\" since Sunday and that it could have been that she had taken too much seroquel and ativan.    At the time of the encounter, patient states that she is feeling better however still feels \"out of it\". Denies any nausea, tremors, headache, palpitations at this time.    Of note, patient was recently admitted at behavioral health due to suicidal ideations however currently denies this.     Review of Systems:  Review of Systems   Constitutional:  Negative for chills and fever.   HENT:  Negative for ear pain and sore throat.    Eyes:  Negative for pain and visual disturbance.   Respiratory:  Negative for cough and shortness of breath.    Cardiovascular:  Negative for chest pain and palpitations.   Gastrointestinal:  Negative for abdominal pain and vomiting.   Genitourinary:  Negative for dysuria and hematuria.   Musculoskeletal:  Negative for arthralgias and back pain.   Skin:  Negative for color change and rash.   Neurological:  Negative for seizures and syncope.   Psychiatric/Behavioral:  Negative for self-injury and suicidal ideas. The patient is nervous/anxious.    All other systems reviewed and are negative.        Past Medical and Surgical History:   Past Medical History:   Diagnosis Date    Ankle fracture     Attention deficit disorder 11/01/2011    Bipolar 1 disorder (HCC)     Depression     Depression 07/19/2021    Disease of thyroid gland     Foot fracture, left     Hypothyroidism 07/19/2021    Pneumonia 12/2008    Psychiatric disorder     Renal disorder     Tachycardia 12/2008       Past Surgical History:   Procedure Laterality Date    GASTRIC BYPASS  01/25/2016    HAND DEBRIDEMENT Right 11/19/2023    Procedure: right index finger abscess and flexor sheath irrigation and debridement;  Surgeon: Jose Guadalupe Crowley MD;  Location: [facility];  Service: Orthopedics    ALIS-EN-Y PROCEDURE         Meds/Allergies:  Prior to Admission medications    Medication Sig Start Date End Date Taking? Authorizing " Provider   acetaminophen (TYLENOL) 650 mg CR tablet Take 2 tablets (1,300 mg total) by mouth every 8 (eight) hours as needed for moderate pain 11/22/23  Yes Asif Myers MD   B Complex Vitamins (Vitamin B Complex) TABS Take 2 tablets by mouth daily 11/8/21  Yes Jada Daley PA-C   FLUoxetine (PROzac) 40 MG capsule 2 (two) times a day 11/20/18  Yes Historical Provider, MD   FLUoxetine (PROzac) 40 MG capsule Take 1 capsule (40 mg total) by mouth 2 (two) times a day 11/22/23 2/20/24 Yes Amber Preston   levothyroxine 175 mcg tablet take 1 tablet by mouth once daily 7/11/23  Yes Jada Daley PA-C   LORazepam (ATIVAN) 0.5 mg tablet  1/4/19  Yes Historical Provider, MD   Multiple Vitamin (Multivitamin Adult) TABS Take 2 tablets by mouth daily 9/2/21  Yes Jada Daley PA-C   nystatin (MYCOSTATIN) powder Apply topically 2 (two) times a day 11/22/23  Yes Amber Preston   QUEtiapine (SEROquel) 100 mg tablet Take 1 tablet (100 mg total) by mouth daily 11/22/23  Yes Amber Preston   QUEtiapine (SEROquel) 300 mg tablet 300 mg 2 (two) times a day 11/20/18  Yes Historical Provider, MD   QUEtiapine (SEROquel) 300 mg tablet Take 2 tablets (600 mg total) by mouth daily at bedtime 11/22/23 2/20/24 Yes Amber Preston   Calcium Citrate-Vitamin D (CALCET CREAMY BITES) 500-400 MG-UNIT CHEW 2 to 3 daily  Patient not taking: Reported on 11/19/2023 11/3/16   Historical Provider, MD   cetirizine (ZyrTEC) 10 mg tablet Take 1 tablet (10 mg total) by mouth daily  Patient not taking: Reported on 11/19/2023 11/8/21   Jada Daley PA-C   Cyanocobalamin (B-12) 5000 MCG CAPS every 24 hours  Patient not taking: Reported on 11/19/2023 11/3/16   Historical Provider, MD   ergocalciferol (VITAMIN D2) 50,000 units Take 1 capsule (50,000 Units total) by mouth once a week  Patient not taking: Reported on 11/19/2023 11/8/21   Jada Daley PA-C   ferrous sulfate 324 (65 Fe) mg Take 1 tablet  "(324 mg total) by mouth 3 (three) times a day before meals  Patient not taking: Reported on 11/19/2023 11/8/21   Jada Daley PA-C   levothyroxine 175 mcg tablet Take 1 tablet (175 mcg total) by mouth daily Do not start before November 23, 2023.  Patient not taking: Reported on 12/8/2023 11/23/23 2/21/24  Amber Preston   LORazepam (ATIVAN) 0.5 mg tablet Take 1 tablet (0.5 mg total) by mouth daily as needed for anxiety  Patient not taking: Reported on 12/8/2023 11/22/23 12/22/23  Amber Preston     I have reviewed home medications with patient personally.    Allergies: No Known Allergies    Social History:  Marital Status: Single   Patient Pre-hospital Living Situation: Home  Patient Pre-hospital Level of Mobility: walks  Patient Pre-hospital Diet Restrictions: none  Substance Use History:   Social History     Substance and Sexual Activity   Alcohol Use Never     Social History     Tobacco Use   Smoking Status Never   Smokeless Tobacco Never     Social History     Substance and Sexual Activity   Drug Use Never       Family History:  Family History   Adopted: Yes       Physical Exam:     Vitals:   Blood Pressure: 108/67 (12/18/23 1834)  Pulse: 79 (12/18/23 1834)  Temperature: 98 °F (36.7 °C) (12/18/23 1834)  Temp Source: Oral (12/18/23 1834)  Respirations: 18 (12/18/23 1834)  Height: 5' 1\" (154.9 cm) (12/18/23 1837)  Weight - Scale: 64.4 kg (141 lb 15.6 oz) (12/18/23 1837)  SpO2: 98 % (12/18/23 1837)    Physical Exam  Vitals and nursing note reviewed.   Constitutional:       General: She is not in acute distress.     Appearance: She is well-developed.   HENT:      Head: Normocephalic and atraumatic.   Eyes:      Conjunctiva/sclera: Conjunctivae normal.   Cardiovascular:      Rate and Rhythm: Normal rate and regular rhythm.      Heart sounds: No murmur heard.  Pulmonary:      Effort: Pulmonary effort is normal. No respiratory distress.      Breath sounds: Normal breath sounds.   Abdominal:      " Palpations: Abdomen is soft.      Tenderness: There is no abdominal tenderness.   Musculoskeletal:         General: No swelling.      Cervical back: Neck supple.   Skin:     General: Skin is warm and dry.      Capillary Refill: Capillary refill takes less than 2 seconds.   Neurological:      Mental Status: She is alert.   Psychiatric:         Mood and Affect: Mood normal.        Additional Data:     Lab Results:  Results from last 7 days   Lab Units 12/18/23  1539   WBC Thousand/uL 4.81   HEMOGLOBIN g/dL 11.7   HEMATOCRIT % 35.3   PLATELETS Thousands/uL 166   NEUTROS PCT % 65   LYMPHS PCT % 22   MONOS PCT % 8   EOS PCT % 2     Results from last 7 days   Lab Units 12/18/23  1539   SODIUM mmol/L 138   POTASSIUM mmol/L 3.8   CHLORIDE mmol/L 102   CO2 mmol/L 28   BUN mg/dL 29*   CREATININE mg/dL 1.38*   ANION GAP mmol/L 8   CALCIUM mg/dL 8.3*   ALBUMIN g/dL 3.5   TOTAL BILIRUBIN mg/dL 0.34   ALK PHOS U/L 94   ALT U/L 16   AST U/L 26   GLUCOSE RANDOM mg/dL 110                       Lines/Drains:  Invasive Devices       Peripheral Intravenous Line  Duration             Peripheral IV 12/18/23 Right Antecubital <1 day              Drain  Duration             Open Drain Anterior;Right Hand 29 days                        Imaging: Reviewed radiology reports from this admission including: CT head and CT spine  CT head without contrast   Final Result by Sim Sparks MD (12/18 1613)      No acute intracranial abnormality.                  Workstation performed: [id number]         CT spine cervical without contrast   Final Result by Sim Sparks MD (12/18 1616)      No cervical spine fracture or traumatic malalignment.                  Workstation performed: [id number]             EKG and Other Studies Reviewed on Admission:   EKG: NSR. HR 65.    ** Please Note: This note has been constructed using a voice recognition system. **

## 2023-12-18 NOTE — ASSESSMENT & PLAN NOTE
Patient found down on her sidewalk by her mailbox by her neighbor, patient with no recollection of the event  Unclear etiology however patient admits to having been binge drinking again last week after 5 years of having stopped alcohol  States she started feeling uneasy and like she was going into withdrawal on Sunday with last drink on Saturday  ED discussed with toxicology as initial impression was withdrawal seizure however this was unlikely as per toxicology given the lack of withdrawal symptoms at this time  CTH with no acute intracranial abnormality  On labs, patient with COLIN, no other apparent metabolic abnormalities  Will place patient on telemetry at this time and continue to monitor symptoms  Continue to monitor for withdrawal

## 2023-12-18 NOTE — ED PROVIDER NOTES
History  Chief Complaint   Patient presents with    Fall     Pt came in via EMS, per EMS friend found pt on the floor. Bump noted to R-side of head, pt denies any other symptoms.      This is a 58-year-old female patient brought in by EMS.  According to EMS she was found down on the sidewalk with a bump on the right side of her head.  She is alert and oriented currently.  States the last thing she was remembered was talking to her friend on the phone been going out to get her mail.  She then members being in an ambulance.  Her friend is now bedside who states that she spoke to her at 2:02 PM stating that she was looking for a parking space and then when she arrived to her it might have been approximately 210 and she was laying on the ground on her right side at the mall over the ground she was alert to voice but not speaking.  Patient admits to that she was an alcoholic and has been on a murica for the last week drinking a bottle of alcohol a day and stopped approximately a day and a half ago and has not been feeling well.  Patient offers no complaints she does have a hematoma/contusion on the right side of her head.  Denies homicidal suicidal ideations or she does not recall falling she has never had DTs in the past.  There is a possibility that this is a withdrawal seizure versus a syncopal episode.  I am going to give her some Valium and I contacted toxicology.  She will have a CT of her head and neck and a syncope workup.        Prior to Admission Medications   Prescriptions Last Dose Informant Patient Reported? Taking?   B Complex Vitamins (Vitamin B Complex) TABS   No Yes   Sig: Take 2 tablets by mouth daily   Calcium Citrate-Vitamin D (CALCET CREAMY BITES) 500-400 MG-UNIT CHEW Not Taking Self Yes No   Si to 3 daily   Patient not taking: Reported on 2023   Cyanocobalamin (B-12) 5000 MCG CAPS Not Taking Self Yes No   Sig: every 24 hours   Patient not taking: Reported on 2023   FLUoxetine  (PROzac) 40 MG capsule  Self Yes Yes   Si (two) times a day   FLUoxetine (PROzac) 40 MG capsule   No Yes   Sig: Take 1 capsule (40 mg total) by mouth 2 (two) times a day   LORazepam (ATIVAN) 0.5 mg tablet  Self Yes Yes   LORazepam (ATIVAN) 0.5 mg tablet   No No   Sig: Take 1 tablet (0.5 mg total) by mouth daily as needed for anxiety   Patient not taking: Reported on 2023   Multiple Vitamin (Multivitamin Adult) TABS   No Yes   Sig: Take 2 tablets by mouth daily   QUEtiapine (SEROquel) 100 mg tablet   No Yes   Sig: Take 1 tablet (100 mg total) by mouth daily   QUEtiapine (SEROquel) 300 mg tablet  Self Yes Yes   Si mg 2 (two) times a day   QUEtiapine (SEROquel) 300 mg tablet   No Yes   Sig: Take 2 tablets (600 mg total) by mouth daily at bedtime   acetaminophen (TYLENOL) 650 mg CR tablet   No Yes   Sig: Take 2 tablets (1,300 mg total) by mouth every 8 (eight) hours as needed for moderate pain   cetirizine (ZyrTEC) 10 mg tablet Not Taking  No No   Sig: Take 1 tablet (10 mg total) by mouth daily   Patient not taking: Reported on 2023   ergocalciferol (VITAMIN D2) 50,000 units Not Taking  No No   Sig: Take 1 capsule (50,000 Units total) by mouth once a week   Patient not taking: Reported on 2023   ferrous sulfate 324 (65 Fe) mg Not Taking  No No   Sig: Take 1 tablet (324 mg total) by mouth 3 (three) times a day before meals   Patient not taking: Reported on 2023   levothyroxine 175 mcg tablet   No Yes   Sig: take 1 tablet by mouth once daily   levothyroxine 175 mcg tablet   No No   Sig: Take 1 tablet (175 mcg total) by mouth daily Do not start before 2023.   Patient not taking: Reported on 2023   nystatin (MYCOSTATIN) powder   No Yes   Sig: Apply topically 2 (two) times a day      Facility-Administered Medications: None       Past Medical History:   Diagnosis Date    Ankle fracture     Attention deficit disorder 2011    Bipolar 1 disorder (HCC)     Depression      Depression 07/19/2021    Disease of thyroid gland     Foot fracture, left     Hypothyroidism 07/19/2021    Pneumonia 12/2008    Psychiatric disorder     Renal disorder     Tachycardia 12/2008       Past Surgical History:   Procedure Laterality Date    GASTRIC BYPASS  01/25/2016    HAND DEBRIDEMENT Right 11/19/2023    Procedure: right index finger abscess and flexor sheath irrigation and debridement;  Surgeon: Jose Guadlaupe Crowley MD;  Location: [facility];  Service: Orthopedics    ALIS-EN-Y PROCEDURE         Family History   Adopted: Yes     I have reviewed and agree with the history as documented.    E-Cigarette/Vaping     E-Cigarette/Vaping Substances     Social History     Tobacco Use    Smoking status: Never    Smokeless tobacco: Never   Substance Use Topics    Alcohol use: Never    Drug use: Never       Review of Systems   Constitutional:  Negative for chills, diaphoresis, fatigue and fever.   HENT:  Negative for congestion, ear pain, nosebleeds and sore throat.    Eyes:  Negative for photophobia, pain, discharge and visual disturbance.   Respiratory:  Negative for cough, choking, chest tightness, shortness of breath and wheezing.    Cardiovascular:  Negative for chest pain and palpitations.   Gastrointestinal:  Negative for abdominal distention, abdominal pain, diarrhea and vomiting.   Genitourinary:  Negative for dysuria, flank pain and frequency.   Musculoskeletal:  Negative for back pain, gait problem and joint swelling.   Skin:  Negative for color change and rash.   Neurological:  Negative for dizziness, tremors, seizures, syncope, facial asymmetry, speech difficulty, weakness, light-headedness, numbness and headaches.   Psychiatric/Behavioral:  Negative for behavioral problems, confusion and suicidal ideas. The patient is not nervous/anxious.    All other systems reviewed and are negative.      Physical Exam  Physical Exam  Vitals and nursing note reviewed.   Constitutional:       General: She is not in  acute distress.     Appearance: Normal appearance. She is not ill-appearing, toxic-appearing or diaphoretic.   HENT:      Head: Normocephalic.        Right Ear: Tympanic membrane, ear canal and external ear normal.      Left Ear: Tympanic membrane, ear canal and external ear normal.      Nose: Nose normal. No congestion or rhinorrhea.      Mouth/Throat:      Mouth: Mucous membranes are dry.      Pharynx: Oropharynx is clear. No oropharyngeal exudate or posterior oropharyngeal erythema.   Eyes:      Extraocular Movements: Extraocular movements intact.      Conjunctiva/sclera: Conjunctivae normal.      Pupils: Pupils are equal, round, and reactive to light.   Neck:     Cardiovascular:      Rate and Rhythm: Normal rate and regular rhythm.   Pulmonary:      Effort: Pulmonary effort is normal. No respiratory distress.      Breath sounds: Normal breath sounds.   Abdominal:      General: Bowel sounds are normal.      Palpations: Abdomen is soft.      Tenderness: There is no abdominal tenderness.   Musculoskeletal:         General: Normal range of motion.      Cervical back: Normal range of motion and neck supple. No rigidity or tenderness.      Right lower leg: No edema.      Left lower leg: No edema.   Lymphadenopathy:      Cervical: No cervical adenopathy.   Skin:     General: Skin is warm and dry.      Capillary Refill: Capillary refill takes less than 2 seconds.      Findings: No rash.   Neurological:      General: No focal deficit present.      Mental Status: She is alert and oriented to person, place, and time. Mental status is at baseline.   Psychiatric:         Mood and Affect: Mood normal.         Behavior: Behavior normal.         Vital Signs  ED Triage Vitals [12/18/23 1437]   Temperature Pulse Respirations Blood Pressure SpO2   97.7 °F (36.5 °C) 94 18 107/69 95 %      Temp Source Heart Rate Source Patient Position - Orthostatic VS BP Location FiO2 (%)   Oral Monitor Lying Right arm --      Pain Score       No  Pain           Vitals:    12/18/23 1437 12/18/23 1545   BP: 107/69 97/61   Pulse: 94 90   Patient Position - Orthostatic VS: Lying Lying         Visual Acuity      ED Medications  Medications   sodium chloride 0.9 % infusion (150 mL/hr Intravenous New Bag 12/18/23 1654)   diazepam (VALIUM) injection 2.5 mg (2.5 mg Intravenous Given 12/18/23 1538)   sodium chloride 0.9 % bolus 1,000 mL (0 mL Intravenous Stopped 12/18/23 1655)       Diagnostic Studies  Results Reviewed       Procedure Component Value Units Date/Time    HS Troponin I 2hr [[id number]]     Lab Status: No result Specimen: Blood     HS Troponin I 4hr [[id number]]     Lab Status: No result Specimen: Blood     HS Troponin 0hr (reflex protocol) [[id number]]  (Normal) Collected: 12/18/23 1539    Lab Status: Final result Specimen: Blood from Arm, Right Updated: 12/18/23 1609     hs TnI 0hr <2 ng/L     Comprehensive metabolic panel [[id number]]  (Abnormal) Collected: 12/18/23 1539    Lab Status: Final result Specimen: Blood from Arm, Right Updated: 12/18/23 1608     Sodium 138 mmol/L      Potassium 3.8 mmol/L      Chloride 102 mmol/L      CO2 28 mmol/L      ANION GAP 8 mmol/L      BUN 29 mg/dL      Creatinine 1.38 mg/dL      Glucose 110 mg/dL      Calcium 8.3 mg/dL      AST 26 U/L      ALT 16 U/L      Alkaline Phosphatase 94 U/L      Total Protein 6.0 g/dL      Albumin 3.5 g/dL      Total Bilirubin 0.34 mg/dL      eGFR 42 ml/min/1.73sq m     Narrative:      National Kid[employer] Foundation guidelines for Chronic Kidney Disease (CKD):     Stage 1 with normal or high GFR (GFR > 90 mL/min/1.73 square meters)    Stage 2 Mild CKD (GFR = 60-89 mL/min/1.73 square meters)    Stage 3A Moderate CKD (GFR = 45-59 mL/min/1.73 square meters)    Stage 3B Moderate CKD (GFR = 30-44 mL/min/1.73 square meters)    Stage 4 Severe CKD (GFR = 15-29 mL/min/1.73 square meters)    Stage 5 End Stage CKD (GFR <15 mL/min/1.73 square meters)  Note: GFR calculation is accurate only with a  steady state creatinine    Ethanol [[id number]]  (Normal) Collected: 12/18/23 1539    Lab Status: Final result Specimen: Blood from Arm, Right Updated: 12/18/23 1607     Ethanol Lvl <10 mg/dL     CBC and differential [[id number]]  (Abnormal) Collected: 12/18/23 1539    Lab Status: Final result Specimen: Blood from Arm, Right Updated: 12/18/23 1548     WBC 4.81 Thousand/uL      RBC 3.78 Million/uL      Hemoglobin 11.7 g/dL      Hematocrit 35.3 %      MCV 93 fL      MCH 31.0 pg      MCHC 33.1 g/dL      RDW 13.1 %      MPV 10.0 fL      Platelets 166 Thousands/uL      nRBC 0 /100 WBCs      Neutrophils Relative 65 %      Immat GRANS % 2 %      Lymphocytes Relative 22 %      Monocytes Relative 8 %      Eosinophils Relative 2 %      Basophils Relative 1 %      Neutrophils Absolute 3.18 Thousands/µL      Immature Grans Absolute 0.08 Thousand/uL      Lymphocytes Absolute 1.04 Thousands/µL      Monocytes Absolute 0.36 Thousand/µL      Eosinophils Absolute 0.11 Thousand/µL      Basophils Absolute 0.04 Thousands/µL     Rapid drug screen, urine [[id number]]     Lab Status: No result Specimen: Urine                    CT head without contrast   Final Result by Sim Sparks MD (12/18 1613)      No acute intracranial abnormality.                  Workstation performed: [id number]         CT spine cervical without contrast   Final Result by Sim Sparks MD (12/18 1616)      No cervical spine fracture or traumatic malalignment.                  Workstation performed: [id number]                    Procedures  Procedures         ED Course  ED Course as of 12/18/23 1702   Mon Dec 18, 2023   1617 Creatinine(!): 1.38   1618 BUN(!): 29   1618 3 weeks ago creatinine 0.72 delta 0.66   1620 Spoke with Samanta from detox explained case in detail is unclear whether patient had withdrawal seizure with major syncopize she does have an acute kidney injury and will be admitted to [facility] with CIWA precautions                                              Medical Decision Making  58-year-old female patient who was found down on the curb after she picked up her mail.  Easily arousable do not know what caused her to pass out.  She was going to her mailbox and then members being in the ambulance.  Does have a history of alcoholism and fell off the wagon last week and has not had a drink in about a day and a half.  It is unclear whether she had a seizure or not however she did not have any loss of bowel or bladder.  Not suicidal or homicidal.  Denies any complaints.  Differential diagnose includes not limited to syncope secondary to withdrawal seizure, cardiac origin, vasovagal.    Problems Addressed:  COLIN (acute kidney injury) (HCC): acute illness or injury     Details: It was found that the patient had a rise in 0.66 from baseline of his her creatinine.  Which qualifies for acute kidney injury should be admitted for fluids  Alcohol withdrawal (HCC): acute illness or injury     Details: It is unclear whether she had a withdrawal seizure.  Her vital signs are very normal and she is not edgy or acting like she is withdrawing from alcohol however with her history of being an alcoholic and the fact that she drank heavily for an entire week and then abruptly stopped it is a possibility of a differential.  I spoke with toxicology who felt she should be admitted on [facility] protocol but does not require admission to the toxicology unit at this time.  She will be [facility] to [facility] for further evaluation  Injury of head, initial encounter: acute illness or injury     Details: CT of the head revealed no acute finding such as bleed or fracture did show contusion right side of head which is consistent with exam  Syncope: acute illness or injury     Details: From the story received from her friend who found her on the ground it does sound like she syncopized at this time I do not have the exact reason she will be admitted for  "further evaluation    Amount and/or Complexity of Data Reviewed  Independent Historian: friend and EMS     Details: The events leading to this issue were provided by EMS and her friend.  External Data Reviewed: notes.     Details: Reviewed previous notes and labs for comparison and trending purposes  Labs: ordered. Decision-making details documented in ED Course.     Details: All labs were reviewed as noted in my previous note has elevation in base creatinine delta will require fluids.  BUN is also elevated 0.5 patient has acute kidney injury.  BUN also elevated no other acute findings that require immediate intervention  Radiology: ordered.     Details: I reviewed the patient's CAT scan read of head and cervical spine there were no acute findings  ECG/medicine tests: ordered and independent interpretation performed.     Details: I did interpret the EKG 81 bpm normal sinus rhythm no ST elevation left axis deviation QTc 490 little more prolonged than previous patient has ongoing T wave depressions in anterior lateral leads as previous.  Discussion of management or test interpretation with external provider(s): Using joint decision-making with patient, toxicology and Dr. Sharpe patient be admitted to the hospital for acute kidney injury and on [facility] protocol to determine if she is withdrawing from alcohol.    Risk  Prescription drug management.  Decision regarding hospitalization.  Risk Details: Portions of the record may have been created with voice recognition software. Occasional wrong word or \"sound a like\" substitutions may have occurred due to the inherent limitations of voice recognition software. Read the chart carefully and recognize, using context, where substitutions have occurred.                    Disposition  Final diagnoses:   Alcohol withdrawal (HCC)   Syncope   Injury of head, initial encounter   COLIN (acute kidney injury) (HCC)     Time reflects when diagnosis was documented in both MDM as applicable " and the Disposition within this note       Time User Action Codes Description Comment    12/18/2023  3:18 PM William Ham [F10.939] Alcohol withdrawal (HCC)     12/18/2023  4:19 PM William Ham [R55] Syncope     12/18/2023  4:19 PM William Ham [S09.90XA] Injury of head, initial encounter     12/18/2023  4:38 PM William Ham [N17.9] COLIN (acute kidney injury) (HCC)           ED Disposition       ED Disposition   Admit    Condition   Stable    Date/Time   Mon Dec 18, 2023  4:37 PM    Comment   Case was discussed with Dr. Mcintosh and the patient's admission status was agreed to be Admission Status: observation status to the service of Dr. Mcintosh .               Follow-up Information    None         Patient's Medications   Discharge Prescriptions    No medications on file       No discharge procedures on file.    PDMP Review         Value Time User    PDMP Reviewed  Yes 11/22/2023  2:51 PM Zayda Mchugh DO            ED Provider  Electronically Signed by             William Ham PA-C  12/18/23 5837

## 2023-12-18 NOTE — ED NOTES
Pt aware that urine is needed, pt unable to provide urine at this time.      Ailyn Benavidez RN  12/18/23 0891

## 2023-12-19 VITALS
DIASTOLIC BLOOD PRESSURE: 63 MMHG | RESPIRATION RATE: 18 BRPM | OXYGEN SATURATION: 99 % | WEIGHT: 141.98 LBS | BODY MASS INDEX: 26.81 KG/M2 | TEMPERATURE: 97.8 F | SYSTOLIC BLOOD PRESSURE: 89 MMHG | HEART RATE: 71 BPM | HEIGHT: 61 IN

## 2023-12-19 LAB
ALBUMIN SERPL BCP-MCNC: 2.9 G/DL (ref 3.5–5)
ALP SERPL-CCNC: 69 U/L (ref 34–104)
ALT SERPL W P-5'-P-CCNC: 12 U/L (ref 7–52)
ANION GAP SERPL CALCULATED.3IONS-SCNC: 5 MMOL/L
AST SERPL W P-5'-P-CCNC: 22 U/L (ref 13–39)
BASOPHILS # BLD AUTO: 0.05 THOUSANDS/ÂΜL (ref 0–0.1)
BASOPHILS NFR BLD AUTO: 1 % (ref 0–1)
BILIRUB SERPL-MCNC: 0.34 MG/DL (ref 0.2–1)
BUN SERPL-MCNC: 23 MG/DL (ref 5–25)
CALCIUM ALBUM COR SERPL-MCNC: 8.4 MG/DL (ref 8.3–10.1)
CALCIUM SERPL-MCNC: 7.5 MG/DL (ref 8.4–10.2)
CHLORIDE SERPL-SCNC: 106 MMOL/L (ref 96–108)
CO2 SERPL-SCNC: 28 MMOL/L (ref 21–32)
CREAT SERPL-MCNC: 0.84 MG/DL (ref 0.6–1.3)
EOSINOPHIL # BLD AUTO: 0.28 THOUSAND/ÂΜL (ref 0–0.61)
EOSINOPHIL NFR BLD AUTO: 4 % (ref 0–6)
ERYTHROCYTE [DISTWIDTH] IN BLOOD BY AUTOMATED COUNT: 13.2 % (ref 11.6–15.1)
GFR SERPL CREATININE-BSD FRML MDRD: 77 ML/MIN/1.73SQ M
GLUCOSE SERPL-MCNC: 74 MG/DL (ref 65–140)
HCT VFR BLD AUTO: 34.4 % (ref 34.8–46.1)
HGB BLD-MCNC: 11.3 G/DL (ref 11.5–15.4)
IMM GRANULOCYTES # BLD AUTO: 0.07 THOUSAND/UL (ref 0–0.2)
IMM GRANULOCYTES NFR BLD AUTO: 1 % (ref 0–2)
LYMPHOCYTES # BLD AUTO: 1.7 THOUSANDS/ÂΜL (ref 0.6–4.47)
LYMPHOCYTES NFR BLD AUTO: 26 % (ref 14–44)
MAGNESIUM SERPL-MCNC: 2.3 MG/DL (ref 1.9–2.7)
MCH RBC QN AUTO: 31.2 PG (ref 26.8–34.3)
MCHC RBC AUTO-ENTMCNC: 32.8 G/DL (ref 31.4–37.4)
MCV RBC AUTO: 95 FL (ref 82–98)
MONOCYTES # BLD AUTO: 0.43 THOUSAND/ÂΜL (ref 0.17–1.22)
MONOCYTES NFR BLD AUTO: 7 % (ref 4–12)
NEUTROPHILS # BLD AUTO: 3.98 THOUSANDS/ÂΜL (ref 1.85–7.62)
NEUTS SEG NFR BLD AUTO: 61 % (ref 43–75)
NRBC BLD AUTO-RTO: 0 /100 WBCS
PLATELET # BLD AUTO: 162 THOUSANDS/UL (ref 149–390)
PMV BLD AUTO: 10.2 FL (ref 8.9–12.7)
POTASSIUM SERPL-SCNC: 3.6 MMOL/L (ref 3.5–5.3)
PROT SERPL-MCNC: 5 G/DL (ref 6.4–8.4)
RBC # BLD AUTO: 3.62 MILLION/UL (ref 3.81–5.12)
SODIUM SERPL-SCNC: 139 MMOL/L (ref 135–147)
WBC # BLD AUTO: 6.51 THOUSAND/UL (ref 4.31–10.16)

## 2023-12-19 PROCEDURE — 85025 COMPLETE CBC W/AUTO DIFF WBC: CPT | Performed by: STUDENT IN AN ORGANIZED HEALTH CARE EDUCATION/TRAINING PROGRAM

## 2023-12-19 PROCEDURE — 83735 ASSAY OF MAGNESIUM: CPT | Performed by: STUDENT IN AN ORGANIZED HEALTH CARE EDUCATION/TRAINING PROGRAM

## 2023-12-19 PROCEDURE — 99239 HOSP IP/OBS DSCHRG MGMT >30: CPT | Performed by: FAMILY MEDICINE

## 2023-12-19 PROCEDURE — 80053 COMPREHEN METABOLIC PANEL: CPT | Performed by: STUDENT IN AN ORGANIZED HEALTH CARE EDUCATION/TRAINING PROGRAM

## 2023-12-19 RX ORDER — MAGNESIUM SULFATE HEPTAHYDRATE 40 MG/ML
2 INJECTION, SOLUTION INTRAVENOUS ONCE
Status: COMPLETED | OUTPATIENT
Start: 2023-12-19 | End: 2023-12-19

## 2023-12-19 RX ORDER — LANOLIN ALCOHOL/MO/W.PET/CERES
100 CREAM (GRAM) TOPICAL DAILY
Qty: 30 TABLET | Refills: 0 | Status: SHIPPED | OUTPATIENT
Start: 2023-12-20

## 2023-12-19 RX ORDER — FOLIC ACID 1 MG/1
1 TABLET ORAL DAILY
Qty: 30 TABLET | Refills: 0 | Status: SHIPPED | OUTPATIENT
Start: 2023-12-20

## 2023-12-19 RX ADMIN — LORAZEPAM 0.5 MG: 1 TABLET ORAL at 14:38

## 2023-12-19 RX ADMIN — Medication 1 TABLET: at 08:43

## 2023-12-19 RX ADMIN — MAGNESIUM SULFATE HEPTAHYDRATE 2 G: 40 INJECTION, SOLUTION INTRAVENOUS at 00:52

## 2023-12-19 RX ADMIN — FLUOXETINE 40 MG: 20 CAPSULE ORAL at 08:43

## 2023-12-19 RX ADMIN — FOLIC ACID 1 MG: 1 TABLET ORAL at 08:43

## 2023-12-19 RX ADMIN — FERROUS SULFATE TAB 325 MG (65 MG ELEMENTAL FE) 325 MG: 325 (65 FE) TAB at 08:43

## 2023-12-19 RX ADMIN — THIAMINE HCL TAB 100 MG 100 MG: 100 TAB at 08:43

## 2023-12-19 RX ADMIN — ENOXAPARIN SODIUM 40 MG: 40 INJECTION SUBCUTANEOUS at 08:42

## 2023-12-19 RX ADMIN — LEVOTHYROXINE SODIUM 175 MCG: 125 TABLET ORAL at 05:15

## 2023-12-19 RX ADMIN — QUETIAPINE FUMARATE 100 MG: 100 TABLET ORAL at 08:43

## 2023-12-19 RX ADMIN — FLUOXETINE 40 MG: 20 CAPSULE ORAL at 17:26

## 2023-12-19 NOTE — UTILIZATION REVIEW
"Initial Clinical Review    Admission: Date/Time/Statement:   Admission Orders (From admission, onward)       Ordered        12/18/23 1638  Place in Observation  Once                          Orders Placed This Encounter   Procedures    Place in Observation     Standing Status:   Standing     Number of Occurrences:   1     Order Specific Question:   Level of Care     Answer:   Med Surg [16]     ED Arrival Information       Expected   -    Arrival   12/18/2023 14:31    Acuity   Urgent              Means of arrival   Ambulance    Escorted by   [facility] ([facility])    Service   Hospitalist    Admission type   Emergency              Arrival complaint   Fall             Chief Complaint   Patient presents with    Fall     Pt came in via EMS, per EMS friend found pt on the floor. Bump noted to R-side of head, pt denies any other symptoms.        Initial Presentation: 58 y.o. female presents to ED via  EMS  from home after a suspected  syncopal episode.   Found  down  by a firend, unconscious.   Patient states that she had recently been admitted to the hospital for a finger infection and since her discharge she had been feeling a lot of anxiety and states that she tried to double up on her seroquel and ativan and that this may have caused her to feel funny and off.  PMH  is   alcohol abuse, sober for  past 5 years,  started drinking  again in the  past week,  had  episode  of  binge drinking.   Last r collection she has  is going to [facility], found  at [facility].  Has been feeling  \" off\"  for past few days,  feels  it may be  R/T too much seroquel and ativan.  Feels  improved on  exam, but, feels  \" out of it.\"  Additional PMH  is  Bipolar, anxiety and recent admission  in [facility] for  SI.  Ct head  unremarkable.  Labs reveal   elevated creatinine.    Admit  Observation with  Syncope/Collapse, COLIN and  Alcohol withdrawal and plan is  tele,  IVF, CIWA scores,  monitor labs, MVI  and antiemetics as needed.  "           ED Triage Vitals [12/18/23 1437]   Temperature Pulse Respirations Blood Pressure SpO2   97.7 °F (36.5 °C) 94 18 107/69 95 %      Temp Source Heart Rate Source Patient Position - Orthostatic VS BP Location FiO2 (%)   Oral Monitor Lying Right arm --      Pain Score       No Pain          Wt Readings from Last 1 Encounters:   12/18/23 64.4 kg (141 lb 15.6 oz)     Additional Vital Signs:   97.8 °F (36.6 °C) 63 20 104/81 89 99 % -- --    12/19/23 0622 -- 62 -- 92/56 -- -- -- --   12/19/23 03:42:38 97.3 °F (36.3 °C) Abnormal  60 16 90/58 69 97 % None (Room air) Lying   12/19/23 0230 -- 70 -- 90/54 -- -- -- --   12/18/23 22:32:56 97.9 °F (36.6 °C) 71 -- 97/54 68 97 % -- --   12/18/23 1837 -- -- -- -- -- 98 % -- --   12/18/23 18:34:18 98 °F (36.7 °C) 79 18 108/67 81 96 % None (Room air) Lying   12/18/23 1715 -- 76 18 100/71 82 97 % None (Room air) Lying   12/18/23 1545 -- 90 -- 97/61 74 97 % None (Room air) Lying   12/18/23 1445 -- -- -- -- -- -- None (Room air) --   12/18/23 1437 97.7 °F (36.5 °C) 94 18 107/69 -- 95 % None (Room air) Lying     Pertinent Labs/Diagnostic Test Results:   CT head without contrast   Final Result by Sim Sparks MD (12/18 1613)      No acute intracranial abnormality.                  Workstation performed: [id number]         CT spine cervical without contrast   Final Result by Sim Sparks MD (12/18 1616)      No cervical spine fracture or traumatic malalignment.                  Workstation performed: [id number]               Results from last 7 days   Lab Units 12/19/23  0604 12/18/23  1539   WBC Thousand/uL 6.51 4.81   HEMOGLOBIN g/dL 11.3* 11.7   HEMATOCRIT % 34.4* 35.3   PLATELETS Thousands/uL 162 166   NEUTROS ABS Thousands/µL 3.98 3.18         Results from last 7 days   Lab Units 12/19/23  0604 12/18/23  1909 12/18/23  1539   SODIUM mmol/L 139  --  138   POTASSIUM mmol/L 3.6  --  3.8   CHLORIDE mmol/L 106  --  102   CO2 mmol/L 28  --  28   ANION GAP mmol/L 5  --  8    BUN mg/dL 23  --  29*   CREATININE mg/dL 0.84  --  1.38*   EGFR ml/min/1.73sq m 77  --  42   CALCIUM mg/dL 7.5*  --  8.3*   MAGNESIUM mg/dL 2.3 1.4*  --      Results from last 7 days   Lab Units 12/19/23  0604 12/18/23  1539   AST U/L 22 26   ALT U/L 12 16   ALK PHOS U/L 69 94   TOTAL PROTEIN g/dL 5.0* 6.0*   ALBUMIN g/dL 2.9* 3.5   TOTAL BILIRUBIN mg/dL 0.34 0.34         Results from last 7 days   Lab Units 12/19/23  0604 12/18/23  1539   GLUCOSE RANDOM mg/dL 74 110               Results from last 7 days   Lab Units 12/18/23  1539   HS TNI 0HR ng/L <2               Results from last 7 days   Lab Units 12/18/23  2242   AMPH/METH  Negative   BARBITURATE UR  Negative   BENZODIAZEPINE UR  Negative   COCAINE UR  Negative   OPIATE UR  Negative   PCP UR  Negative   THC UR  Positive*     Results from last 7 days   Lab Units 12/18/23  1539   ETHANOL LVL mg/dL <10             ED Treatment:   Medication Administration from 12/18/2023 1431 to 12/18/2023 1830         Date/Time Order Dose Route Action Comments     12/18/2023 1538 EST diazepam (VALIUM) injection 2.5 mg 2.5 mg Intravenous Given --     12/18/2023 1655 EST sodium chloride 0.9 % bolus 1,000 mL 0 mL Intravenous Stopped --     12/18/2023 1604 EST sodium chloride 0.9 % bolus 1,000 mL 1,000 mL Intravenous New Bag --     12/18/2023 1654 EST sodium chloride 0.9 % infusion 150 mL/hr Intravenous New Bag --            Present on Admission:   Hypothyroidism   Bipolar disorder (HCC)      Admitting Diagnosis: Alcohol withdrawal ([facility]) [F10.939]  Syncope [R55]  COLIN (acute kidney injury) ([facility]) [N17.9]  Relational problem due to medical condition [Z63.8]  Injury of head, initial encounter [S09.90XA]  Age/Sex: 58 y.o. female  Admission Orders:  Scheduled Medications:  docusate sodium, 100 mg, Oral, BID  enoxaparin, 40 mg, Subcutaneous, Daily  ferrous sulfate, 325 mg, Oral, Daily With Breakfast  FLUoxetine, 40 mg, Oral, BID  folic acid, 1 mg, Oral, Daily  levothyroxine, 175 mcg,  Oral, Early Morning  multivitamin stress formula, 1 tablet, Oral, Daily  multivitamin-minerals, 1 tablet, Oral, Daily  QUEtiapine, 100 mg, Oral, Daily  QUEtiapine, 600 mg, Oral, HS  senna, 1 tablet, Oral, Daily  thiamine, 100 mg, Oral, Daily      Continuous IV Infusions:  multi-electrolyte, 125 mL/hr, Intravenous, Continuous  sodium chloride, 150 mL/hr, Intravenous, Continuous      PRN Meds:  acetaminophen, 650 mg, Oral, Q6H PRN  LORazepam, 0.5 mg, Oral, Daily PRN  ondansetron, 4 mg, Intravenous, Q6H PRN        IP CONSULT TO TOXICOLOGY    Network Utilization Review Department  ATTENTION: Please call with any questions or concerns to [phone] and carefully listen to the prompts so that you are directed to the right person. All voicemails are confidential.   For Discharge needs, contact Care Management DC Support Team at [phone] opt. [phone]  Send all requests for admission clinical reviews, approved or denied determinations and any other requests to dedicated fax number below belonging to the [place] where the patient is receiving treatment. List of dedicated fax numbers for the Facilities:  FACILITY NAME UR FAX NUMBER   ADMISSION DENIALS (Administrative/Medical Necessity) [phone]   DISCHARGE SUPPORT TEAM (NETWORK) [phone]   PARENT CHILD HEALTH (Maternity/NICU/Pediatrics) [phone]   [facility] [phone]   [facility] [phone]   [facility] [phone]   [facility] [phone]   [facility] [phone]   [facility] [phone]   [facility] [phone]   [facility] [phone]   [facility] [phone]   [facility] [phone]   [facility] [phone]

## 2023-12-19 NOTE — PLAN OF CARE
Problem: Potential for Falls  Goal: Patient will remain free of falls  Description: INTERVENTIONS:  - Educate patient/family on patient safety including physical limitations  - Instruct patient to call for assistance with activity   - Consult OT/PT to assist with strengthening/mobility   - Keep Call bell within reach  - Keep bed low and locked with side rails adjusted as appropriate  - Keep care items and personal belongings within reach  - Initiate and maintain comfort rounds  - Make Fall Risk Sign visible to staff  - Apply yellow socks and bracelet for high fall risk patients  - Consider moving patient to room near nurses station  12/19/2023 0713 by Annette Carreon RN  Outcome: Progressing  12/18/2023 1838 by Annette Carreon RN  Outcome: Progressing     Problem: PAIN - ADULT  Goal: Verbalizes/displays adequate comfort level or baseline comfort level  Description: Interventions:  - Encourage patient to monitor pain and request assistance  - Assess pain using appropriate pain scale  - Administer analgesics based on type and severity of pain and evaluate response  - Implement non-pharmacological measures as appropriate and evaluate response  - Consider cultural and social influences on pain and pain management  - Notify physician/advanced practitioner if interventions unsuccessful or patient reports new pain  12/19/2023 0713 by Annette Carreon RN  Outcome: Progressing  12/18/2023 1838 by Annette Carreon RN  Outcome: Progressing     Problem: METABOLIC, FLUID AND ELECTROLYTES - ADULT  Goal: Electrolytes maintained within normal limits  Description: INTERVENTIONS:  - Monitor labs and assess patient for signs and symptoms of electrolyte imbalances  - Administer electrolyte replacement as ordered  - Monitor response to electrolyte replacements, including repeat lab results as appropriate  - Instruct patient on fluid and nutrition as appropriate  12/19/2023 0713 by Annette Carreon RN  Outcome: Progressing  12/18/2023 1838 by Annette  YULY Carreon  Outcome: Progressing

## 2023-12-19 NOTE — CASE MANAGEMENT
Case Management Assessment & Discharge Planning Note    Patient name Mary Thomas Faix  Location [facility] 5 /E5 -* MRN [id number]  : 1965 Date 2023       Current Admission Date: 2023  Current Admission Diagnosis:Syncope and collapse   Patient Active Problem List    Diagnosis Date Noted    Alcohol withdrawal syndrome without complication (HCC) 2023    Syncope and collapse 2023    COLIN (acute kidney injury) (HCC) 2023    Idiopathic hypotension 2023    Suicidal ideation 2023    Finger infection 2023    Rash 2023    Vitamin D deficiency 2021    Hypothyroidism 2021    History of Arnav-en-Y gastric bypass 2021    Allergic rhinitis 2021    Bipolar disorder (HCC) 2021    Depression 2021    History of diabetes mellitus 2019    Closed bimalleolar fracture of left ankle 2019    Multiple closed fractures of foot, initial encounter 2019    High risk medication use 10/25/2016    History of bariatric surgery 2016    Gastroesophageal reflux disease without esophagitis 2015    Bipolar I disorder, most recent episode (or current) depressed, severe, specified as with psychotic behavior (HCC) 2015    Abnormal liver function tests 2014    Irritable bowel syndrome 2014    Attention deficit disorder 2011    Brachial plexus lesions 2011    Mixed hyperlipidemia 2011    Bipolar affective disorder, depressed, severe, with psychotic behavior (HCC) 2011      LOS (days): 0  Geometric Mean LOS (GMLOS) (days):   Days to GMLOS:     OBJECTIVE:              Current admission status: Observation       Preferred Pharmacy:   [id number] - [place] - [address] [place] [address]  [address]  [address] [place] [address]  [address]  [place] [address]  Phone: [phone] Fax: [phone]    [facility] [place] - [address]  [address]  [place] [address]  Phone: [phone] Fax: [phone]    Primary Care Provider: Jada Daley PA-C    Primary Insurance: [facility]  Secondary Insurance: [employer]    ASSESSMENT:  Active Health Care Proxies    There are no active Health Care Proxies on file.       Readmission Root Cause  30 Day Readmission: No    Patient Information  Admitted from:: Home  Mental Status: Alert  During Assessment patient was accompanied by: Not accompanied during assessment  Assessment information provided by:: Patient  Primary Caregiver: Self  Support Systems: Friends/neighbors, [employer] community  County of Residence: [place]  What [facility] do you live in?: [place]  Home entry access options. Select all that apply.: Stairs  Number of steps to enter home.: One Flight  Do the steps have railings?: Yes  Type of Current Residence: Apartment  Floor Level: 2  Upon entering residence, is there a bedroom on the main floor (no further steps)?: Yes  Upon entering residence, is there a bathroom on the main floor (no further steps)?: Yes  Living Arrangements: Lives Alone  Is patient a ?: No    Activities of Daily Living Prior to Admission  Functional Status: Independent  Completes ADLs independently?: Yes  Ambulates independently?: Yes  Does patient use assisted devices?: No  Does patient currently own DME?: No  Does patient have a history of Outpatient Therapy (PT/OT)?: No  Does the patient have a history of Short-Term Rehab?: No  Does patient have a history of HHC?: No  Does patient currently have HHC?: No    Patient Information Continued  Income Source: SSI/SSD  Does patient have prescription coverage?: Yes  Does patient receive dialysis treatments?: No  Does patient have a history of substance abuse?: Yes  Historical substance use preference: Alcohol/ETOH  History of Withdrawal Symptoms: Other withdrawal symptoms (specify in comment)  Is patient currently in treatment for substance abuse?: No.  Patient declined treatment information.  Does patient have a history of Mental Health Diagnosis?: Yes (Bipolar I, Bipolar Affective Dissorder w/ Depression and Psychotic Features)  Is patient receiving treatment for mental health?: Yes (Medication)  Has patient received inpatient treatment related to mental health in the last 2 years?: No    Means of Transportation  Means of Transport to Appts:: Public Transportation - Bus      Housing Stability: Low Risk  (12/19/2023)    Housing Stability Vital Sign     Unable to Pay for Housing in the Last Year: No     Number of Places Lived in the Last Year: 1     Unstable Housing in the Last Year: No   Food Insecurity: No Food Insecurity (12/19/2023)    Hunger Vital Sign     Worried About Running Out of Food in the Last Year: Never true     Ran Out of Food in the Last Year: Never true   Transportation Needs: No Transportation Needs (12/19/2023)    PRAPARE - Transportation     Lack of Transportation (Medical): No     Lack of Transportation (Non-Medical): No   Utilities: Not At Risk (12/19/2023)    [facility] Utilities     Threatened with loss of utilities: No       DISCHARGE DETAILS:    Discharge planning discussed with:: Patient  Freedom of Choice: Yes  Comments - Freedom of Choice: Pt prefers to discharge home and follow up with her [facility]    Were Treatment Team discharge recommendations reviewed with patient/caregiver?: Yes  Did patient/caregiver verbalize understanding of patient care needs?: Yes  Were patient/caregiver advised of the risks associated with not following Treatment Team discharge recommendations?: Yes    Contacts  Patient Contacts: Ct [place] (Friend) [phone] (X  Relationship to Patient:: Friend  Contact Method: Phone  Reason/Outcome: Emergency Contact    Requested Home Health Care         Is the patient interested in HHC at discharge?: No  DME Referral Provided  Referral made for DME?: No  Other Referral/Resources/Interventions  Provided:  Referral Comments: Discussed HOST    Treatment Team Recommendation: Home, Substance Abuse Treatment    Additional Comments: CM met w/ Pt to complete assessment and determine if Pt is seeking interventions to address her alcohol rebound. Pt reports she will not be drinking anymore and will be able to manage herself, with the help of her [employer] community, that being [facility] in [place]. Pt decilned HOST. No barriers to discharge, no concerns expressed by Patient. CM will follow through discharge.

## 2023-12-20 NOTE — ASSESSMENT & PLAN NOTE
Patient presents with creatinine of 1.38 from baseline of 0.5-0.7 in setting of decreased p.o. intake  Likely prerenal in etiology  Resolved with IVF  Stable for discharge

## 2023-12-20 NOTE — DISCHARGE SUMMARY
[facility]  Discharge- Katie Faix 1965, 58 y.o. female MRN: [id number]  Unit/Bed#: E5 -01 Encounter: [id number]  Primary Care Provider: Jada Daley PA-C   Date and time admitted to hospital: 12/18/2023  2:32 PM    * Syncope and collapse  Assessment & Plan  Patient found down on her sidewalk by her mailbox by her neighbor, patient with no recollection of the event  Unclear etiology however patient admits to having been binge drinking again last week after 5 years of having stopped alcohol  States she started feeling uneasy and like she was going into withdrawal on Sunday with last drink on Saturday  ED discussed with toxicology as initial impression was withdrawal seizure however this was unlikely as per toxicology given the lack of withdrawal symptoms at this time  CTH with no acute intracranial abnormality  On labs, patient with COLIN, no other apparent metabolic abnormalities with COLIN now resolved  No significant events on telemetry  No significant withdrawal symptoms  Stable for discharge   Counseled on alcohol cessation, recommend PCP f/u within 1 week      COLIN (acute kidney injury) (HCC)  Assessment & Plan  Patient presents with creatinine of 1.38 from baseline of 0.5-0.7 in setting of decreased p.o. intake  Likely prerenal in etiology  Resolved with IVF  Stable for discharge    Alcohol withdrawal syndrome without complication (HCC)  Assessment & Plan  Patient presents following an episode of alcohol binging last week with last drink noted to be on Saturday  Patient currently endorsing some anxiety and mild tremors and feeling of uneasiness  Was placed on CIWA protocol but did not require any PRN benzodiazepine dosing due to low scoring  Received IVF hydration  Thiamine, folate and MV  Stable for discharge    Bipolar disorder (HCC)  Assessment & Plan  Continue prozac and seroquel      Hypothyroidism  Assessment & Plan  Continue levothyroxine      Medical Problems        Resolved Problems  Date Reviewed: 12/20/2023   None       Discharging Physician / Practitioner: Valentina Davey MD  PCP: Jada Daley PA-C  Admission Date:   Admission Orders (From admission, onward)       Ordered        12/18/23 1638  Place in Observation  Once                          Discharge Date: 12/19/23    Consultations During Hospital Stay:  CM    Procedures Performed:   CT head without contrast   Final Result by Sim Sparks MD (12/18 1613)      No acute intracranial abnormality.                  Workstation performed: [id number]         CT spine cervical without contrast   Final Result by Sim Sparks MD (12/18 1616)      No cervical spine fracture or traumatic malalignment.                  Workstation performed: [id number]               Significant Findings / Test Results:   Results from last 7 days   Lab Units 12/19/23  0604   WBC Thousand/uL 6.51   HEMOGLOBIN g/dL 11.3*   HEMATOCRIT % 34.4*   PLATELETS Thousands/uL 162     Results from last 7 days   Lab Units 12/19/23  0604   SODIUM mmol/L 139   POTASSIUM mmol/L 3.6   CHLORIDE mmol/L 106   CO2 mmol/L 28   BUN mg/dL 23   CREATININE mg/dL 0.84   CALCIUM mg/dL 7.5*         Test Results Pending at Discharge (will require follow up):   None     Outpatient Tests Requested:  None    Complications:  None    Reason for Admission: syncope    Hospital Course:   Mary Willoughby is a 58 y.o. female patient who originally presented to the hospital on 12/18/2023 due to syncope. Workup was mostly unrevealing suggestive of possibly alcohol overuse related as well as prerenal/volume depletion with a mild COLIN resolved prior to discharge. The patient was monitored for 24 hours without events. She was discharged home in improved and stable condition with return precautions provided.      Please see above list of diagnoses and related plan for additional information.     Condition at Discharge: stable    Discharge Day Visit / Exam:   Subjective:   "Patient reports feeling improved following dinner and is willing to be discharged home. Remains on room air.    Vitals: Blood Pressure: 89/63 (12/19/23 1535)  Pulse: 71 (12/19/23 1535)  Temperature: 97.8 °F (36.6 °C) (12/19/23 1535)  Temp Source: Oral (12/19/23 0342)  Respirations: 18 (12/19/23 1535)  Height: 5' 1\" (154.9 cm) (12/18/23 1837)  Weight - Scale: 64.4 kg (141 lb 15.6 oz) (12/18/23 1837)  SpO2: 99 % (12/19/23 1535)  Exam:   Physical Exam  Constitutional:       General: She is not in acute distress.  HENT:      Head: Normocephalic and atraumatic.      Nose: No congestion.   Eyes:      Conjunctiva/sclera: Conjunctivae normal.   Cardiovascular:      Rate and Rhythm: Normal rate and regular rhythm.      Heart sounds: No murmur heard.  Pulmonary:      Effort: No respiratory distress.      Breath sounds: No wheezing or rales.   Abdominal:      General: There is no distension.      Tenderness: There is no abdominal tenderness. There is no guarding.   Musculoskeletal:      Right lower leg: No edema.      Left lower leg: No edema.   Skin:     General: Skin is warm and dry.   Neurological:      Mental Status: She is oriented to person, place, and time.          Discharge instructions/Information to patient and family:   See after visit summary for information provided to patient and family.      Provisions for Follow-Up Care:  See after visit summary for information related to follow-up care and any pertinent home health orders.      Mobility at time of Discharge:   Basic Mobility Inpatient Raw Score: 22  JH-HLM Goal: 7: Walk 25 feet or more  JH-HLM Achieved: 7: Walk 25 feet or more  HLM Goal achieved. Continue to encourage appropriate mobility.     Disposition:   Home    Planned Readmission: No     Discharge Statement:  I spent 35 minutes discharging the patient. This time was spent on the day of discharge. I had direct contact with the patient on the day of discharge. Greater than 50% of the total time was spent " examining patient, answering all patient questions, arranging and discussing plan of care with patient as well as directly providing post-discharge instructions.  Additional time then spent on discharge activities.    Discharge Medications:  See after visit summary for reconciled discharge medications provided to patient and/or family.      **Please Note: This note may have been constructed using a voice recognition system**

## 2023-12-20 NOTE — ASSESSMENT & PLAN NOTE
Patient presents following an episode of alcohol binging last week with last drink noted to be on Saturday  Patient currently endorsing some anxiety and mild tremors and feeling of uneasiness  Was placed on CIWA protocol but did not require any PRN benzodiazepine dosing due to low scoring  Received IVF hydration  Thiamine, folate and MV  Stable for discharge

## 2023-12-20 NOTE — ASSESSMENT & PLAN NOTE
Patient found down on her sidewalk by her mailbox by her neighbor, patient with no recollection of the event  Unclear etiology however patient admits to having been binge drinking again last week after 5 years of having stopped alcohol  States she started feeling uneasy and like she was going into withdrawal on Sunday with last drink on Saturday  ED discussed with toxicology as initial impression was withdrawal seizure however this was unlikely as per toxicology given the lack of withdrawal symptoms at this time  CTH with no acute intracranial abnormality  On labs, patient with COLIN, no other apparent metabolic abnormalities with COLIN now resolved  No significant events on telemetry  No significant withdrawal symptoms  Stable for discharge   Counseled on alcohol cessation, recommend PCP f/u within 1 week

## 2023-12-21 ENCOUNTER — TRANSITIONAL CARE MANAGEMENT (OUTPATIENT)
Dept: FAMILY MEDICINE CLINIC | Facility: CLINIC | Age: 58
End: 2023-12-21

## 2023-12-26 PROBLEM — Z00.00 MEDICARE ANNUAL WELLNESS VISIT, SUBSEQUENT: Status: ACTIVE | Noted: 2020-05-28

## 2024-02-08 ENCOUNTER — TELEPHONE (OUTPATIENT)
Dept: PSYCHIATRY | Facility: CLINIC | Age: 59
End: 2024-02-08

## 2024-02-08 NOTE — TELEPHONE ENCOUNTER
The patient contacted the office, seeking med mgmt services. The writer informed the patient that he would relay a message to the intake coordinators for a chart review. The intake coordinators will be in contact at their earliest convenience to discuss the next steps in the scheduling process.

## 2024-02-08 NOTE — TELEPHONE ENCOUNTER
Contacted pt in regards to previous writer's message. While during intake, Pt did admit to relapsing on alcohol in December. Writer made pt aware they will be referred to [facility] program to handle case moving forward.

## 2024-02-09 ENCOUNTER — TELEPHONE (OUTPATIENT)
Dept: PSYCHIATRY | Facility: CLINIC | Age: 59
End: 2024-02-09

## 2024-02-21 PROBLEM — Z00.00 MEDICARE ANNUAL WELLNESS VISIT, SUBSEQUENT: Status: RESOLVED | Noted: 2020-05-28 | Resolved: 2024-02-21

## (undated) DEVICE — GLOVE SRG BIOGEL 7

## (undated) DEVICE — CHLORAPREP HI-LITE 26ML ORANGE

## (undated) DEVICE — STERILE BETHLEHEM PLASTIC HAND: Brand: CARDINAL HEALTH

## (undated) DEVICE — CYSTO TUBING SINGLE IRRIGATION

## (undated) DEVICE — GAUZE SPONGES,16 PLY: Brand: CURITY

## (undated) DEVICE — TUBING SUCTION 5MM X 12 FT

## (undated) DEVICE — MINI BLADE ROUND TIP SHARP ON ONE SIDE

## (undated) DEVICE — STRETCH BANDAGE: Brand: CURITY

## (undated) DEVICE — FINGER STRIPS: Brand: DEROYAL

## (undated) DEVICE — PENROSE DRAIN, 18 X 3 8: Brand: CARDINAL HEALTH

## (undated) DEVICE — DRAPE SHEET THREE QUARTER

## (undated) DEVICE — GLOVE INDICATOR PI UNDERGLOVE SZ 7 BLUE

## (undated) DEVICE — CULTURE TUBE AEROBIC

## (undated) DEVICE — OCCLUSIVE GAUZE STRIP,3% BISMUTH TRIBROMOPHENATE IN PETROLATUM BLEND: Brand: XEROFORM

## (undated) DEVICE — INTENDED FOR TISSUE SEPARATION, AND OTHER PROCEDURES THAT REQUIRE A SHARP SURGICAL BLADE TO PUNCTURE OR CUT.: Brand: BARD-PARKER ® CARBON RIB-BACK BLADES

## (undated) DEVICE — CULTURE TUBE ANAEROBIC